# Patient Record
Sex: MALE | Race: WHITE | URBAN - METROPOLITAN AREA
[De-identification: names, ages, dates, MRNs, and addresses within clinical notes are randomized per-mention and may not be internally consistent; named-entity substitution may affect disease eponyms.]

---

## 2017-11-07 PROBLEM — M16.11 OSTEOARTHRITIS OF ONE HIP, RIGHT: Chronic | Status: ACTIVE | Noted: 2017-11-07

## 2018-06-08 ENCOUNTER — OFFICE VISIT (OUTPATIENT)
Dept: CARDIOLOGY CLINIC | Age: 83
End: 2018-06-08
Payer: MEDICARE

## 2018-06-08 VITALS
HEART RATE: 64 BPM | SYSTOLIC BLOOD PRESSURE: 114 MMHG | HEIGHT: 64 IN | DIASTOLIC BLOOD PRESSURE: 64 MMHG | BODY MASS INDEX: 26.67 KG/M2 | RESPIRATION RATE: 12 BRPM | WEIGHT: 156.2 LBS

## 2018-06-08 DIAGNOSIS — E66.3 OVER WEIGHT: ICD-10-CM

## 2018-06-08 DIAGNOSIS — I25.10 CORONARY ARTERY DISEASE INVOLVING NATIVE CORONARY ARTERY OF NATIVE HEART WITHOUT ANGINA PECTORIS: Primary | ICD-10-CM

## 2018-06-08 DIAGNOSIS — I38 VALVULAR HEART DISEASE: ICD-10-CM

## 2018-06-08 DIAGNOSIS — E78.5 DYSLIPIDEMIA: ICD-10-CM

## 2018-06-08 DIAGNOSIS — I27.20 PULMONARY HYPERTENSION (HCC): ICD-10-CM

## 2018-06-08 PROCEDURE — G8599 NO ASA/ANTIPLAT THER USE RNG: HCPCS | Performed by: INTERNAL MEDICINE

## 2018-06-08 PROCEDURE — G8427 DOCREV CUR MEDS BY ELIG CLIN: HCPCS | Performed by: INTERNAL MEDICINE

## 2018-06-08 PROCEDURE — 93000 ELECTROCARDIOGRAM COMPLETE: CPT | Performed by: INTERNAL MEDICINE

## 2018-06-08 PROCEDURE — G8419 CALC BMI OUT NRM PARAM NOF/U: HCPCS | Performed by: INTERNAL MEDICINE

## 2018-06-08 PROCEDURE — 99214 OFFICE O/P EST MOD 30 MIN: CPT | Performed by: INTERNAL MEDICINE

## 2018-06-08 PROCEDURE — 1036F TOBACCO NON-USER: CPT | Performed by: INTERNAL MEDICINE

## 2018-06-08 PROCEDURE — 1123F ACP DISCUSS/DSCN MKR DOCD: CPT | Performed by: INTERNAL MEDICINE

## 2018-06-08 PROCEDURE — 1101F PT FALLS ASSESS-DOCD LE1/YR: CPT | Performed by: INTERNAL MEDICINE

## 2018-06-08 PROCEDURE — 4040F PNEUMOC VAC/ADMIN/RCVD: CPT | Performed by: INTERNAL MEDICINE

## 2018-06-08 NOTE — PROGRESS NOTES
medications. Compliance with medications and f/u with all physicians discussed. Risk factor modification based on risk profile discussed. Call if any exertional chest pain, short of breath, dizzy or palpitations   Follow up in 12  months or earlier if needed.          [facility] Card[facility]  [address] [facility], [address] [place] [address]  [phone]

## 2019-10-02 ENCOUNTER — INITIAL CONSULT (OUTPATIENT)
Dept: CARDIOLOGY CLINIC | Age: 84
End: 2019-10-02
Payer: MEDICARE

## 2019-10-02 VITALS
WEIGHT: 145.8 LBS | HEART RATE: 67 BPM | RESPIRATION RATE: 18 BRPM | HEIGHT: 64 IN | SYSTOLIC BLOOD PRESSURE: 130 MMHG | DIASTOLIC BLOOD PRESSURE: 70 MMHG | BODY MASS INDEX: 24.89 KG/M2

## 2019-10-02 DIAGNOSIS — I38 VHD (VALVULAR HEART DISEASE): ICD-10-CM

## 2019-10-02 DIAGNOSIS — I25.119 CORONARY ARTERY DISEASE WITH ANGINA PECTORIS, UNSPECIFIED VESSEL OR LESION TYPE, UNSPECIFIED WHETHER NATIVE OR TRANSPLANTED HEART (HCC): Primary | ICD-10-CM

## 2019-10-02 PROCEDURE — G8484 FLU IMMUNIZE NO ADMIN: HCPCS | Performed by: NURSE PRACTITIONER

## 2019-10-02 PROCEDURE — 93000 ELECTROCARDIOGRAM COMPLETE: CPT | Performed by: INTERNAL MEDICINE

## 2019-10-02 PROCEDURE — 1123F ACP DISCUSS/DSCN MKR DOCD: CPT | Performed by: NURSE PRACTITIONER

## 2019-10-02 PROCEDURE — G8419 CALC BMI OUT NRM PARAM NOF/U: HCPCS | Performed by: NURSE PRACTITIONER

## 2019-10-02 PROCEDURE — G8427 DOCREV CUR MEDS BY ELIG CLIN: HCPCS | Performed by: NURSE PRACTITIONER

## 2019-10-02 PROCEDURE — G8599 NO ASA/ANTIPLAT THER USE RNG: HCPCS | Performed by: NURSE PRACTITIONER

## 2019-10-02 PROCEDURE — 99213 OFFICE O/P EST LOW 20 MIN: CPT | Performed by: NURSE PRACTITIONER

## 2019-10-02 PROCEDURE — 1036F TOBACCO NON-USER: CPT | Performed by: NURSE PRACTITIONER

## 2019-10-02 PROCEDURE — 4040F PNEUMOC VAC/ADMIN/RCVD: CPT | Performed by: NURSE PRACTITIONER

## 2021-05-28 ENCOUNTER — TELEPHONE (OUTPATIENT)
Dept: PHARMACY | Facility: CLINIC | Age: 86
End: 2021-05-28

## 2021-05-28 NOTE — TELEPHONE ENCOUNTER
[facility] HEALTH CLINICAL PHARMACY REVIEW: ADHERENCE REVIEW  Identified care gap per [place]; fills at [facility]: ACE/ARB and Statin adherence    Last Visit: n/a    ASSESSMENT  ACE/ARB ADHERENCE    Per Insurance Records through [facility]:  LOSARTAN POT TAB 25MG last filled on 5/11/21 for 60 day supply. Next refill due: 7/10/21    Per [facility]:   LOSARTAN POT TAB 25MG last picked up on 5/11/21 for 60 day supply. 1 refills remaining. Billed through [employer]     BP Readings from Last 3 Encounters:   10/02/19 130/70   06/08/18 114/64   11/09/17 106/62     CrCl cannot be calculated (Patient's most recent lab result is older than the maximum 120 days allowed. ). [facility]    Per Insurance Records through [facility]:   Fail Date: 6/14/21  ROSUVASTATIN TAB 20MG last filled on 1/3/21 for 90 day supply. Next refill due: 4/3/21      Per [facility]:   ROSUVASTATIN TAB 20MG last picked up on 1/3/21 for 90 day supply. 2 refills remaining. Billed through [email or web address]. Awaiting patient pickup. No results found for: CHOL, TRIG, HDL, LDLCHOLESTEROL, LDLCALC, LDLDIRECT  No results found for: ALT, AST  The ASCVD Risk score ([address] et al., 2013) failed to calculate for the following reasons: The 2013 ASCVD risk score is only valid for ages 36 to 78     PLAN  The following are interventions that have been identified:   - Patient overdue refilling Rosuvastatin and active on home medication list.   - Patient needs refills for Losartan    Reached patient to review. Patient stated he was out of Rosuvastatin and stated he would pick it up tomorrow at pharmacy. Patient seemed a little confused at times on what call was about. Advised that using a pill box or alarm could help him stay adherent with medication. No future appointments.     [place] Holbrook[address]   Direct: [phone]  Department, toll free: [phone], option 7

## 2021-05-28 NOTE — TELEPHONE ENCOUNTER
For Pharmacy [address] in place:  No   Recommendation Provided To: Patient/Caregiver: 1 via Telephone   Intervention Detail: Adherence Monitorin   Gap Closed?: Yes    Total # of Interventions Recommended: 1   Total # of Interventions Accepted: 1   Intervention Accepted By: Patient/Caregiver: 1   Time Spent (min): 15

## 2021-09-07 ENCOUNTER — TELEPHONE (OUTPATIENT)
Dept: PHARMACY | Facility: CLINIC | Age: 86
End: 2021-09-07

## 2021-09-07 NOTE — LETTER
[place]  [address], [address]        [address] [facility] [address] [facility] [address]           09/09/21     Dear Radha Henderson,    We tried to reach you recently regarding your blood pressure and statin medications, but were unable to reach you on the telephone. We have on file that you are currently taking losartan and rosuvastatin. If you are no longer taking or taking differently, please call us at the number below so that we can discuss this and update your medication profile. It appears that this medication has not been filled at proper times. We are worried you might be missing doses or not taking it as directed. It is important that you take your medications regularly and try not to miss a single dose. Some ways to help you remember to take and refill your medications are to:  · Use a pill box, set an alarm, and/or keep your medication near something that you do every day  · Ask your pharmacy if they participate in [facility]", a program where you can  all of your medications on the same day  · Ask your pharmacy if you can be set up with automatic refill, where they will automatically refill your prescription when it is due and let you know it's ready to     Sincerely,   Bebeto Allison, Pharm. [address] [facility], toll free: [phone], option 1

## 2021-09-07 NOTE — TELEPHONE ENCOUNTER
POPULATION HEALTH CLINICAL PHARMACY REVIEW: ADHERENCE REVIEW  Identified care gap per [place]; fills at [facility]: ACE/ARB and Statin adherence    Last Visit: unknown; [facility] PCP    ASSESSMENT  ACE/ARB ADHERENCE    Per Insurance Records through 8/23/2021 ([facility] = 77%; Potential Fail Date: 9/12/2021):   Losartan 25mg last filled on 5/11/2021 for 60 day supply. Next refill due: 7/10/2021    Per [facility]:   Losartan 25mg last picked up on 8/24/2021 for 90 day supply. 0 refills remaining. Billed through [employer]     BP Readings from Last 3 Encounters:   10/02/19 130/70   06/08/18 114/64   11/09/17 106/62     CrCl cannot be calculated (Patient's most recent lab result is older than the maximum 120 days allowed. ). STATIN ADHERENCE    Per Insurance Records through 8/23/2021 (YTLESTER Baker = 76%; Potential Fail Date: 9/12/2021):   Rosuvastatin 20mg last filled on 5/28/2021 for 90 day supply. Next refill due: 8/26/2021    Per [facility]:   Rosuvastatin 20mg last picked up on 5/28/2021 for 90 day supply. 1 refills remaining. Billed through [employer]     No results found for: CHOL, TRIG, HDL, LDLCHOLESTEROL, LDLCALC, LDLDIRECT  No results found for: ALT, AST  The ASCVD Risk score (Jaymie Later., et al., 2013) failed to calculate for the following reasons: The 2013 ASCVD risk score is only valid for ages 36 to 78     PLAN  The following are interventions that have been identified:   - Patient overdue refilling rosuvastatin and losartan and active on home medication list.      Will leave message with office of Elin Cabezas CNP who prescribes losartan for refill. Per Ana PCP is [address]. Attempting to reach patient to review.  Left message asking for return call.     [address] // Department, toll free [phone], Option 1

## 2021-09-09 NOTE — TELEPHONE ENCOUNTER
POPULATION HEALTH CLINICAL PHARMACY REVIEW: ADHERENCE REVIEW    Second attempt to reach patient. Left message asking for return call. Will send letter. Ting Ortiz Pharm. [address] [facility], toll free: [phone], option 1     ==============================================================    For Pharmacy [id number] [place] [address] Road in place:  No   Recommendation Provided To: Provider: 1 via Called provider office   Intervention Detail: Adherence Monitorin and Refill(s) Provided   Gap Closed?: No    Intervention Accepted By: Provider: 0   Time Spent (min): 45

## 2022-06-10 ENCOUNTER — HOSPITAL ENCOUNTER (EMERGENCY)
Age: 87
Discharge: HOME | End: 2022-06-10
Attending: EMERGENCY MEDICINE
Payer: MEDICARE

## 2022-06-10 ENCOUNTER — APPOINTMENT (OUTPATIENT)
Dept: GENERAL RADIOLOGY | Age: 87
End: 2022-06-10
Payer: MEDICARE

## 2022-06-10 VITALS
RESPIRATION RATE: 21 BRPM | SYSTOLIC BLOOD PRESSURE: 122 MMHG | HEART RATE: 55 BPM | DIASTOLIC BLOOD PRESSURE: 80 MMHG | TEMPERATURE: 98 F | HEIGHT: 64 IN | WEIGHT: 145 LBS | OXYGEN SATURATION: 97 % | BODY MASS INDEX: 24.75 KG/M2

## 2022-06-10 DIAGNOSIS — I49.3 ASYMPTOMATIC PVCS: Primary | ICD-10-CM

## 2022-06-10 LAB
ALBUMIN SERPL-MCNC: 3.8 G/DL (ref 3.5–5.2)
ALP BLD-CCNC: 67 U/L (ref 40–129)
ALT SERPL-CCNC: 24 U/L (ref 0–40)
ANION GAP SERPL CALCULATED.3IONS-SCNC: 10 MMOL/L (ref 7–16)
AST SERPL-CCNC: 57 U/L (ref 0–39)
BASOPHILS ABSOLUTE: 0.05 E9/L (ref 0–0.2)
BASOPHILS RELATIVE PERCENT: 0.6 % (ref 0–2)
BILIRUB SERPL-MCNC: 0.3 MG/DL (ref 0–1.2)
BUN BLDV-MCNC: 14 MG/DL (ref 6–23)
CALCIUM SERPL-MCNC: 8.4 MG/DL (ref 8.6–10.2)
CHLORIDE BLD-SCNC: 105 MMOL/L (ref 98–107)
CO2: 23 MMOL/L (ref 22–29)
CREAT SERPL-MCNC: 0.9 MG/DL (ref 0.7–1.2)
EOSINOPHILS ABSOLUTE: 0.22 E9/L (ref 0.05–0.5)
EOSINOPHILS RELATIVE PERCENT: 2.8 % (ref 0–6)
GFR AFRICAN AMERICAN: >60
GFR NON-AFRICAN AMERICAN: >60 ML/MIN/1.73
GLUCOSE BLD-MCNC: 105 MG/DL (ref 74–99)
HCT VFR BLD CALC: 42.7 % (ref 37–54)
HEMOGLOBIN: 14.1 G/DL (ref 12.5–16.5)
IMMATURE GRANULOCYTES #: 0.01 E9/L
IMMATURE GRANULOCYTES %: 0.1 % (ref 0–5)
LYMPHOCYTES ABSOLUTE: 0.97 E9/L (ref 1.5–4)
LYMPHOCYTES RELATIVE PERCENT: 12.3 % (ref 20–42)
MAGNESIUM: 2.3 MG/DL (ref 1.6–2.6)
MCH RBC QN AUTO: 31.8 PG (ref 26–35)
MCHC RBC AUTO-ENTMCNC: 33 % (ref 32–34.5)
MCV RBC AUTO: 96.2 FL (ref 80–99.9)
MONOCYTES ABSOLUTE: 0.62 E9/L (ref 0.1–0.95)
MONOCYTES RELATIVE PERCENT: 7.9 % (ref 2–12)
NEUTROPHILS ABSOLUTE: 6 E9/L (ref 1.8–7.3)
NEUTROPHILS RELATIVE PERCENT: 76.3 % (ref 43–80)
PDW BLD-RTO: 14.2 FL (ref 11.5–15)
PLATELET # BLD: 182 E9/L (ref 130–450)
PMV BLD AUTO: 9.7 FL (ref 7–12)
POTASSIUM SERPL-SCNC: 5.3 MMOL/L (ref 3.5–5)
RBC # BLD: 4.44 E12/L (ref 3.8–5.8)
SODIUM BLD-SCNC: 138 MMOL/L (ref 132–146)
TOTAL PROTEIN: 6.1 G/DL (ref 6.4–8.3)
TROPONIN, HIGH SENSITIVITY: 14 NG/L (ref 0–11)
TROPONIN, HIGH SENSITIVITY: 14 NG/L (ref 0–11)
WBC # BLD: 7.9 E9/L (ref 4.5–11.5)

## 2022-06-10 PROCEDURE — 85025 COMPLETE CBC W/AUTO DIFF WBC: CPT

## 2022-06-10 PROCEDURE — 99285 EMERGENCY DEPT VISIT HI MDM: CPT

## 2022-06-10 PROCEDURE — 83735 ASSAY OF MAGNESIUM: CPT

## 2022-06-10 PROCEDURE — 80053 COMPREHEN METABOLIC PANEL: CPT

## 2022-06-10 PROCEDURE — 71045 X-RAY EXAM CHEST 1 VIEW: CPT

## 2022-06-10 PROCEDURE — 84484 ASSAY OF TROPONIN QUANT: CPT

## 2022-06-10 PROCEDURE — 93005 ELECTROCARDIOGRAM TRACING: CPT

## 2022-06-10 ASSESSMENT — ENCOUNTER SYMPTOMS
ABDOMINAL PAIN: 0
CHEST TIGHTNESS: 0
DIARRHEA: 0
CHOKING: 0
BLOOD IN STOOL: 0
SHORTNESS OF BREATH: 0
VOMITING: 0
COLOR CHANGE: 0
SORE THROAT: 0
CONSTIPATION: 0
NAUSEA: 0
COUGH: 0

## 2022-06-10 ASSESSMENT — PAIN - FUNCTIONAL ASSESSMENT: PAIN_FUNCTIONAL_ASSESSMENT: NONE - DENIES PAIN

## 2022-06-10 NOTE — ED PROVIDER NOTES
[facility] ENCOUNTER      Pt Name: Paco Carnes  MRN: [id number]  [place] 3/20/1930  Date of evaluation: 6/10/2022      CHIEF COMPLAINT       Chief Complaint   Patient presents with    Irregular Heart Beat     sent in by Dr. Silvia Santillan for frequent PVC's which is new for patient. Patient has no complaints        HPI  Paco Carnes is a 80 y.o. male  with PMHx of HTN (metoprolol, losartan), HLD presents with weakness for the past 2 days. Patient went to urgent care, they sent patient here for frequent PVCs. Spoke to nephews (power of ) who states patient is at his baseline. Describes symptoms moderate in severity with no alleviating or exacerbating factors. Denies any fever, chills, n/v, headache, dizziness, vision changes, neck tenderness or stiffness,  cp, palpitations, leg swelling/tenderness, sob, cough, abd pain, dysuria, hematuria, diarrhea, constipation. Except as noted above the remainder of the review of systems was reviewed and negative. Review of Systems   Constitutional: Negative for appetite change, chills, fatigue and fever. HENT: Negative for congestion and sore throat. Eyes: Negative for visual disturbance. Respiratory: Negative for cough, choking, chest tightness and shortness of breath. Cardiovascular: Negative for chest pain, palpitations and leg swelling. Gastrointestinal: Negative for abdominal pain, blood in stool, constipation, diarrhea, nausea and vomiting. Endocrine: Negative for polyphagia. Genitourinary: Negative for decreased urine volume, difficulty urinating, flank pain and hematuria. Musculoskeletal: Negative for arthralgias, gait problem, joint swelling and myalgias. Skin: Negative for color change, pallor, rash and wound. Neurological: Positive for weakness. Negative for dizziness, tremors, seizures, syncope, light-headedness, numbness and headaches. Hematological: Negative for adenopathy. Does not bruise/bleed easily. Psychiatric/Behavioral: Negative for confusion and hallucinations. All other systems reviewed and are negative. Physical Exam  Vitals reviewed. Constitutional:       General: He is not in acute distress. Appearance: Normal appearance. He is normal weight. He is not ill-appearing, toxic-appearing or diaphoretic. HENT:      Head: Normocephalic and atraumatic. Right Ear: External ear normal.      Left Ear: External ear normal.      Nose: Nose normal. No congestion or rhinorrhea. Mouth/Throat:      Mouth: Mucous membranes are moist.      Pharynx: Oropharynx is clear. No oropharyngeal exudate or posterior oropharyngeal erythema. Eyes:      Extraocular Movements: Extraocular movements intact. Conjunctiva/sclera: Conjunctivae normal.      Pupils: Pupils are equal, round, and reactive to light. Cardiovascular:      Rate and Rhythm: Normal rate and regular rhythm. Pulses: Normal pulses. Pulmonary:      Effort: Pulmonary effort is normal. No respiratory distress. Breath sounds: Normal breath sounds. No wheezing or rhonchi. Chest:      Chest wall: No tenderness. Abdominal:      General: Abdomen is flat. Bowel sounds are normal. There is no distension. Palpations: Abdomen is soft. Tenderness: There is no abdominal tenderness. There is no right CVA tenderness, left CVA tenderness or guarding. Hernia: No hernia is present. Musculoskeletal:      Cervical back: Normal range of motion. Right lower leg: No edema. Left lower leg: No edema. Skin:     General: Skin is warm and dry. Capillary Refill: Capillary refill takes less than 2 seconds. Neurological:      General: No focal deficit present. Mental Status: He is alert and oriented to person, place, and time. Mental status is at baseline.    Psychiatric:         Mood and Affect: Mood normal.         Behavior: Behavior normal.         Thought Content: Thought content normal.         Judgment: Judgment normal.          Procedures     MDM        80 y.o. male  with PMHx of HTN (metoprolol, losartan), HLD presents with weakness for the past 2 days. Patient went to urgent care, they sent patient here for frequent PVCs. Spoke to nephews (power of ) who states patient is at his baseline. While in the ED patient was hemodynamically stable, afebrile, nontoxic-appearing, in no respiratory distress. Physical exam unremarkable. Labs remarkable for elevated troponin with delta troponin of 0, normal electrolytes, low H&H with normal magnesium. EKG normal sinus with occasional PVCs, rBBB with  no sign of acute ischemia. CXR negative for any acute pathologies. Patient remained asymptomatic throughout his stay. Patient feels comfortable going home and will follow up outpatient with PCP. Patient understands to return to the ED in case of worsening symptoms. ED Course as of 06/11/22 0212   Sat Jun 11, 2022   0209 EKG: This EKG is signed by emergency department physician. Rate: 61  Rhythm: Sinus, few PVCs, and with Right BBB  Interpretation: non-specific EKG  Comparison: stable as compared to patient's most recent EKG      [TC]      ED Course User Adrien Yee MD       --------------------------------------------- PAST HISTORY ---------------------------------------------  Past Medical History:  has a past medical history of Asthma, BPH (benign prostatic hypertrophy), CAD (coronary artery disease), COPD (chronic obstructive pulmonary disease) ([facility] [address] Glaucoma, Hyperlipidemia, Hypertension, Preoperative clearance, Retention of urine, and Valvular heart disease. Past Surgical History:  has a past surgical history that includes tumor excision (4 YRS AGO); Colonoscopy (11/7/2011); skin biopsy; Tonsillectomy; Diagnostic Cardiac Cath Lab Procedure (10/02/02); Coronary angioplasty (10/02/02);  Cataract removal (11/10 & 1/11); eye surgery; and Hip Arthroplasty (Right, 11/07/2017). Social History:  reports that he has quit smoking. His smoking use included cigarettes. He has a 30.00 pack-year smoking history. He has never used smokeless tobacco. He reports current alcohol use. He reports that he does not use drugs. Family History: family history includes Cancer in his brother and sister; Hypertension in his sister. The patients home medications have been reviewed. Allergies: Patient has no known allergies.     -------------------------------------------------- RESULTS -------------------------------------------------  Labs:  Results for orders placed or performed during the hospital encounter of 06/10/22   CBC with Auto Differential   Result Value Ref Range    WBC 7.9 4.5 - 11.5 E9/L    RBC 4.44 3.80 - 5.80 E12/L    Hemoglobin 14.1 12.5 - 16.5 g/dL    Hematocrit 42.7 37.0 - 54.0 %    MCV 96.2 80.0 - 99.9 fL    MCH 31.8 26.0 - 35.0 pg    MCHC 33.0 32.0 - 34.5 %    RDW 14.2 11.5 - 15.0 fL    Platelets 243 306 - 751 E9/L    MPV 9.7 7.0 - 12.0 fL    Neutrophils % 76.3 43.0 - 80.0 %    Immature Granulocytes % 0.1 0.0 - 5.0 %    Lymphocytes % 12.3 (L) 20.0 - 42.0 %    Monocytes % 7.9 2.0 - 12.0 %    Eosinophils % 2.8 0.0 - 6.0 %    Basophils % 0.6 0.0 - 2.0 %    Neutrophils Absolute 6.00 1.80 - 7.30 E9/L    Immature Granulocytes # 0.01 E9/L    Lymphocytes Absolute 0.97 (L) 1.50 - 4.00 E9/L    Monocytes Absolute 0.62 0.10 - 0.95 E9/L    Eosinophils Absolute 0.22 0.05 - 0.50 E9/L    Basophils Absolute 0.05 0.00 - 0.20 E9/L   Troponin   Result Value Ref Range    Troponin, High Sensitivity 14 (H) 0 - 11 ng/L   Comprehensive Metabolic Panel   Result Value Ref Range    Sodium 138 132 - 146 mmol/L    Chloride 105 98 - 107 mmol/L    CO2 23 22 - 29 mmol/L    Anion Gap 10 7 - 16 mmol/L    Glucose 105 (H) 74 - 99 mg/dL    BUN 14 6 - 23 mg/dL    CREATININE 0.9 0.7 - 1.2 mg/dL    GFR Non-African American >60 >=60 mL/min/1.73    GFR African American >60     Calcium 8.4 (L) 8.6 - 10.2 mg/dL    Total Protein 6.1 (L) 6.4 - 8.3 g/dL    Albumin 3.8 3.5 - 5.2 g/dL    Total Bilirubin 0.3 0.0 - 1.2 mg/dL    Alkaline Phosphatase 67 40 - 129 U/L    ALT 24 0 - 40 U/L    AST 57 (H) 0 - 39 U/L    Potassium 5.3 (H) 3.5 - 5.0 mmol/L   Magnesium   Result Value Ref Range    Magnesium 2.3 1.6 - 2.6 mg/dL   Troponin   Result Value Ref Range    Troponin, High Sensitivity 14 (H) 0 - 11 ng/L       Radiology:  XR CHEST PORTABLE   Final Result   No acute cardiopulmonary abnormality.             ------------------------- NURSING NOTES AND VITALS REVIEWED ---------------------------  Date / Time Roomed:  6/10/2022  4:32 PM  ED Bed Assignment:  ROBERT/ROBERT    The nursing notes within the ED encounter and vital signs as below have been reviewed. /80   Pulse 55   Temp 98 °F (36.7 °C)   Resp 21   Ht 5' 4\" (1.626 m)   Wt 145 lb (65.8 kg)   SpO2 97%   BMI 24.89 kg/m²   Oxygen Saturation Interpretation: Normal      ------------------------------------------ PROGRESS NOTES ------------------------------------------  2:11 AM EDT  I have spoken with the patient and discussed todays results, in addition to providing specific details for the plan of care and counseling regarding the diagnosis and prognosis. Their questions are answered at this time and they are agreeable with the plan. I discussed at length with them reasons for immediate return here for re evaluation. They will followup with their primary care physician by calling their office tomorrow. --------------------------------- ADDITIONAL PROVIDER NOTES ---------------------------------  At this time the patient is without objective evidence of an acute process requiring hospitalization or inpatient management. They have remained hemodynamically stable throughout their entire ED visit and are stable for discharge with outpatient follow-up.      The plan has been discussed in detail and they are aware of the specific conditions for emergent return, as well as the importance of follow-up. Discharge Medication List as of 6/10/2022  6:24 PM          Diagnosis:  1. Asymptomatic PVCs        Disposition:  Patient's disposition: Discharge to home  Patient's condition is stable.        Luisa Varela MD  Resident  06/11/22 0227

## 2022-06-12 LAB
EKG ATRIAL RATE: 61 BPM
EKG P AXIS: 51 DEGREES
EKG P-R INTERVAL: 176 MS
EKG Q-T INTERVAL: 472 MS
EKG QRS DURATION: 144 MS
EKG QTC CALCULATION (BAZETT): 475 MS
EKG R AXIS: 20 DEGREES
EKG T AXIS: -16 DEGREES
EKG VENTRICULAR RATE: 61 BPM

## 2022-11-29 ENCOUNTER — TELEPHONE (OUTPATIENT)
Dept: ADMINISTRATIVE | Age: 87
End: 2022-11-29

## 2022-11-29 NOTE — TELEPHONE ENCOUNTER
Celina with Dr. Evelina Weston' office calling to schedule URGENT NP - prior Dr. Doyle Fair pt - Oct 2019. Office notified. Theodore Guajardo[facility]. Office added on tomorrow with [address].

## 2022-11-30 ENCOUNTER — OFFICE VISIT (OUTPATIENT)
Dept: CARDIOLOGY CLINIC | Age: 87
End: 2022-11-30
Payer: MEDICARE

## 2022-11-30 VITALS
HEART RATE: 76 BPM | RESPIRATION RATE: 18 BRPM | HEIGHT: 64 IN | DIASTOLIC BLOOD PRESSURE: 84 MMHG | SYSTOLIC BLOOD PRESSURE: 136 MMHG | WEIGHT: 141.4 LBS | BODY MASS INDEX: 24.14 KG/M2 | OXYGEN SATURATION: 94 %

## 2022-11-30 DIAGNOSIS — I38 VALVULAR HEART DISEASE: Primary | ICD-10-CM

## 2022-11-30 DIAGNOSIS — I25.10 CORONARY ARTERY DISEASE WITHOUT ANGINA PECTORIS, UNSPECIFIED VESSEL OR LESION TYPE, UNSPECIFIED WHETHER NATIVE OR TRANSPLANTED HEART: ICD-10-CM

## 2022-11-30 PROBLEM — M16.11 OSTEOARTHRITIS OF ONE HIP, RIGHT: Chronic | Status: RESOLVED | Noted: 2017-11-07 | Resolved: 2022-11-30

## 2022-11-30 PROCEDURE — G8484 FLU IMMUNIZE NO ADMIN: HCPCS | Performed by: INTERNAL MEDICINE

## 2022-11-30 PROCEDURE — 99205 OFFICE O/P NEW HI 60 MIN: CPT | Performed by: INTERNAL MEDICINE

## 2022-11-30 PROCEDURE — G8427 DOCREV CUR MEDS BY ELIG CLIN: HCPCS | Performed by: INTERNAL MEDICINE

## 2022-11-30 PROCEDURE — 1123F ACP DISCUSS/DSCN MKR DOCD: CPT | Performed by: INTERNAL MEDICINE

## 2022-11-30 PROCEDURE — 1036F TOBACCO NON-USER: CPT | Performed by: INTERNAL MEDICINE

## 2022-11-30 PROCEDURE — G8420 CALC BMI NORM PARAMETERS: HCPCS | Performed by: INTERNAL MEDICINE

## 2022-11-30 PROCEDURE — 93000 ELECTROCARDIOGRAM COMPLETE: CPT | Performed by: INTERNAL MEDICINE

## 2022-11-30 NOTE — PROGRESS NOTES
Chief Complaint   Patient presents with    Coronary Artery Disease    Valvular Heart Disease       Patient Active Problem List    Diagnosis Date Noted    COPD (chronic obstructive pulmonary disease) ([facility] [address]     Benign prostatic hyperplasia      Overview Note:     Updating Deprecated Diagnoses      Hyperlipidemia     Hypertension     Glaucoma     Valvular heart disease     CAD (coronary artery disease) 01/09/2013     Overview Note:     A. \"s/p 2 stents\":  2.75 x12, 2.75 x 8 [facility] -  OM2 on 10/2/2002 - Dr Ruth Marquez;         Current Outpatient Medications   Medication Sig Dispense Refill    acetaminophen (TYLENOL) 325 MG tablet Take 2 tablets by mouth every 6 hours as needed for Pain 120 tablet 0    aspirin 81 MG EC tablet Take 1 tablet by mouth 2 times daily DVT prophylaxis following hip replacement x 30 days (Patient taking differently: Take 81 mg by mouth daily DVT prophylaxis following hip replacement x 30 days) 60 tablet 0    Multiple Vitamins-Minerals (OCUVITE) TABS oral tablet Take 1 tablet by mouth daily      tamsulosin (FLOMAX) 0.4 MG capsule Take 0.4 mg by mouth daily. Multiple Vitamins-Minerals (CENTRUM SILVER ADULT 50+) TABS Take 1 tablet by mouth daily. metoprolol (TOPROL-XL) 25 MG XL tablet Take 12.5 mg by mouth nightly      finasteride (PROSCAR) 5 MG tablet Take 5 mg by mouth daily.          Current Facility-Administered Medications   Medication Dose Route Frequency Provider Last Rate Last Admin    perflutren lipid microspheres (DEFINITY) injection 1.5 mL  1.5 mL IntraVENous ONCE PRN Charlie Mcdonnell MD        regadenoson [facility]) injection 0.4 mg  0.4 mg IntraVENous ONCE PRN Charlie Mcdonnell MD            No Known Allergies    Vitals:    11/30/22 1206   BP: 136/84   Site: Left Upper Arm   Position: Sitting   Cuff Size: Medium Adult   Pulse: 76   Resp: 18   SpO2: 94%   Weight: 141 lb 6.4 oz (64.1 kg)   Height: 5' 4\" (1.626 m)                 SUBJECTIVE: Gabriel Cao presents to the office today for consult - dr Jalen Rubalcava. DR Bekah Calloway PATIENT - last seen 2018 - I reviewed records. 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 Son reports elevated BNP on recent PCP labs, and a clear CXR         Physical Exam   /84 (Site: Left Upper Arm, Position: Sitting, Cuff Size: Medium Adult)   Pulse 76   Resp 18   Ht 5' 4\" (1.626 m)   Wt 141 lb 6.4 oz (64.1 kg)   SpO2 94%   BMI 24.27 kg/m²   Constitutional: Oriented to person, place, and time. Well-developed and well-nourished. No distress. Head: Normocephalic and atraumatic. Neck: No JVD present. Carotid bruit is not present. Cardiovascular: Normal rate, regular rhythm, normal heart sounds and intact distal pulses. No gallop and no friction rub. Grade II/VI mid peaking ASHOK RUSB - No AI or MR murmur heard. S2 well preserved  Pulmonary/Chest: Effort normal and breath sounds normal.   Abdominal: Soft. Bowel sounds are normal. No distension and no mass. Musculoskeletal: No edema   Neurological: Alert and oriented to person, place, and time. Skin: Skin is warm and dry. No rash noted. Psychiatric: Normal mood and affect. Behavior is normal.     EKG:  normal sinus rhythm, frequent PVC's noted, unchanged from previous tracings.     ASSESSMENT AND PLAN:  Patient Active Problem List   Diagnosis    CAD (coronary artery disease)    COPD (chronic obstructive pulmonary disease) (HCC)    Benign prostatic hyperplasia    Hyperlipidemia    Hypertension    Glaucoma    Valvular heart disease     Elderly frail gent with hx of CAD and valvular heart disease  Now with DONALD with minimal exertion  CV exam today without evidence of volume overload, and AS does not sound severe  EKG with ectopy but no new ischemic changes  Reminded pateint and son that he should take his statin - was on rosuvastatin 20 mg  Echo and Janiya Daily M.D  [facility] Cardiology

## 2022-12-05 ENCOUNTER — TELEPHONE (OUTPATIENT)
Dept: CARDIOLOGY | Age: 87
End: 2022-12-05

## 2022-12-05 NOTE — TELEPHONE ENCOUNTER
Spoke with patients nathen Freed and confirmed pharmacological  stress test appointment on 12/7/2022 at [address] [place] [address]. Instructions for test,given to nephew, and COVID-19 preprocedure information reviewed with patient, questions answered. Patient verbalized understanding.

## 2022-12-07 ENCOUNTER — HOSPITAL ENCOUNTER (OUTPATIENT)
Dept: CARDIOLOGY | Age: 87
Discharge: HOME | End: 2022-12-07
Payer: MEDICARE

## 2022-12-07 ENCOUNTER — TELEPHONE (OUTPATIENT)
Dept: CARDIOLOGY CLINIC | Age: 87
End: 2022-12-07

## 2022-12-07 VITALS
HEART RATE: 74 BPM | WEIGHT: 141 LBS | BODY MASS INDEX: 24.07 KG/M2 | SYSTOLIC BLOOD PRESSURE: 141 MMHG | HEIGHT: 64 IN | DIASTOLIC BLOOD PRESSURE: 88 MMHG | OXYGEN SATURATION: 99 %

## 2022-12-07 DIAGNOSIS — I25.10 CORONARY ARTERY DISEASE WITHOUT ANGINA PECTORIS, UNSPECIFIED VESSEL OR LESION TYPE, UNSPECIFIED WHETHER NATIVE OR TRANSPLANTED HEART: ICD-10-CM

## 2022-12-07 DIAGNOSIS — I38 VALVULAR HEART DISEASE: ICD-10-CM

## 2022-12-07 PROCEDURE — A9502 TC99M TETROFOSMIN: HCPCS | Performed by: INTERNAL MEDICINE

## 2022-12-07 PROCEDURE — 93017 CV STRESS TEST TRACING ONLY: CPT

## 2022-12-07 PROCEDURE — 3430000000 HC RX DIAGNOSTIC RADIOPHARMACEUTICAL: Performed by: INTERNAL MEDICINE

## 2022-12-07 PROCEDURE — 78452 HT MUSCLE IMAGE SPECT MULT: CPT

## 2022-12-07 PROCEDURE — 2580000003 HC RX 258: Performed by: INTERNAL MEDICINE

## 2022-12-07 PROCEDURE — 93306 TTE W/DOPPLER COMPLETE: CPT

## 2022-12-07 PROCEDURE — 6360000002 HC RX W HCPCS: Performed by: INTERNAL MEDICINE

## 2022-12-07 RX ORDER — SODIUM CHLORIDE 0.9 % (FLUSH) 0.9 %
10 SYRINGE (ML) INJECTION PRN
Status: DISCONTINUED | OUTPATIENT
Start: 2022-12-07 | End: 2022-12-08 | Stop reason: HOSPADM

## 2022-12-07 RX ADMIN — TETROFOSMIN 28.6 MILLICURIE: 0.23 INJECTION, POWDER, LYOPHILIZED, FOR SOLUTION INTRAVENOUS at 10:12

## 2022-12-07 RX ADMIN — SODIUM CHLORIDE, PRESERVATIVE FREE 10 ML: 5 INJECTION INTRAVENOUS at 09:08

## 2022-12-07 RX ADMIN — REGADENOSON 0.4 MG: 0.08 INJECTION, SOLUTION INTRAVENOUS at 10:11

## 2022-12-07 RX ADMIN — TETROFOSMIN 8.4 MILLICURIE: 0.23 INJECTION, POWDER, LYOPHILIZED, FOR SOLUTION INTRAVENOUS at 09:08

## 2022-12-07 RX ADMIN — SODIUM CHLORIDE, PRESERVATIVE FREE 10 ML: 5 INJECTION INTRAVENOUS at 10:12

## 2022-12-07 RX ADMIN — SODIUM CHLORIDE, PRESERVATIVE FREE 10 ML: 5 INJECTION INTRAVENOUS at 10:11

## 2022-12-07 NOTE — PROCEDURES
[id number] - [address]. [facility], [address] [place] [address]  [phone]               Pharmacologic Stress Nuclear Gated SPECT Study    Name: Spencer Dhillon Account Number: [de-identified]    :  3/20/1930          Sex: male         Date of Study:  2022    Height: 5' 4\" (162.6 cm)         Weight: 141 lb (64 kg)     Ordering Provider: Ruben Hanks MD          PCP: Gerardo Dallas MD      Cardiologist: Uli Hernandez MD             Interpreting Physician: Anton Lopez MD  _________________________________________________________________________________    Indication:   Evaluation of extent and severity of coronary artery disease    Clinical History:   Patient has prior history of coronary artery disease. Resting ECG:    HR 74 bpm  Sinus rhythm with a right bundle branch block    Procedure:   Pharmacologic stress testing was performed with regadenoson 0.4 mg for 15 seconds. The heart rate was 74 at baseline and christi to 77 beats during the infusion. The blood pressure at baseline was 141/88 and blood pressure at the end of infusion was 123/67. Blood pressure response was normal during the stress procedure. The patient tolerated the infusion well. Denied any chest pain     ECG during the infusion did not change. IMAGING: Myocardial perfusion imaging was performed at rest 30-35 minutes following the intravenous injection of 8.4 mCi of (Tc-tetrofosmin) followed by 10 ml of Normal Saline. As per infusion protocol, the patient was injected intravenously with 28.6 mCi of (Tc-tetrofosmin) followed by 10 ml of Normal Saline. Gated post-stress tomographic imaging was performed 45 minutes after stress. FINDINGS: The overall quality of the study was good. Left ventricular cavity size was noted to be enlarged on both rest and stress studies. Rotational analog analysis demonstrated abnormal patient motion.     The gated SPECT stress imaging in the short, vertical long, and horizontal long axis demonstrated normal homogeneous tracer distribution throughout the myocardium both on the post regadenoson and resting images. Gated SPECT left ventricular ejection fraction was calculated to be 36%, with global hypokinesis in the absence of regional wall motion abnormalities. Impression:    Electrocardiographically normal regadenoson infusion with a clinically nonischemic response. Myocardial perfusion imaging was normal.    Overall left ventricular systolic function was  mild to moderately impaired with global hypokinesis and no regional wall motion abnormalities with a dilated left ventricular chamber . 4. Intermediate risk pharmacologic stress test    Thank you for sending your patient to this [employer].      Electronically signed by Maris Reyes MD on 12/7/22 at 12:33 PM EST

## 2022-12-07 NOTE — DISCHARGE INSTRUCTIONS
[address]      Instructions to Patients    The following are the instructions for patients who have had a procedure in our office today. Patient name: Cam Paniagua    Radionuclide Activity: 40mCi of 99mTc-Tetrofosmin (Myoview)    Date Administered: 12/7/2022    Expires: 48 hours after scheduled appointment time      Patient may resume normal activity unless otherwise instructed. Patient may resume medications as normal.  If the need should arise, patient may call [phone] between the hours of 8:00am-4:30pm.  After hours there is at least one physician on-call at all times for those patients needing assistance. Patients may call [phone] and the answering service will direct the patient to one of our physicians for assistance. After the patient's test if they are going to be leaving from an airport in the near future they should take this letter with them to verify the test and radionuclide used for their test.      This letter verifies that the above named bearer received an injection of a radionuclide for medical purpose/usage only.         Electronically signed by Ivan Harris on 12/7/2022 at 10:10 AM

## 2022-12-07 NOTE — TELEPHONE ENCOUNTER
----- Message from Kate Mera MD sent at 12/7/2022 12:37 PM EST -----  Normal stress  Awaiting echo      ----- Message -----  From: Raj Isidro MD  Sent: 12/7/2022  12:33 PM EST  To: Kate Mera MD

## 2022-12-08 ENCOUNTER — TELEPHONE (OUTPATIENT)
Dept: CARDIOLOGY CLINIC | Age: 87
End: 2022-12-08

## 2022-12-08 DIAGNOSIS — I35.0 NONRHEUMATIC AORTIC VALVE STENOSIS: Primary | ICD-10-CM

## 2022-12-08 NOTE — TELEPHONE ENCOUNTER
----- Message from Christina Valencia MD sent at 12/8/2022  6:49 AM EST -----  Let him know echo does show severe narrowing of a valve which could make him sob  Refer to [facility]ve [facility]      ----- Message -----  From: Darion Randolph Incoming Cardiology Results From [facility]  Sent: 12/8/2022   6:45 AM EST  To: Christina Valencia MD

## 2022-12-15 ENCOUNTER — OFFICE VISIT (OUTPATIENT)
Dept: CARDIOTHORACIC SURGERY | Age: 87
End: 2022-12-15

## 2022-12-15 VITALS
BODY MASS INDEX: 24.75 KG/M2 | HEART RATE: 77 BPM | DIASTOLIC BLOOD PRESSURE: 54 MMHG | HEIGHT: 64 IN | SYSTOLIC BLOOD PRESSURE: 95 MMHG | RESPIRATION RATE: 24 BRPM | WEIGHT: 145 LBS

## 2022-12-15 DIAGNOSIS — I35.0 AORTIC VALVE STENOSIS, ETIOLOGY OF CARDIAC VALVE DISEASE UNSPECIFIED: Primary | ICD-10-CM

## 2022-12-15 NOTE — PROGRESS NOTES
[facility]  Visit Note      Patient name: Judit Callahan    Reason for consult: aortic stenosis     Referring Physician: Charito Boswell MD    Primary Care Physician: Wilson Mccauley MD    Date of service: 12/15/2022    Chief Complaint: aortic stenosis    HPI: Mr. Paco Diaz is a 80year old male with PMH of HTN, HLD, CAD s/p PCI OM2 (2002), COPD, glaucoma, BPH and valvular heart disease. Recent echocardiogram showed LVEF 45% with severe aortic stenosis (DAI 0.7, MG 45 mmHg, Vmax 4.3 m/s and DVI 0.19). also mild-moderate MR, mild AI and mild pHTN. He is symptomatic with DONALD but denies chest pain, palpitations or syncope. Allergies: No Known Allergies    Home medications:    Current Outpatient Medications   Medication Sig Dispense Refill    acetaminophen (TYLENOL) 325 MG tablet Take 2 tablets by mouth every 6 hours as needed for Pain 120 tablet 0    aspirin 81 MG EC tablet Take 1 tablet by mouth 2 times daily DVT prophylaxis following hip replacement x 30 days (Patient taking differently: Take 81 mg by mouth daily DVT prophylaxis following hip replacement x 30 days) 60 tablet 0    Multiple Vitamins-Minerals (OCUVITE) TABS oral tablet Take 1 tablet by mouth daily      tamsulosin (FLOMAX) 0.4 MG capsule Take 0.4 mg by mouth daily. Multiple Vitamins-Minerals (CENTRUM SILVER ADULT 50+) TABS Take 1 tablet by mouth daily. metoprolol (TOPROL-XL) 25 MG XL tablet Take 12.5 mg by mouth nightly      finasteride (PROSCAR) 5 MG tablet Take 5 mg by mouth daily.          Current Facility-Administered Medications   Medication Dose Route Frequency Provider Last Rate Last Admin    perflutren lipid microspheres (DEFINITY) injection 1.5 mL  1.5 mL IntraVENous ONCE PRN Mary Joe MD        regadenoson Marita Curlin) injection 0.4 mg  0.4 mg IntraVENous ONCE PRN Mary Joe MD           Past Medical History:  Past Medical History:   Diagnosis Date    Asthma     MILD, no issues    BPH (benign prostatic hypertrophy)     CAD (coronary artery disease)     follows with Dr Renuka Rosales. COPD (chronic obstructive pulmonary disease) ([facility] [address]     Glaucoma     patient unsure    Hyperlipidemia     Hypertension     Preoperative clearance 10/10/2017    cardiac, Dr Renuka Rosales. Retention of urine     Valvular heart disease     MV insufficiency, tricuspid regurgitation         Past Surgical History:  Past Surgical History:   Procedure Laterality Date    CATARACT REMOVAL  11/10 & 1/11    LEFT & RIGHT    COLONOSCOPY  11/7/2011    DIVERTICULOSIS    CORONARY ANGIOPLASTY  10/02/02    2 STENTS OF OM2 WITH GOOD RESULTS. DIAGNOSTIC CARDIAC CATH LAB PROCEDURE  10/02/02    DONE SECONDARY TO RECURRENT CP & MILD ISCHEMIA OF ANTEROLATERAL WALL ON NUCLEAR, SHOWD 30-40% 2ND DIAGONAL STENOSIS.  20-30% MID LAD STENSOIS. 90% MID OM2 STENOSIS. PATENT RCA. NORMAL LV FUNCTION W/EF MORE THAN 55%.     EYE SURGERY      HIP ARTHROPLASTY Right 11/07/2017    SKIN BIOPSY      TONSILLECTOMY      TUMOR EXCISION  4 YRS AGO    RIGHT EAR       Social History:  Social History     Socioeconomic History    Marital status: Single     Spouse name: Not on file    Number of children: Not on file    Years of education: Not on file    Highest education level: Not on file   Occupational History    Not on file   Tobacco Use    Smoking status: Former     Packs/day: 2.00     Years: 15.00     Pack years: 30.00     Types: Cigarettes    Smokeless tobacco: Never    Tobacco comments:     quit 45 years ago   Vaping Use    Vaping Use: Never used   Substance and Sexual Activity    Alcohol use: Yes     Comment: Socially/ coffee daily     Drug use: No    Sexual activity: Not on file   Other Topics Concern    Not on file   Social History Narrative    Not on file     Social Determinants of Health     Financial Resource Strain: Not on file   Food Insecurity: Not on file   Transportation Needs: Not on file   Physical Activity: Not on file   Stress: Not on file   Social Connections: Not on file   Intimate Partner Violence: Not on file   Housing Stability: Not on file       Family History:  Family History   Problem Relation Age of Onset    Hypertension Sister     Cancer Brother     Cancer Sister        Review of Systems:  Constitutional: Denies fevers, chills, or weight loss. HEENT: Denies visual changes or hearing loss. Heart: As per HPI. Lungs: Denies shortness of breath, cough, or wheezing. Gastrointestinal: Denies nausea, vomiting, constipation, or diarrhea. Genitourinary: Denies dysuria or hematuria. Psychiatric: Patient denies anxiety or depression. Neurologic: Patient denies weakness of the extremities, dizziness, or headaches. All other ROS checked and found to be negative. Objective:  General Appearance: Pleasant 80y.o. year old male who appears stated age. Communicates well, no acute distress. HEENT: Head is normocephalic, atraumatic. EOMs intact, PERRL. Trachea midline. Lungs: Normal respiratory rate and normal effort. He is not in respiratory distress. Breath sounds clear to auscultation. No wheezes. Heart: Normal rate. Regular rhythm. S1 normal and S2 normal. Positive for murmur. Chest: Symmetric chest wall expansion. Extremities: Normal range of motion. Neurological: Patient is alert and oriented to person, place and time. Skin: Warm and dry. Abdomen: Abdomen is soft and non-distended. Bowel sounds are normal.   Pulses: Distal pulses are intact. Skin: Warm and dry without lesions.         Assessment:   Patient Active Problem List   Diagnosis    CAD (coronary artery disease)    COPD (chronic obstructive pulmonary disease) (HCC)    Benign prostatic hyperplasia    Hyperlipidemia    Hypertension    Glaucoma    Valvular heart disease       Severe, symptomatic AS      Plan:   Given his advanced age and frailty he is best served with TAVR as apposed to SAVR  He would like to pursue this at [address] [place] [address] as we do not currently have a structural heart cardiologist

## 2023-01-14 ENCOUNTER — APPOINTMENT (OUTPATIENT)
Dept: GENERAL RADIOLOGY | Age: 88
DRG: 177 | End: 2023-01-14
Payer: MEDICARE

## 2023-01-14 ENCOUNTER — APPOINTMENT (OUTPATIENT)
Dept: CT IMAGING | Age: 88
DRG: 177 | End: 2023-01-14
Payer: MEDICARE

## 2023-01-14 ENCOUNTER — HOSPITAL ENCOUNTER (INPATIENT)
Age: 88
LOS: 9 days | Discharge: HOME | DRG: 177 | End: 2023-01-24
Attending: STUDENT IN AN ORGANIZED HEALTH CARE EDUCATION/TRAINING PROGRAM | Admitting: INTERNAL MEDICINE
Payer: MEDICARE

## 2023-01-14 DIAGNOSIS — U07.1 COVID-19: ICD-10-CM

## 2023-01-14 DIAGNOSIS — J96.01 ACUTE RESPIRATORY FAILURE WITH HYPOXIA (HCC): Primary | ICD-10-CM

## 2023-01-14 DIAGNOSIS — I50.43 ACUTE ON CHRONIC COMBINED SYSTOLIC AND DIASTOLIC CHF (CONGESTIVE HEART FAILURE) (HCC): ICD-10-CM

## 2023-01-14 LAB
ALBUMIN SERPL-MCNC: 3.9 G/DL (ref 3.5–5.2)
ALP BLD-CCNC: 82 U/L (ref 40–129)
ALT SERPL-CCNC: 18 U/L (ref 0–40)
ANION GAP SERPL CALCULATED.3IONS-SCNC: 13 MMOL/L (ref 7–16)
AST SERPL-CCNC: 28 U/L (ref 0–39)
BASOPHILS ABSOLUTE: 0.04 E9/L (ref 0–0.2)
BASOPHILS RELATIVE PERCENT: 0.5 % (ref 0–2)
BILIRUB SERPL-MCNC: 0.8 MG/DL (ref 0–1.2)
BUN BLDV-MCNC: 20 MG/DL (ref 6–23)
CALCIUM SERPL-MCNC: 8.9 MG/DL (ref 8.6–10.2)
CHLORIDE BLD-SCNC: 108 MMOL/L (ref 98–107)
CO2: 23 MMOL/L (ref 22–29)
CREAT SERPL-MCNC: 0.9 MG/DL (ref 0.7–1.2)
D DIMER: 568 NG/ML DDU
EOSINOPHILS ABSOLUTE: 0.07 E9/L (ref 0.05–0.5)
EOSINOPHILS RELATIVE PERCENT: 0.8 % (ref 0–6)
GFR SERPL CREATININE-BSD FRML MDRD: >60 ML/MIN/1.73
GLUCOSE BLD-MCNC: 79 MG/DL (ref 74–99)
HCT VFR BLD CALC: 51.4 % (ref 37–54)
HEMOGLOBIN: 16.9 G/DL (ref 12.5–16.5)
IMMATURE GRANULOCYTES #: 0.03 E9/L
IMMATURE GRANULOCYTES %: 0.4 % (ref 0–5)
INFLUENZA A BY PCR: NOT DETECTED
INFLUENZA B BY PCR: NOT DETECTED
LYMPHOCYTES ABSOLUTE: 1.42 E9/L (ref 1.5–4)
LYMPHOCYTES RELATIVE PERCENT: 16.6 % (ref 20–42)
MCH RBC QN AUTO: 31.6 PG (ref 26–35)
MCHC RBC AUTO-ENTMCNC: 32.9 % (ref 32–34.5)
MCV RBC AUTO: 96.1 FL (ref 80–99.9)
MONOCYTES ABSOLUTE: 0.81 E9/L (ref 0.1–0.95)
MONOCYTES RELATIVE PERCENT: 9.5 % (ref 2–12)
NEUTROPHILS ABSOLUTE: 6.17 E9/L (ref 1.8–7.3)
NEUTROPHILS RELATIVE PERCENT: 72.2 % (ref 43–80)
PDW BLD-RTO: 16.8 FL (ref 11.5–15)
PLATELET # BLD: 173 E9/L (ref 130–450)
PMV BLD AUTO: 10.3 FL (ref 7–12)
POTASSIUM REFLEX MAGNESIUM: 3.6 MMOL/L (ref 3.5–5)
PRO-BNP: ABNORMAL PG/ML (ref 0–450)
RBC # BLD: 5.35 E12/L (ref 3.8–5.8)
SARS-COV-2, NAAT: DETECTED
SODIUM BLD-SCNC: 144 MMOL/L (ref 132–146)
TOTAL PROTEIN: 6.9 G/DL (ref 6.4–8.3)
TROPONIN, HIGH SENSITIVITY: 44 NG/L (ref 0–11)
TROPONIN, HIGH SENSITIVITY: 48 NG/L (ref 0–11)
TROPONIN, HIGH SENSITIVITY: 50 NG/L (ref 0–11)
WBC # BLD: 8.5 E9/L (ref 4.5–11.5)

## 2023-01-14 PROCEDURE — 85378 FIBRIN DEGRADE SEMIQUANT: CPT

## 2023-01-14 PROCEDURE — 71275 CT ANGIOGRAPHY CHEST: CPT

## 2023-01-14 PROCEDURE — 36415 COLL VENOUS BLD VENIPUNCTURE: CPT

## 2023-01-14 PROCEDURE — 99285 EMERGENCY DEPT VISIT HI MDM: CPT

## 2023-01-14 PROCEDURE — 93005 ELECTROCARDIOGRAM TRACING: CPT | Performed by: STUDENT IN AN ORGANIZED HEALTH CARE EDUCATION/TRAINING PROGRAM

## 2023-01-14 PROCEDURE — 6370000000 HC RX 637 (ALT 250 FOR IP): Performed by: STUDENT IN AN ORGANIZED HEALTH CARE EDUCATION/TRAINING PROGRAM

## 2023-01-14 PROCEDURE — 80053 COMPREHEN METABOLIC PANEL: CPT

## 2023-01-14 PROCEDURE — 71045 X-RAY EXAM CHEST 1 VIEW: CPT

## 2023-01-14 PROCEDURE — 3E0333Z INTRODUCTION OF ANTI-INFLAMMATORY INTO PERIPHERAL VEIN, PERCUTANEOUS APPROACH: ICD-10-PCS | Performed by: INTERNAL MEDICINE

## 2023-01-14 PROCEDURE — 85025 COMPLETE CBC W/AUTO DIFF WBC: CPT

## 2023-01-14 PROCEDURE — 83880 ASSAY OF NATRIURETIC PEPTIDE: CPT

## 2023-01-14 PROCEDURE — 96374 THER/PROPH/DIAG INJ IV PUSH: CPT

## 2023-01-14 PROCEDURE — 96375 TX/PRO/DX INJ NEW DRUG ADDON: CPT

## 2023-01-14 PROCEDURE — 6360000004 HC RX CONTRAST MEDICATION: Performed by: RADIOLOGY

## 2023-01-14 PROCEDURE — 87635 SARS-COV-2 COVID-19 AMP PRB: CPT

## 2023-01-14 PROCEDURE — 6360000002 HC RX W HCPCS: Performed by: STUDENT IN AN ORGANIZED HEALTH CARE EDUCATION/TRAINING PROGRAM

## 2023-01-14 PROCEDURE — 84484 ASSAY OF TROPONIN QUANT: CPT

## 2023-01-14 PROCEDURE — 87502 INFLUENZA DNA AMP PROBE: CPT

## 2023-01-14 RX ORDER — FUROSEMIDE 10 MG/ML
40 INJECTION INTRAMUSCULAR; INTRAVENOUS ONCE
Status: COMPLETED | OUTPATIENT
Start: 2023-01-14 | End: 2023-01-14

## 2023-01-14 RX ORDER — DEXAMETHASONE SODIUM PHOSPHATE 4 MG/ML
10 INJECTION, SOLUTION INTRA-ARTICULAR; INTRALESIONAL; INTRAMUSCULAR; INTRAVENOUS; SOFT TISSUE ONCE
Status: COMPLETED | OUTPATIENT
Start: 2023-01-14 | End: 2023-01-14

## 2023-01-14 RX ADMIN — IOPAMIDOL 75 ML: 755 INJECTION, SOLUTION INTRAVENOUS at 22:16

## 2023-01-14 RX ADMIN — DEXAMETHASONE SODIUM PHOSPHATE 10 MG: 4 INJECTION, SOLUTION INTRAMUSCULAR; INTRAVENOUS at 23:29

## 2023-01-14 RX ADMIN — FUROSEMIDE 40 MG: 10 INJECTION, SOLUTION INTRAMUSCULAR; INTRAVENOUS at 20:05

## 2023-01-14 RX ADMIN — ASPIRIN 325 MG: 325 TABLET, COATED ORAL at 23:28

## 2023-01-14 ASSESSMENT — PAIN - FUNCTIONAL ASSESSMENT: PAIN_FUNCTIONAL_ASSESSMENT: NONE - DENIES PAIN

## 2023-01-14 NOTE — ED PROVIDER NOTES
[address] [facility]        Pt Name: Harpreet Kent  MRN: [id number]  [place] 3/20/1930  Date of evaluation: 1/14/2023  Provider: Timur Rosenthal DO  PCP: Aida Garg MD  Note Started: 6:17 PM EST 1/14/23    CHIEF COMPLAINT       Chief Complaint   Patient presents with    Shortness of Breath     Became sob at [facility], philip 40's, denies cp, denies dizziness       HISTORY OF PRESENT ILLNESS: 1 or more Elements   History From: patient and EMS    Limitations to history : None    Harpreet Kent is a 80 y.o. male who presents to the emergency department via EMS from [facility] for shortness of breath. Patient symptoms have been ongoing over the past several weeks, persistent, moderate in severity, nothing makes it better or worse. Apparently the patient became short of breath at [facility] today and per EMS his heart rate was down into the 40s. He states that he was just not feeling well. He has been experiencing a dry cough and subjective fevers and chills intermittently. Patient is being worked up for TAVR to be performed at the [facility] clinic and apparently this is supposed to be done within the next 10 days or so. She states that he does not typically follow-up with cardiology there but he has been seeing cardiology here, Dr. Remy Smart, as well in the past.  He denies any lightheadedness, dizziness, syncope, abdominal pain, nausea, vomiting, diarrhea, recent hospitalization, recent was, or other acute symptoms or concerns. He is not reliant on any oxygen at home. He did receive duo nebs from EMS and 125 of Solu-Medrol. Nursing Notes were all reviewed and agreed with or any disagreements were addressed in the HPI. REVIEW OF SYSTEMS :      Review of Systems    Positives and Pertinent negatives as per HPI.      SURGICAL HISTORY     Past Surgical History:   Procedure Laterality Date    CATARACT REMOVAL  11/10 & 1/11    LEFT & RIGHT    COLONOSCOPY  11/7/2011 DIVERTICULOSIS    CORONARY ANGIOPLASTY  10/02/02    2 STENTS OF OM2 WITH GOOD RESULTS. DIAGNOSTIC CARDIAC CATH LAB PROCEDURE  10/02/02    DONE SECONDARY TO RECURRENT CP & MILD ISCHEMIA OF ANTEROLATERAL WALL ON NUCLEAR, SHOWD 30-40% 2ND DIAGONAL STENOSIS.  20-30% MID LAD STENSOIS. 90% MID OM2 STENOSIS. PATENT RCA. NORMAL LV FUNCTION W/EF MORE THAN 55%. EYE SURGERY      HIP ARTHROPLASTY Right 11/07/2017    SKIN BIOPSY      TONSILLECTOMY      TUMOR EXCISION  4 YRS AGO    RIGHT EAR       [address]       Current Discharge Medication List        CONTINUE these medications which have NOT CHANGED    Details   Rosuvastatin Calcium 20 MG CPSP Take by mouth      acetaminophen (TYLENOL) 325 MG tablet Take 2 tablets by mouth every 6 hours as needed for Pain  Qty: 120 tablet, Refills: 0      aspirin 81 MG EC tablet Take 1 tablet by mouth 2 times daily DVT prophylaxis following hip replacement x 30 days  Qty: 60 tablet, Refills: 0      !! Multiple Vitamins-Minerals (OCUVITE) TABS oral tablet Take 1 tablet by mouth daily      tamsulosin (FLOMAX) 0.4 MG capsule Take 0.4 mg by mouth daily. !! Multiple Vitamins-Minerals (CENTRUM SILVER ADULT 50+) TABS Take 1 tablet by mouth daily. metoprolol (TOPROL-XL) 25 MG XL tablet Take 12.5 mg by mouth nightly      finasteride (PROSCAR) 5 MG tablet Take 5 mg by mouth daily. !! - Potential duplicate medications found. Please discuss with provider. ALLERGIES     Patient has no known allergies.     FAMILYHISTORY       Family History   Problem Relation Age of Onset    Hypertension Sister     Cancer Brother     Cancer Sister         SOCIAL HISTORY       Social History     Tobacco Use    Smoking status: Former     Packs/day: 2.00     Years: 15.00     Pack years: 30.00     Types: Cigarettes    Smokeless tobacco: Never    Tobacco comments:     quit 45 years ago   Vaping Use    Vaping Use: Never used   Substance Use Topics    Alcohol use: Yes     Comment: Socially/ coffee daily     Drug use: No       SCREENINGS        Alessandro Coma Scale  Eye Opening: Spontaneous  Best Verbal Response: Oriented  Best Motor Response: Obeys commands  Benton Coma Scale Score: 15                CIWA Assessment  BP: 99/70  Heart Rate: 62           PHYSICAL EXAM  1 or more Elements     ED Triage Vitals [01/14/23 1802]   BP Temp Temp src Heart Rate Resp SpO2 Height Weight   (!) 149/112 97 °F (36.1 °C) -- 89 18 -- -- 145 lb (65.8 kg)       Physical Exam    Constitutional/General: Alert and oriented x3, chronically ill-appearing but no acute distress  Head: Normocephalic and atraumatic  Eyes:  EOMI, conjunctiva normal, sclera non icteric  ENT:  Oropharynx clear, handling secretions, no trismus, no asymmetry of the posterior oropharynx or uvular edema  Neck: Supple, full ROM, no stridor, no meningeal signs  Respiratory: Slightly tachypneic. Lungs are diminished bilaterally with coarse breath sounds in the upper lobes anteriorly. No conversational dyspnea or respiratory distress at this time  Cardiovascular:  Regular rate. Regular rhythm. No murmurs, no gallops, no rubs. 2+ distal pulses. Equal extremity pulses. Chest: No chest wall tenderness  GI:  Abdomen Soft, Non tender, Non distended. +BS. No rebound, guarding, or rigidity. No pulsatile masses. Musculoskeletal: Moves all extremities x 4. Warm and well perfused, no clubbing, no cyanosis, mild edema to the bilateral lower extremities capillary refill <3 seconds  Integument: skin warm and dry. No rashes.    Neurologic: GCS 15, no focal deficits, symmetric strength 5/5 in the upper and lower extremities bilaterally  Psychiatric: Normal Affect      DIAGNOSTIC RESULTS   LABS:    Labs Reviewed   COVID-19, RAPID - Abnormal; Notable for the following components:       Result Value    SARS-CoV-2, NAAT DETECTED (*)     All other components within normal limits   CBC WITH AUTO DIFFERENTIAL - Abnormal; Notable for the following components: Hemoglobin 16.9 (*)     RDW 16.8 (*)     Lymphocytes % 16.6 (*)     Lymphocytes Absolute 1.42 (*)     All other components within normal limits   COMPREHENSIVE METABOLIC PANEL W/ REFLEX TO MG FOR LOW K - Abnormal; Notable for the following components:    Chloride 108 (*)     All other components within normal limits   TROPONIN - Abnormal; Notable for the following components:    Troponin, High Sensitivity 50 (*)     All other components within normal limits   BRAIN NATRIURETIC PEPTIDE - Abnormal; Notable for the following components:    Pro-BNP 14,442 (*)     All other components within normal limits   TROPONIN - Abnormal; Notable for the following components:    Troponin, High Sensitivity 44 (*)     All other components within normal limits   TROPONIN - Abnormal; Notable for the following components:    Troponin, High Sensitivity 48 (*)     All other components within normal limits   C-REACTIVE PROTEIN - Abnormal; Notable for the following components:    CRP 0.5 (*)     All other components within normal limits   COMPREHENSIVE METABOLIC PANEL - Abnormal; Notable for the following components:    Chloride 108 (*)     CO2 20 (*)     Glucose 122 (*)     All other components within normal limits   RAPID INFLUENZA A/B ANTIGENS   D-DIMER, QUANTITATIVE   MAGNESIUM   COMPREHENSIVE METABOLIC PANEL       As interpreted by me, the above displayed labs are abnormal. All other labs obtained during this visit were within normal range or not returned as of this dictation.         RADIOLOGY:   Non-plain film images such as CT, Ultrasound and MRI are read by the radiologist. Mirta Oglesby radiographic images are visualized and preliminarily interpreted by the ED Provider with the below findings:    Chest x-ray did not show evidence of acute infiltrate but there was some vascular congestion and very small effusions present    Interpretation per the Radiologist below, if available at the time of this note:    CTA PULMONARY W CONTRAST   Final Result   No evidence of pulmonary embolism. Mild interlobular septal thickening, which could suggest slight pulmonary   edema. Moderate bilateral pleural effusions. Clinical and laboratory   correlation recommended. XR CHEST PORTABLE   Final Result   No acute process. No results found. No results found. PROCEDURES   Unless otherwise noted below, none     Procedures    CRITICAL CARE TIME (.cct)   none    PAST MEDICAL HISTORY/Chronic Conditions Affecting Care      has a past medical history of Asthma, BPH (benign prostatic hypertrophy), CAD (coronary artery disease), COPD (chronic obstructive pulmonary disease) ([facility] [address] Glaucoma, Hyperlipidemia, Hypertension, Preoperative clearance (10/10/2017), Retention of urine, and Valvular heart disease.      EMERGENCY DEPARTMENT COURSE    Vitals:    Vitals:    01/15/23 2000 01/15/23 2228 01/16/23 0000 01/16/23 0147   BP:  118/69  99/70   Pulse: 83 (!) 102 (!) 102 62   Resp:  26  26   Temp:  97.6 °F (36.4 °C)  97.4 °F (36.3 °C)   TempSrc:  Oral  Axillary   SpO2:  (!) 88%  95%   Weight:           Patient was given the following medications:  Medications   acetaminophen (TYLENOL) tablet 650 mg (has no administration in time range)   aspirin EC tablet 81 mg (81 mg Oral Given 1/15/23 0854)   finasteride (PROSCAR) tablet 5 mg (5 mg Oral Given 1/15/23 0855)   ocuvite-lutein multivitamin 1 capsule (1 capsule Oral Given 1/15/23 0854)   enoxaparin (LOVENOX) injection 40 mg (40 mg SubCUTAneous Given 1/15/23 0855)   melatonin tablet 3 mg (has no administration in time range)   furosemide (LASIX) injection 20 mg (20 mg IntraVENous Given 1/15/23 0855)   potassium chloride (KLOR-CON M) extended release tablet 20 mEq (20 mEq Oral Given 1/15/23 0854)   atorvastatin (LIPITOR) tablet 40 mg (40 mg Oral Given 1/15/23 2101)   tamsulosin (FLOMAX) capsule 0.4 mg (has no administration in time range)   metoprolol succinate (TOPROL XL) extended release tablet 25 mg (25 mg Oral Given 1/15/23 1313)   dexamethasone (DECADRON) injection 6 mg (6 mg IntraVENous Given 1/15/23 2058)   furosemide (LASIX) injection 40 mg (40 mg IntraVENous Given 1/14/23 2005)   iopamidol (ISOVUE-370) 76 % injection 75 mL (75 mLs IntraVENous Given 1/14/23 2216)   aspirin EC tablet 325 mg (325 mg Oral Given 1/14/23 2328)   dexamethasone (DECADRON) injection 10 mg (10 mg IntraVENous Given 1/14/23 2329)   potassium chloride (KLOR-CON M) extended release tablet 40 mEq (40 mEq Oral Given 1/15/23 1314)       ED Course as of 01/16/23 0250   Sat Jan 14, 2023 2005 ECHO 11-30-22  Summary   Top normal left ventricle size. Ejection fraction is visually estimated at 45%. Mild to moderate centrally directed mitral insufficiency   Severe aortic stenosis. Mild aortic regurgitation. Normal tricuspid valve structure and function. RVSP is 45 mmHg. Pulmonary hypertension is mild . [KG]   2210 EKG: This EKG is signed and interpreted by me. Rate: 75  Rhythm: Sinus  Interpretation: Normal sinus rhythm with a left axis deviation, PVCs, right bundle branch block, nonspecific ST changes throughout, QT is prolonged, QTC is 524  Comparison: no previous EKG available    [KG]   2310 12/15 Saud Note CT Surgery   Mr. Mey Jimenes is a 80year old male with PMH of HTN, HLD, CAD s/p PCI OM2 (2002), COPD, glaucoma, BPH and valvular heart disease. Recent echocardiogram showed LVEF 45% with severe aortic stenosis (DAI 0.7, MG 45 mmHg, Vmax 4.3 m/s and DVI 0.19). also mild-moderate MR, mild AI and mild pHTN. [KG]   Sun Prince 15, 2023   0014 Consulted with Dr. Tori Toledo, cardiology at [facility], who states patient can be admitted here and does not need to be transferred to [place] at this point. [KG]   5447 Consulted  with Dr. Xavier Gordillo, hospitalist, who accepted for admission. [KG]      ED Course User Index  [KG] Loy Gibson, DO        Is this patient to be included in the SEP-1 Core Measure due to severe sepsis or septic shock?    No Exclusion criteria - the patient is NOT to be included for SEP-1 Core Measure due to: Infection is not suspected        Medical Decision Making/Differential Diagnosis:    CC/HPI Summary, Social Determinants of health, Records Reviewed, DDx, testing done/not done, ED Course, Reassessment, disposition considerations/shared decision making with patient, consults, disposition:        The patient is a 27-year-old male presents emergency department complaining of shortness of breath. He was on nonrebreather receiving a breathing treatment upon arrival.  Patient is ill-appearing but no acute distress. No known social determinants of health. Prior records were reviewed and it appears he has follow-up with cardiology, Dr. Danielle Dumont in the past.  Differential diagnosis includes but is not limited to COVID versus CHF versus pneumonia versus influenza. Patient found to have trace bilateral pleural effusions along with an elevated BNP and troponin. He is not having any chest pain. CTA did not show evidence of PE. However, he was also COVID-positive. Patient remained on nasal cannula oxygen as whenever he tried to stand up even on the nasal cannula oxygen he was dropping down into the 80s. Patient was treated with 10 mg of IV Decadron. He was given 325 of aspirin p.o. Along with 40 mEq of potassium p.o. and 40 mg of IV Lasix. Patient and family were requesting to go to [facility] clinic since that is where his current cardiologist and cardiothoracic surgeon is located, but I did discuss with him this was not related to the current visit that he is having and they would probably have to push back his surgery anyways. However, they were insistent that I call and talk to them. I did speak with cardiology at [facility] states the patient is stable to stay here at this time for treatment and that the patient can call them when he is discharged from the hospital and feeling better.   Consulted with hospitalist who accepted for admission. Patient was admitted to intermediate floor in stable condition. CONSULTS: (Who and What was discussed)  IP CONSULT TO CARDIOLOGY  IP CONSULT TO HOSPITALIST  Spoke with Dr. Puja Yuen (Medicine). Discussed case. They will admit this patient. I am the Primary Clinician of Record. FINAL IMPRESSION      1. Acute respiratory failure with hypoxia ([address]    2. COVID-19    3. Acute on chronic combined systolic and diastolic CHF (congestive heart failure) ([address]          DISPOSITION/PLAN     DISPOSITION Admitted 01/15/2023 12:33:41 AM      PATIENT REFERRED TO:  No follow-up provider specified.     DISCHARGE MEDICATIONS:  Current Discharge Medication List          DISCONTINUED MEDICATIONS:  Current Discharge Medication List                 (Please note that portions of this note were completed with a voice recognition program.  Efforts were made to edit the dictations but occasionally words are mis-transcribed.)    Maci Bell DO (electronically signed)           Maci Bell DO  01/16/23 0250

## 2023-01-14 NOTE — LETTER
[place] Department Medicaid  CERTIFICATION OF NECESSITY  FOR NON-EMERGENCY TRANSPORTATION   BY GROUND AMBULANCE      Individual Information   1. Name: Anika Brady 2. [place] Medicaid Billing Number:    3. Address:  [place] [address]      Transportation Provider Information   4. Provider Name:    5. [place] Medicaid Provider Number:  National Provider Identifier (NPI):      Certification  7. Criteria:  During transport, this individual requires:  [] Medical treatment or continuous     supervision by an EMT. [x] The administration or regulation of oxygen by another person. [] Supervised protective restraint. 8. Period Beginning Date:    5. Length   [x] Not more than 1 day(s)  [] One Year     Additional Information Relevant to Certification   10. Comments or Explanations, If Necessary or Appropriate   Acute respiratory failure with hypoxia ([address] Acute on chronic combined systolic and diastolic CHF (congestive heart failure), covid pneumonia as of 23, on 4L NC, alert and oriented x2     Certifying Practitioner Information   11. Name of Practitioner: Loy Cowart MD   12. [place] Medicaid Provider Number, If Applicable:  [address] Provider Identifier (NPI):      Signature Information   14. Date of Signature:  13. Name of Person Signin. Signature and Professional Designation:      [facility] [id number]  Rev. 2015    [address] [place] [address] Encounter Date/Time: [address] Account: [de-identified]    MRN: [id number]    Patient: Anika Brady    Contact Serial #: [id number]      ENCOUNTER          Patient Class: I Private Enc?   No Unit  [facility] 6405/6405-B   Hospital Service: MED   Encounter DX: Acute respiratory failur*   ADM Provider: Loy Cowart MD   Procedure:     ATT Provider: Loy Cowart MD   REF Provider:        Admission DX: Acute respiratory failure with hypoxia ([address] Acute on chronic combined systolic and diastolic CHF (congestive heart failure) ([address] COVID-19 and DX codes: J96.01, I50.43, U07.1      PATIENT                 Name: Batsheva Camacho : 3/20/1930 (89 yrs)   Address: [address] Sex: Male   [place] [facility] [address]         Marital Status: Single   Employer: [employer]         Lutheran: Confucianism   Primary Care Provider: Brandi Cifuentes MD         Primary Phone: [phone]   EMERGENCY CONTACT   Contact Name Legal Guardian? Relationship to Patient Home Phone Work Phone   1. Kaz Patel  2. *No Contact Specified*      Niece/Nephew                      GUARANTOR            Guarantor: Batsheva Camacho     : 3/20/1930   [address]  [facility] [address] Sex[place]   [place] [address]     Relation to Patient: Self       Home Phone: [phone]   Guarantor ID: [id number]       Work Phone:     Guarantor Employer: UNKNOWN         Status: UNKNOWN      COVERAGE  PRIMARY INSURANCE   Payor: [facility] MEDICARE Plan: [facility] OPTUM PLAN AARP [facility] *   Payor Address: ,          Group Number:   Insurance Type: INDEMNITY   Subscriber Name: [address] : 1930   Subscriber ID: [id number] Pat. Rel. to Sub: Self   SECONDARY INSURANCE   Payor:   Plan:     Payor Address:  ,           Group Number:   Insurance Type:     Subscriber Name:   Subscriber :     Subscriber ID:   Pat.  Rel. to Sub:           CSN: [id number]

## 2023-01-15 PROBLEM — J96.01 ACUTE RESPIRATORY FAILURE WITH HYPOXIA (HCC): Status: ACTIVE | Noted: 2023-01-15

## 2023-01-15 PROBLEM — I45.10 RBBB: Status: ACTIVE | Noted: 2023-01-15

## 2023-01-15 PROBLEM — I49.3 FREQUENT PVCS: Status: ACTIVE | Noted: 2023-01-15

## 2023-01-15 LAB
ALBUMIN SERPL-MCNC: 3.6 G/DL (ref 3.5–5.2)
ALP BLD-CCNC: 74 U/L (ref 40–129)
ALT SERPL-CCNC: 15 U/L (ref 0–40)
ANION GAP SERPL CALCULATED.3IONS-SCNC: 16 MMOL/L (ref 7–16)
AST SERPL-CCNC: 23 U/L (ref 0–39)
BILIRUB SERPL-MCNC: 0.9 MG/DL (ref 0–1.2)
BUN BLDV-MCNC: 20 MG/DL (ref 6–23)
C-REACTIVE PROTEIN: 0.5 MG/DL (ref 0–0.4)
CALCIUM SERPL-MCNC: 8.8 MG/DL (ref 8.6–10.2)
CHLORIDE BLD-SCNC: 108 MMOL/L (ref 98–107)
CO2: 20 MMOL/L (ref 22–29)
CREAT SERPL-MCNC: 0.9 MG/DL (ref 0.7–1.2)
GFR SERPL CREATININE-BSD FRML MDRD: >60 ML/MIN/1.73
GLUCOSE BLD-MCNC: 122 MG/DL (ref 74–99)
MAGNESIUM: 2.2 MG/DL (ref 1.6–2.6)
POTASSIUM SERPL-SCNC: 3.8 MMOL/L (ref 3.5–5)
SODIUM BLD-SCNC: 144 MMOL/L (ref 132–146)
TOTAL PROTEIN: 6.4 G/DL (ref 6.4–8.3)

## 2023-01-15 PROCEDURE — 6360000002 HC RX W HCPCS: Performed by: INTERNAL MEDICINE

## 2023-01-15 PROCEDURE — 99223 1ST HOSP IP/OBS HIGH 75: CPT | Performed by: INTERNAL MEDICINE

## 2023-01-15 PROCEDURE — 6370000000 HC RX 637 (ALT 250 FOR IP): Performed by: INTERNAL MEDICINE

## 2023-01-15 PROCEDURE — 80053 COMPREHEN METABOLIC PANEL: CPT

## 2023-01-15 PROCEDURE — 2140000000 HC CCU INTERMEDIATE R&B

## 2023-01-15 PROCEDURE — 36415 COLL VENOUS BLD VENIPUNCTURE: CPT

## 2023-01-15 PROCEDURE — 83735 ASSAY OF MAGNESIUM: CPT

## 2023-01-15 PROCEDURE — 86140 C-REACTIVE PROTEIN: CPT

## 2023-01-15 RX ORDER — LANOLIN ALCOHOL/MO/W.PET/CERES
3 CREAM (GRAM) TOPICAL NIGHTLY PRN
Status: DISCONTINUED | OUTPATIENT
Start: 2023-01-15 | End: 2023-01-24 | Stop reason: HOSPADM

## 2023-01-15 RX ORDER — POTASSIUM CHLORIDE 20 MEQ/1
40 TABLET, EXTENDED RELEASE ORAL ONCE
Status: COMPLETED | OUTPATIENT
Start: 2023-01-15 | End: 2023-01-15

## 2023-01-15 RX ORDER — DEXAMETHASONE SODIUM PHOSPHATE 10 MG/ML
6 INJECTION, SOLUTION INTRAMUSCULAR; INTRAVENOUS EVERY 24 HOURS
Status: DISCONTINUED | OUTPATIENT
Start: 2023-01-15 | End: 2023-01-15 | Stop reason: ALTCHOICE

## 2023-01-15 RX ORDER — FINASTERIDE 5 MG/1
5 TABLET, FILM COATED ORAL DAILY
Status: DISCONTINUED | OUTPATIENT
Start: 2023-01-15 | End: 2023-01-24 | Stop reason: HOSPADM

## 2023-01-15 RX ORDER — ATORVASTATIN CALCIUM 40 MG/1
40 TABLET, FILM COATED ORAL NIGHTLY
Status: DISCONTINUED | OUTPATIENT
Start: 2023-01-15 | End: 2023-01-24 | Stop reason: HOSPADM

## 2023-01-15 RX ORDER — ENOXAPARIN SODIUM 100 MG/ML
40 INJECTION SUBCUTANEOUS DAILY
Status: DISCONTINUED | OUTPATIENT
Start: 2023-01-15 | End: 2023-01-24 | Stop reason: HOSPADM

## 2023-01-15 RX ORDER — ACETAMINOPHEN 325 MG/1
650 TABLET ORAL EVERY 6 HOURS PRN
Status: DISCONTINUED | OUTPATIENT
Start: 2023-01-15 | End: 2023-01-24 | Stop reason: HOSPADM

## 2023-01-15 RX ORDER — METOPROLOL SUCCINATE 25 MG/1
12.5 TABLET, EXTENDED RELEASE ORAL NIGHTLY
Status: DISCONTINUED | OUTPATIENT
Start: 2023-01-15 | End: 2023-01-15

## 2023-01-15 RX ORDER — POTASSIUM CHLORIDE 20 MEQ/1
20 TABLET, EXTENDED RELEASE ORAL
Status: DISCONTINUED | OUTPATIENT
Start: 2023-01-15 | End: 2023-01-24 | Stop reason: HOSPADM

## 2023-01-15 RX ORDER — ASPIRIN 81 MG/1
81 TABLET ORAL DAILY
Status: DISCONTINUED | OUTPATIENT
Start: 2023-01-15 | End: 2023-01-24 | Stop reason: HOSPADM

## 2023-01-15 RX ORDER — FUROSEMIDE 10 MG/ML
20 INJECTION INTRAMUSCULAR; INTRAVENOUS DAILY
Status: DISCONTINUED | OUTPATIENT
Start: 2023-01-15 | End: 2023-01-16

## 2023-01-15 RX ORDER — TAMSULOSIN HYDROCHLORIDE 0.4 MG/1
0.4 CAPSULE ORAL
Status: DISCONTINUED | OUTPATIENT
Start: 2023-01-16 | End: 2023-01-24 | Stop reason: HOSPADM

## 2023-01-15 RX ORDER — VIT C/E/ZN/COPPR/LUTEIN/ZEAXAN 60 MG-6 MG
1 CAPSULE ORAL DAILY
Status: DISCONTINUED | OUTPATIENT
Start: 2023-01-15 | End: 2023-01-24 | Stop reason: HOSPADM

## 2023-01-15 RX ORDER — TAMSULOSIN HYDROCHLORIDE 0.4 MG/1
0.4 CAPSULE ORAL DAILY
Status: DISCONTINUED | OUTPATIENT
Start: 2023-01-15 | End: 2023-01-15

## 2023-01-15 RX ORDER — DEXAMETHASONE SODIUM PHOSPHATE 4 MG/ML
6 INJECTION, SOLUTION INTRA-ARTICULAR; INTRALESIONAL; INTRAMUSCULAR; INTRAVENOUS; SOFT TISSUE EVERY 24 HOURS
Status: DISCONTINUED | OUTPATIENT
Start: 2023-01-15 | End: 2023-01-24 | Stop reason: HOSPADM

## 2023-01-15 RX ORDER — METOPROLOL SUCCINATE 25 MG/1
25 TABLET, EXTENDED RELEASE ORAL DAILY
Status: DISCONTINUED | OUTPATIENT
Start: 2023-01-15 | End: 2023-01-24 | Stop reason: HOSPADM

## 2023-01-15 RX ADMIN — TAMSULOSIN HYDROCHLORIDE 0.4 MG: 0.4 CAPSULE ORAL at 08:55

## 2023-01-15 RX ADMIN — FUROSEMIDE 20 MG: 10 INJECTION, SOLUTION INTRAMUSCULAR; INTRAVENOUS at 08:55

## 2023-01-15 RX ADMIN — METOPROLOL SUCCINATE 25 MG: 25 TABLET, EXTENDED RELEASE ORAL at 13:13

## 2023-01-15 RX ADMIN — ASPIRIN 81 MG: 81 TABLET, COATED ORAL at 08:54

## 2023-01-15 RX ADMIN — ENOXAPARIN SODIUM 40 MG: 100 INJECTION SUBCUTANEOUS at 08:55

## 2023-01-15 RX ADMIN — POTASSIUM CHLORIDE 20 MEQ: 20 TABLET, EXTENDED RELEASE ORAL at 08:54

## 2023-01-15 RX ADMIN — POTASSIUM CHLORIDE 40 MEQ: 1500 TABLET, EXTENDED RELEASE ORAL at 13:14

## 2023-01-15 RX ADMIN — Medication 1 CAPSULE: at 08:54

## 2023-01-15 RX ADMIN — ATORVASTATIN CALCIUM 40 MG: 40 TABLET, FILM COATED ORAL at 21:01

## 2023-01-15 RX ADMIN — DEXAMETHASONE SODIUM PHOSPHATE 6 MG: 4 INJECTION, SOLUTION INTRAMUSCULAR; INTRAVENOUS at 20:58

## 2023-01-15 RX ADMIN — FINASTERIDE 5 MG: 5 TABLET, FILM COATED ORAL at 08:55

## 2023-01-15 NOTE — CONSULTS
INPATIENT CARDIOLOGY CONSULT    Name: Batsheva Camacho    Age: 80 y.o. Date of Admission: 1/14/2023  5:57 PM    Date of Service: 1/15/2023    Reason for Consultation: CHF, AS    Referring Physician: Angely Shrestha MD    Primary Cardiologist: Tricia May    History of Present Illness:   Batsheva Camacho is a 80 y.o. male who presented on 1/14/2023 for further evaluation of severe weakness and shortness of breath. States has been going on for months. He lives by himself and his nephew brought him to the ER for evaluation. Denies syncope or falls. Has chest pain but is unable to really elaborate denies chest pain or shortness of breath at rest at this time. Unclear what his ER presentation or course was, there is no ER documentation at this point. Looks like he was given some IV furosemide. He is net negative over 2 L. He is putting out clear urine. Apparently has been intermittently somewhat argumentative with staff and impulsive getting out of bed to urinate and not wanting to use the external catheter. Known history of CAD with remote PCI. Recently saw Dr. Tricia May in the office, echo showed severe aortic stenosis and mild LV dysfunction, he is referred to [facility] clinic for TAVR evaluation and scheduled for heart catheterization later this month. Review of Systems:   Complete review of systems negative except as described above. Past Medical History:  Past Medical History:   Diagnosis Date    Asthma     MILD, no issues    BPH (benign prostatic hypertrophy)     CAD (coronary artery disease)     follows with Dr Henry Eaton. COPD (chronic obstructive pulmonary disease) ([facility]     Glaucoma     patient unsure    Hyperlipidemia     Hypertension     Preoperative clearance 10/10/2017    cardiac, Dr Henry Eaton.       Retention of urine     Valvular heart disease     MV insufficiency, tricuspid regurgitation         Past Surgical History:  Past Surgical History:   Procedure Laterality Date    CATARACT REMOVAL  11/10 & 1/11    LEFT & RIGHT    COLONOSCOPY  11/7/2011    DIVERTICULOSIS    CORONARY ANGIOPLASTY  10/02/02    2 STENTS OF OM2 WITH GOOD RESULTS.    DIAGNOSTIC CARDIAC CATH LAB PROCEDURE  10/02/02    DONE SECONDARY TO RECURRENT CP & MILD ISCHEMIA OF ANTEROLATERAL WALL ON NUCLEAR, SHOWD 30-40% 2ND DIAGONAL STENOSIS.  20-30% MID LAD STENSOIS.  90% MID OM2 STENOSIS.  PATENT RCA.  NORMAL LV FUNCTION W/EF MORE THAN 55%.    EYE SURGERY      HIP ARTHROPLASTY Right 11/07/2017    SKIN BIOPSY      TONSILLECTOMY      TUMOR EXCISION  4 YRS AGO    RIGHT EAR       Family History:  Family History   Problem Relation Age of Onset    Hypertension Sister     Cancer Brother     Cancer Sister        Social History:  Social History     Tobacco Use    Smoking status: Former     Packs/day: 2.00     Years: 15.00     Pack years: 30.00     Types: Cigarettes    Smokeless tobacco: Never    Tobacco comments:     quit 45 years ago   Vaping Use    Vaping Use: Never used   Substance Use Topics    Alcohol use: Yes     Comment: Socially/ coffee daily     Drug use: No       Allergies:  No Known Allergies    Home Medications:  Prior to Admission medications    Medication Sig Start Date End Date Taking? Authorizing Provider   acetaminophen (TYLENOL) 325 MG tablet Take 2 tablets by mouth every 6 hours as needed for Pain 11/9/17 11/30/22  Kaz Sullivan MD   aspirin 81 MG EC tablet Take 1 tablet by mouth 2 times daily DVT prophylaxis following hip replacement x 30 days  Patient taking differently: Take 81 mg by mouth daily DVT prophylaxis following hip replacement x 30 days 11/9/17   Kaz Sullivan MD   Multiple Vitamins-Minerals (OCUVITE) TABS oral tablet Take 1 tablet by mouth daily    Historical Provider, MD   tamsulosin (FLOMAX) 0.4 MG capsule Take 0.4 mg by mouth daily. 12/15/14   Historical Provider, MD   Multiple Vitamins-Minerals (CENTRUM SILVER ADULT 50+) TABS Take 1 tablet by mouth daily.    Historical Provider, MD   metoprolol  (TOPROL-XL) 25 MG XL tablet Take 12.5 mg by mouth nightly    Historical Provider, MD   finasteride (PROSCAR) 5 MG tablet Take 5 mg by mouth daily.      Historical Provider, MD       Current Medications:    Current Facility-Administered Medications:     acetaminophen (TYLENOL) tablet 650 mg, 650 mg, Oral, Q6H PRN, Dee Dee Cassidy MD    aspirin EC tablet 81 mg, 81 mg, Oral, Daily, Dee Dee Cassidy MD    finasteride (PROSCAR) tablet 5 mg, 5 mg, Oral, Daily, Dee Dee Cassidy MD    metoprolol succinate (TOPROL XL) extended release tablet 12.5 mg, 12.5 mg, Oral, Nightly, Dee Dee Cassidy MD    ocuvite-lutein multivitamin 1 capsule, 1 capsule, Oral, Daily, Dee Dee Cassidy MD    tamsulosin (FLOMAX) capsule 0.4 mg, 0.4 mg, Oral, Daily, Dee Dee Cassidy MD    enoxaparin (LOVENOX) injection 40 mg, 40 mg, SubCUTAneous, Daily, Dee Dee Cassidy MD    dexamethasone (PF) (DECADRON) injection 6 mg, 6 mg, IntraVENous, Q24H, Dee Dee Cassidy MD    melatonin tablet 3 mg, 3 mg, Oral, Nightly PRN, Dee Dee Cassidy MD    furosemide (LASIX) injection 20 mg, 20 mg, IntraVENous, Daily, Dee Dee Cassidy MD    potassium chloride (KLOR-CON M) extended release tablet 20 mEq, 20 mEq, Oral, Daily with breakfast, Dee Dee Cassidy MD    Physical Exam:  BP 97/72   Pulse 83   Temp 97.6 °F (36.4 °C) (Oral)   Resp 22   Wt 145 lb (65.8 kg)   SpO2 96%   BMI 24.89 kg/m²   Wt Readings from Last 3 Encounters:   01/14/23 145 lb (65.8 kg)   12/15/22 145 lb (65.8 kg)   12/07/22 141 lb (64 kg)     Appearance: Elderly male, awake, alert, appears dyspneic, on nasal cannula  Skin: Intact, no rash  Head: Normocephalic, atraumatic  Eyes: EOMI, no conjunctival erythema  ENMT: No pharyngeal erythema, MMM, no rhinorrhea  Neck: Supple, mildly elevated JVP, no carotid bruits  Lungs: Diminished, few basilar rales  Cardiac: PMI nondisplaced, regular rhythm with a normal rate, frequent ectopy, normal S1 & S2, systolic murmur difficult to characterize given heavy breathing and inability to  hold his breath for more than a second  Abdomen: Soft, nontender, +bowel sounds  Extremities: Moves all extremities x 4, 1+ dependent lower extremity edema  Neurologic: No focal motor deficits apparent, normal mood and affect  Peripheral Pulses: Intact posterior tibial pulses bilaterally    Intake/Output:    Intake/Output Summary (Last 24 hours) at 1/15/2023 0851  Last data filed at 1/15/2023 0145  Gross per 24 hour   Intake --   Output 1550 ml   Net -1550 ml     No intake/output data recorded. Laboratory Tests:  Recent Labs     23  1830 01/15/23  0546    144   K 3.6 3.8   * 108*   CO2 23 20*   BUN 20 20   CREATININE 0.9 0.9   GLUCOSE 79 122*   CALCIUM 8.9 8.8     Lab Results   Component Value Date/Time    MG 2.3 06/10/2022 05:03 PM     Recent Labs     23  1830 01/15/23  0546   ALKPHOS 82 74   ALT 18 15   AST 28 23   PROT 6.9 6.4   BILITOT 0.8 0.9   LABALBU 3.9 3.6     Recent Labs     23  183   WBC 8.5   RBC 5.35   HGB 16.9*   HCT 51.4   MCV 96.1   MCH 31.6   MCHC 32.9   RDW 16.8*      MPV 10.3     Lab Results   Component Value Date    TROPONINI <0.01 2016     No results found for: INR, PROTIME  Lab Results   Component Value Date    TSH 0.939 2016     No results found for: LABA1C  No results found for: EAG  No results found for: CHOL  No results found for: TRIG  No results found for: HDL  No results found for: LDLCALC, LDLCHOLESTEROL  No results found for: LABVLDL, VLDL  No results found for: [facility]  Recent Labs     23  1830   PROBNP 14,442*       Cardiac Tests:  EK2023: Sinus rhythm with PACs and PVCs. RBBB QRS duration 130 ms    Telemetry: Sinus rhythm 70s with PACs and frequent PVCs and couplets    Chest X-ray:   23       Impression   No acute process. CTA chest  Personally reviewed. Moderate seems like an overcall on these effusions    Impression   No evidence of pulmonary embolism.        Mild interlobular septal thickening, which could suggest slight pulmonary   edema. Moderate bilateral pleural effusions. Clinical and laboratory   correlation recommended. Echocardiogram:   TTE 12/2022 [facility]   Summary   Top normal left ventricle size. Ejection fraction is visually estimated at 45%. Mild to moderate centrally directed mitral insufficiency   Severe aortic stenosis. Mild aortic regurgitation. Normal tricuspid valve structure and function. RVSP is 45 mmHg. Pulmonary hypertension is mild . Stress test:    Pharm stress 12/7/22  Gated SPECT left ventricular ejection fraction was calculated to be 36%, with global hypokinesis in the absence of regional wall motion abnormalities. Impression:    Electrocardiographically normal regadenoson infusion with a clinically nonischemic response. Myocardial perfusion imaging was normal.    Overall left ventricular systolic function was  mild to moderately impaired with global hypokinesis and no regional wall motion abnormalities with a dilated left ventricular chamber . 4. Intermediate risk pharmacologic stress test    Cardiac catheterization:   10/2002 2.75 x12, 2.75 x 8 [facility] -  OM2 on 10/2/2002 - Dr Mayra Ramirez    -------------------------------------------------------------------------------------------------------------------------------------------------------------  IMPRESSION:  Acute on chronic heart failure with reduced ejection fraction. proBNP 14,000  Minimally elevated hs-cTnT: 50-44-48 ng/L flat pattern likely chronic myocardial injury. No evidence of ACS  Frequent PVCs  Cardiomyopathy EF 40-45% by echo, 36% by SPECT.   Mixed ischemic, valvular  Severe aortic stenosis with mild AR, mild to moderate MR, mild TR  Coronary artery disease PCI to OM 2002  RBBB  COVID-19 pneumonia  Acute hypoxic respiratory failure  Bilateral pleural effusions  Hypertension  COPD  Hyperlipidemia    RECOMMENDATIONS:    Continue gentle IV diuresis furosemide 20 mg daily; avoid overly aggressive diuresis given severe AS  Remains at risk of significant decompensation given severe aortic stenosis  Strict I's and O's  Change diet to cardiac and low-sodium  GDMT: Increase metoprolol succinate to 25 mg daily especially with frequent PVCs  Afterload reduction when blood pressure tolerates, a bit hypotensive today  Add on magnesium, K> 4.0 and mag> 2.0 will give additional potassium chloride  Continue aspirin, add statin for CAD  Scheduled for heart catheterization up in [place] for pre-TAVR planning later this month  Further care per primary service    Thank you for allowing me to participate in your patient's care. Please feel free to contact me if you have any questions or concerns. Cristin Duarte MD, [facility]    NOTE: This report was transcribed using voice recognition software. Every effort was made to ensure accuracy; however, inadvertent computerized transcription errors may be present.

## 2023-01-15 NOTE — ED NOTES
Pt complaining about the external cath states,\"It's not enjoyable for me its works more for the hospital.\" 300cc clear yellow urine noted in canister. Pt states,\"I want it off. And I will stand to pee. \" Attempted to educate regarding O2 sats decrease with activity. Pt continues to talk over nurse. External cath removed. Linens changed per patient request. Urinal provided and bed alarm applied. Family at bedside-family voiced understanding of above.       Naveed Gay RN  01/14/23 0535

## 2023-01-15 NOTE — H&P
Tamra Miles MD FACP  Internal medicine   History and Physical      CHIEF COMPLAINT: Shortness of breath      HISTORY OF PRESENT ILLNESS:    Patient was seen on the floor earlier this morning at the main campus of [facility]  He was in isolation room due to [place] test being positive  He was a poor historian  Registered nurse at bedside  70-year-old  man being evaluated for TAVR at [facility] clinic for severe aortic stenosis  He claims that for the last 1 month he has not felt well with worsening shortness of breath  He does not know if he had fever or chills  Tested positive for COVID  Was in CHF  Responded very well to 1 dose of IV Lasix  Admitted for further management    Past Medical History:    Past Medical History:   Diagnosis Date    Asthma     MILD, no issues    BPH (benign prostatic hypertrophy)     CAD (coronary artery disease)     follows with Dr Alfreda Pablo. COPD (chronic obstructive pulmonary disease) ([facility] [address]     Glaucoma     patient unsure    Hyperlipidemia     Hypertension     Preoperative clearance 10/10/2017    cardiac, Dr Alfreda Pablo. Retention of urine     Valvular heart disease     MV insufficiency, tricuspid regurgitation         Past Surgical History:    Past Surgical History:   Procedure Laterality Date    CATARACT REMOVAL  11/10 & 1/11    LEFT & RIGHT    COLONOSCOPY  11/7/2011    DIVERTICULOSIS    CORONARY ANGIOPLASTY  10/02/02    2 STENTS OF OM2 WITH GOOD RESULTS. DIAGNOSTIC CARDIAC CATH LAB PROCEDURE  10/02/02    DONE SECONDARY TO RECURRENT CP & MILD ISCHEMIA OF ANTEROLATERAL WALL ON NUCLEAR, SHOWD 30-40% 2ND DIAGONAL STENOSIS.  20-30% MID LAD STENSOIS. 90% MID OM2 STENOSIS. PATENT RCA. NORMAL LV FUNCTION W/EF MORE THAN 55%.     EYE SURGERY      HIP ARTHROPLASTY Right 11/07/2017    SKIN BIOPSY      TONSILLECTOMY      TUMOR EXCISION  4 YRS AGO    RIGHT EAR       Medications Prior to Admission:    Medications Prior to Admission: Rosuvastatin Calcium 20 MG CPSP, Take by mouth  acetaminophen (TYLENOL) 325 MG tablet, Take 2 tablets by mouth every 6 hours as needed for Pain  aspirin 81 MG EC tablet, Take 1 tablet by mouth 2 times daily DVT prophylaxis following hip replacement x 30 days (Patient taking differently: Take 81 mg by mouth daily DVT prophylaxis following hip replacement x 30 days)  Multiple Vitamins-Minerals (OCUVITE) TABS oral tablet, Take 1 tablet by mouth daily  tamsulosin (FLOMAX) 0.4 MG capsule, Take 0.4 mg by mouth daily. Multiple Vitamins-Minerals (CENTRUM SILVER ADULT 50+) TABS, Take 1 tablet by mouth daily. metoprolol (TOPROL-XL) 25 MG XL tablet, Take 12.5 mg by mouth nightly  finasteride (PROSCAR) 5 MG tablet, Take 5 mg by mouth daily. Allergies:    Patient has no known allergies. Social History:    reports that he has quit smoking. His smoking use included cigarettes. He has a 30.00 pack-year smoking history. He has never used smokeless tobacco. He reports current alcohol use. He reports that he does not use drugs. Family History:   family history includes Cancer in his brother and sister; Hypertension in his sister. REVIEW OF SYSTEMS:  As above in the HPI, otherwise negative    PHYSICAL EXAM:    Vitals:  /70   Pulse 73   Temp 97.4 °F (36.3 °C) (Temporal)   Resp (!) 31   Wt 145 lb (65.8 kg)   SpO2 99%   BMI 24.89 kg/m²     General:  Awake, alert, oriented X 3. Somewhat confused  And combative  HEENT:  Normocephalic, atraumatic. Pupils equal, round, reactive to light. No scleral icterus. No conjunctival injection. No nasal discharge. Neck:  Supple  Heart:  RRR, no murmurs, gallops, rubs  Lungs:  CTA bilaterally, bilat symmetrical expansion, no wheeze, rales, or rhonchi  Abdomen:   Bowel sounds present, soft, nontender, no masses, no organomegaly, no peritoneal signs  Extremities:  No clubbing, cyanosis, or edema  Skin:  Warm and dry, no open lesions or rash  Neuro:  Cranial nerves 2-12 intact, no focal deficits  Generalized muscle atrophy noted  Breast: deferred  Rectal: deferred  Genitalia:  deferred    LABS:  Data reviewed      ASSESSMENT:      Principal Problem:    Acute respiratory failure with hypoxia (HCC)  COVID-positive test  Acute CHF/well compensated now  Severe aortic stenosis  Remote coronary artery disease      PLAN:  Gentle diuretic therapy  Oxygen supplements/wean as tolerated  Decadron IV  Resume other home medications    Ceci Varela MD  3:31 PM  1/15/2023

## 2023-01-15 NOTE — ED NOTES
Pt complaining of not being able to stand to urinate. Found standing bedside with family- patient O2 sat dropped to mid 80's on 4 LNC. Redirected back into bed. Patient stated \"You people are torturing me. I may as well leave I want to go home if you people won't let me pee the way I want to\". This RN attempted to educate patient and family at bedside of safety risks and need for remaining in bed, and why external catheter was attempted. Patient refused options presented by provider at this time as well. Increased agitation with multiple attempts to redirect and educate. Transport to take patient to CT. Patient found screaming and arguing with transporter and family member. RN attempted to redirect. Patient taken to CT scan at this time. Will continue to monitor.       Wero Clark RN  01/14/23 [address], RN  01/14/23 0331

## 2023-01-15 NOTE — PROGRESS NOTES
This nurse notified MD of tele showing SR with PAC/PVC and informed the MD that the med req was complete. Awaiting call back. Monitoring. Call back within reach.

## 2023-01-15 NOTE — PROGRESS NOTES
Message sent to Dr Nia Durant via perfect serve regarding parameters for Toprol XL as patient's BP was 97/72 this am.

## 2023-01-15 NOTE — ED NOTES
Pt returned from CT. Placed on monitor. Pt assisted to the side of the bed by family-family educated on getting patient up to side of bed and resp status.  Nephew states,\"if something happens it will be my f      Rickie Sandifer, RN  01/14/23 0614

## 2023-01-16 LAB
EKG ATRIAL RATE: 75 BPM
EKG P AXIS: 64 DEGREES
EKG P-R INTERVAL: 156 MS
EKG Q-T INTERVAL: 470 MS
EKG QRS DURATION: 130 MS
EKG QTC CALCULATION (BAZETT): 524 MS
EKG R AXIS: 28 DEGREES
EKG T AXIS: -5 DEGREES
EKG VENTRICULAR RATE: 75 BPM

## 2023-01-16 PROCEDURE — 2140000000 HC CCU INTERMEDIATE R&B

## 2023-01-16 PROCEDURE — 99233 SBSQ HOSP IP/OBS HIGH 50: CPT | Performed by: INTERNAL MEDICINE

## 2023-01-16 PROCEDURE — 97165 OT EVAL LOW COMPLEX 30 MIN: CPT

## 2023-01-16 PROCEDURE — 97530 THERAPEUTIC ACTIVITIES: CPT

## 2023-01-16 PROCEDURE — 93010 ELECTROCARDIOGRAM REPORT: CPT | Performed by: INTERNAL MEDICINE

## 2023-01-16 PROCEDURE — 6360000002 HC RX W HCPCS: Performed by: INTERNAL MEDICINE

## 2023-01-16 PROCEDURE — 97161 PT EVAL LOW COMPLEX 20 MIN: CPT

## 2023-01-16 PROCEDURE — 6370000000 HC RX 637 (ALT 250 FOR IP): Performed by: INTERNAL MEDICINE

## 2023-01-16 RX ORDER — METOPROLOL SUCCINATE 25 MG/1
25 TABLET, EXTENDED RELEASE ORAL DAILY
Qty: 30 TABLET | Refills: 3 | Status: SHIPPED | OUTPATIENT
Start: 2023-01-17

## 2023-01-16 RX ORDER — ASPIRIN 81 MG/1
81 TABLET ORAL DAILY
COMMUNITY
Start: 2023-01-16

## 2023-01-16 RX ORDER — HALOPERIDOL 5 MG/ML
2 INJECTION INTRAMUSCULAR ONCE
OUTPATIENT
Start: 2023-01-16

## 2023-01-16 RX ORDER — HALOPERIDOL 5 MG/ML
2 INJECTION INTRAMUSCULAR EVERY 6 HOURS PRN
Status: DISCONTINUED | OUTPATIENT
Start: 2023-01-16 | End: 2023-01-24 | Stop reason: HOSPADM

## 2023-01-16 RX ORDER — FUROSEMIDE 20 MG/1
20 TABLET ORAL
Qty: 30 TABLET | Refills: 5 | Status: SHIPPED | OUTPATIENT
Start: 2023-01-16

## 2023-01-16 RX ADMIN — ASPIRIN 81 MG: 81 TABLET, COATED ORAL at 08:28

## 2023-01-16 RX ADMIN — ENOXAPARIN SODIUM 40 MG: 100 INJECTION SUBCUTANEOUS at 08:29

## 2023-01-16 RX ADMIN — HALOPERIDOL LACTATE 2 MG: 5 INJECTION, SOLUTION INTRAMUSCULAR at 20:04

## 2023-01-16 RX ADMIN — POTASSIUM CHLORIDE 20 MEQ: 20 TABLET, EXTENDED RELEASE ORAL at 08:28

## 2023-01-16 RX ADMIN — ATORVASTATIN CALCIUM 40 MG: 40 TABLET, FILM COATED ORAL at 20:03

## 2023-01-16 RX ADMIN — TAMSULOSIN HYDROCHLORIDE 0.4 MG: 0.4 CAPSULE ORAL at 20:40

## 2023-01-16 RX ADMIN — FINASTERIDE 5 MG: 5 TABLET, FILM COATED ORAL at 08:29

## 2023-01-16 RX ADMIN — HALOPERIDOL LACTATE 2 MG: 5 INJECTION, SOLUTION INTRAMUSCULAR at 03:54

## 2023-01-16 RX ADMIN — METOPROLOL SUCCINATE 25 MG: 25 TABLET, EXTENDED RELEASE ORAL at 08:29

## 2023-01-16 ASSESSMENT — PAIN SCALES - GENERAL
PAINLEVEL_OUTOF10: 8
PAINLEVEL_OUTOF10: 8
PAINLEVEL_OUTOF10: 9

## 2023-01-16 NOTE — PROGRESS NOTES
Pt pulled out IV access for the 3rd time this shift. Very agitated. Won't put on gown or hospital socks. Stated he is a prisoner and wants to leave. And continues to refuse tele. MD aware. NNO. Monitoring. Call light within reach.

## 2023-01-16 NOTE — PROGRESS NOTES
Physical Therapy    Initial Assessment     Name: Ángel Latif  : 3/20/1930  MRN: [id number]      Date of Service: 2023    Evaluating PT: Dorie Leos, PT, DPT [id number]      Room #:  1163/4734-J  Diagnosis:  Acute respiratory failure with hypoxia ([facility] [J96.01]  Acute on chronic combined systolic and diastolic CHF (congestive heart failure) ([facility] [I50.43]  COVID-19 [U07.1]  PMHx/PSHx:   has a past medical history of Asthma, BPH (benign prostatic hypertrophy), CAD (coronary artery disease), COPD (chronic obstructive pulmonary disease) ([facility] Glaucoma, Hyperlipidemia, Hypertension, Preoperative clearance, Retention of urine, and Valvular heart disease. Precautions:  Fall Risk, Droplet Plus Isolation (COVID-19), O2, , Alarm    SUBJECTIVE:    Pt lives alone in a 2 story house with 2 stair(s) and 1 rail(s) to enter. Full flight of stairs and 1 rail to second floor. Pt ambulated without AD prior to admission. OBJECTIVE:   Initial Evaluation  Date: 23 Treatment Date: Short Term/ Long Term   Goals   AM-PAC 6 Clicks      Was pt agreeable to Eval/treatment? Yes     Does pt have pain? No current complaints of pain     Bed Mobility  Rolling: NT  Supine to sit: NT  Sit to supine: Min A  Scooting: SBA to HOB  Rolling: Independent   Supine to sit:  Independent   Sit to supine: Independent   Scooting: Independent    Transfers Sit to stand: Min A  Stand to sit: Min A  Stand pivot: Min A without AD  Sit to stand: Supervision  Stand to sit: Supervision  Stand pivot: Supervision without AD   Ambulation   40 feet without AD with Min A  >200 feet without AD with Supervision   Stair negotiation: ascended and descended NT  >4 step(s) with 1 rail(s) with SBA   ROM BUE: Refer to OT note  BLE: WFL     Strength BUE: Refer to OT note  BLE: WFL     Balance Sitting EOB: Supervision  Dynamic Standing: SBA without AD  Sitting EOB: Independent   Dynamic Standing: Independent      Pt is A & O x: 2-3 to person, place, and month. Sensation: Pt denies numbness and tingling of extremities. Edema: Unremarkable. Patient education  Pt educated on PT role in acute care setting. Patient response to education:   Pt verbalized understanding Pt demonstrated skill Pt requires further education in this area   Yes NA No      ASSESSMENT:    Conditions Requiring Skilled Therapeutic Intervention:    [x]Decreased strength     []Decreased ROM  [x]Decreased functional mobility  [x]Decreased balance   [x]Decreased endurance   []Decreased posture  []Decreased sensation  []Decreased coordination   []Decreased vision  [x]Decreased safety awareness   []Increased pain       Comments:    Pt was in chair upon room entry; agreeable to PT evaluation. Pt recently returned from ambulating to bathroom with . Pt was SOB. O2 sat was 96% on 4 L O2/min. Symptoms improved with rest. Pt has restless legs syndrome and had difficulty sitting still in chair. Pt ambulated around room without AD. Gait was slow and mildly unsteady. O2 sat was 99% after activity. Pt was positioned comfortably in bed. Pt was left in bed with all needs met at conclusion of session.  present    Treatment:  Patient practiced and was instructed in the following treatment:    Therapeutic activities:  Bed mobility: Pt was cued for technique during sit to supine. Transfers: Pt was cued for hand placement during sit <> stand transfers. Pt completed multiple transfers from various surfaces (chair x1, EOB x1, commode x1). Ambulation: Pt ambulated around room without AD. Pt was cued for safety. Vitals and symptoms were closely monitored throughout session. Pt's/family goals:  1. To return home. Prognosis is Fair for reaching above PT goals. Patient and or family understand(s) diagnosis, prognosis, and plan of care. Yes.     PHYSICAL THERAPY PLAN OF CARE:    PT POC is established based on physician order and patient diagnosis Referring provider/PT Order:    Start   Ordering Provider    01/16/23 1200  PT eval and treat  Start:  01/16/23 1200,   End:  01/16/23 1200,   ONE TIME,   Standing Count:  1 Occurrences,   Kip Lindquist MD      Diagnosis:  Acute respiratory failure with hypoxia ([facility] [address] [J96.01]  Acute on chronic combined systolic and diastolic CHF (congestive heart failure) ([facility] [address] [I50.43]  COVID-19 [U07.1]  Specific instructions for next treatment:  Progress activity. Current Treatment Recommendations:     [x] Strengthening to improve independence with functional mobility   [] ROM to improve independence with functional mobility   [x] Balance Training to improve static/dynamic balance and to reduce fall risk  [x] Endurance Training to improve activity tolerance during functional mobility   [x] Transfer Training to improve safety and independence with all functional transfers   [x] Gait Training to improve gait mechanics, endurance and assess need for appropriate assistive device  [] Stair Training in preparation for safe discharge home and/or into the community   [x] Positioning to prevent skin breakdown and contractures  [x] Safety and Education Training   [] Patient/Caregiver Education   [] HEP  [] Other     PT long term treatment goals are located in above grid    Frequency of treatments: 2-5x/week x 1-2 weeks. Time in  1315  Time out  1340    Total Treatment Time  10 minutes     Evaluation Time includes thorough review of current medical information, gathering information on past medical history/social history and prior level of function, completion of standardized testing/informal observation of tasks, assessment of data and education on plan of care and goals.     CPT codes:  [x] Low Complexity PT evaluation 08906  [] Moderate Complexity PT evaluation 08181  [] High Complexity PT evaluation 75334  [] PT Re-evaluation 70959  [] Gait training 70448 0 minutes  [] Manual therapy 86373 0 minutes  [x] Therapeutic activities 18081 10 minutes  [] Therapeutic exercises 51343 0 minutes  [] Neuromuscular reeducation 88882 0 minutes     Sierra Harris, PT, DPT  [id number]

## 2023-01-16 NOTE — CARE COORDINATION
Discharge order noted. Per nursing rounds, patient confused. Call made to patient's nephew, Aby Portillo for transition of care planning. He states the patient lives alone in a home, is independent, ambulates without a device and has two people who assist with errands only (Mon-Sun); patient does not have 24/7 assist. He reports the patient is alert and oriented at baseline, he spoke with his this morning and the patient is pretty confused; unable to return home. He mentioned the patient also has restless leg syndrome and takes ropinirole when needed, uses [facility] pharmacy ([place] and PCP is Esther Sampson. Marion Chandra states the patient is scheduled for a TAVR at [facility] on 1/25. Discussed discharge options, VLAD list emailed to Marion Corazon at [email or web address]; he will get with family for VLAD choices and follow-up with SW. Patient pending PT/OT evals; call made to therapy department for patient to be seen stat (home vs VLAD). The Plan for Transition of Care is related to the following treatment goals: discharge planning    The Patient and/or patient representative King Darion was provided with a choice of provider and agrees with the discharge plan. [x] Yes [] No    Freedom of choice list was provided with basic dialogue that supports the patient's individualized plan of care/goals, treatment preferences and shares the quality data associated with the providers.  [x] Yes [] No    Negin Arroyo, MSW, LSW [phone]

## 2023-01-16 NOTE — PROGRESS NOTES
INPATIENT CARDIOLOGY CONSULT    Name: Jalyn Dunne    Age: 80 y.o. Date of Admission: 1/14/2023  5:57 PM    Date of Service: 1/16/2023    Reason for Consultation: CHF, AS    Referring Physician: Jose Osorio MD    Primary Cardiologist: Maci Tuttle    Interim:  Visit done remotely due to ongoing treatment for COVID-19. Apparently patient has close follow-up with [facility] and being considered for TAVR at the [facility]. Chart record shows patient will be discharged and once discharged and follow-up with the [facility] cardiac center for a plan for TAVR. Past Medical History:  Past Medical History:   Diagnosis Date    Asthma     MILD, no issues    BPH (benign prostatic hypertrophy)     CAD (coronary artery disease)     follows with Dr Shari Velez. COPD (chronic obstructive pulmonary disease) ([facility] [address]     Glaucoma     patient unsure    Hyperlipidemia     Hypertension     Preoperative clearance 10/10/2017    cardiac, Dr Shari Velez. Retention of urine     Valvular heart disease     MV insufficiency, tricuspid regurgitation         Past Surgical History:  Past Surgical History:   Procedure Laterality Date    CATARACT REMOVAL  11/10 & 1/11    LEFT & RIGHT    COLONOSCOPY  11/7/2011    DIVERTICULOSIS    CORONARY ANGIOPLASTY  10/02/02    2 STENTS OF OM2 WITH GOOD RESULTS. DIAGNOSTIC CARDIAC CATH LAB PROCEDURE  10/02/02    DONE SECONDARY TO RECURRENT CP & MILD ISCHEMIA OF ANTEROLATERAL WALL ON NUCLEAR, SHOWD 30-40% 2ND DIAGONAL STENOSIS.  20-30% MID LAD STENSOIS. 90% MID OM2 STENOSIS. PATENT RCA. NORMAL LV FUNCTION W/EF MORE THAN 55%.     EYE SURGERY      HIP ARTHROPLASTY Right 11/07/2017    SKIN BIOPSY      TONSILLECTOMY      TUMOR EXCISION  4 YRS AGO    RIGHT EAR       Family History:  Family History   Problem Relation Age of Onset    Hypertension Sister     Cancer Brother     Cancer Sister        Social History:  Social History     Tobacco Use    Smoking status: Former     Packs/day: 2.00     Years: 15.00     Pack years: 30.00     Types: Cigarettes    Smokeless tobacco: Never    Tobacco comments:     quit 45 years ago   Vaping Use    Vaping Use: Never used   Substance Use Topics    Alcohol use: Yes     Comment: Socially/ coffee daily     Drug use: No       Allergies:  No Known Allergies    Home Medications:  Prior to Admission medications    Medication Sig Start Date End Date Taking? Authorizing Provider   aspirin 81 MG EC tablet Take 1 tablet by mouth daily DVT prophylaxis following hip replacement x 30 days 1/16/23  Yes Conchita Blackmon MD   metoprolol succinate (TOPROL XL) 25 MG extended release tablet Take 1 tablet by mouth daily 1/17/23  Yes Conchita Blackmon MD   furosemide (LASIX) 20 MG tablet Take 1 tablet by mouth three times a week 1/16/23  Yes Conchita Blackmon MD   Rosuvastatin Calcium 20 MG CPSP Take by mouth   Yes Historical Provider, MD   acetaminophen (TYLENOL) 325 MG tablet Take 2 tablets by mouth every 6 hours as needed for Pain 11/9/17 11/30/22  Simone Paz MD   Multiple Vitamins-Minerals (OCUVITE) TABS oral tablet Take 1 tablet by mouth daily    Historical Provider, MD   tamsulosin (FLOMAX) 0.4 MG capsule Take 0.4 mg by mouth daily. 12/15/14   Historical Provider, MD   Multiple Vitamins-Minerals (CENTRUM SILVER ADULT 50+) TABS Take 1 tablet by mouth daily. Historical Provider, MD   finasteride (PROSCAR) 5 MG tablet Take 5 mg by mouth daily.       Historical Provider, MD       Current Medications:    Current Facility-Administered Medications:     haloperidol lactate (HALDOL) injection 2 mg, 2 mg, IntraMUSCular, Q6H PRN, Conchita Blackmon MD, 2 mg at 01/16/23 0354    acetaminophen (TYLENOL) tablet 650 mg, 650 mg, Oral, Q6H PRN, Conchita Blackmon MD    aspirin EC tablet 81 mg, 81 mg, Oral, Daily, Conchita Blackmon MD, 81 mg at 01/16/23 0828    finasteride (PROSCAR) tablet 5 mg, 5 mg, Oral, Daily, Conchita Blackmon MD, 5 mg at 01/16/23 3199    ocuvite-lutein multivitamin 1 capsule, 1 capsule, Oral, Daily, Genaro Hernández MD, 1 capsule at 01/15/23 0854    enoxaparin (LOVENOX) injection 40 mg, 40 mg, SubCUTAneous, Daily, Genaro Hernández MD, 40 mg at 01/16/23 0829    melatonin tablet 3 mg, 3 mg, Oral, Nightly PRN, Genaro Hernández MD    potassium chloride (KLOR-CON M) extended release tablet 20 mEq, 20 mEq, Oral, Daily with breakfast, Genaro Hernández MD, 20 mEq at 01/16/23 7941    atorvastatin (LIPITOR) tablet 40 mg, 40 mg, Oral, Nightly, Helen Stephenson MD, 40 mg at 01/15/23 2101    tamsulosin (FLOMAX) capsule 0.4 mg, 0.4 mg, Oral, QHS, Helen Stephenson MD    metoprolol succinate (TOPROL XL) extended release tablet 25 mg, 25 mg, Oral, Daily, Helen Stephenson MD, 25 mg at 01/16/23 7752    dexamethasone (DECADRON) injection 6 mg, 6 mg, IntraVENous, Q24H, Genaro Hernández MD, 6 mg at 01/15/23 2058    Physical Exam:  BP (!) 106/55   Pulse 56   Temp 98.4 °F (36.9 °C)   Resp 14   Wt 145 lb (65.8 kg)   SpO2 98%   BMI 24.89 kg/m²   Wt Readings from Last 3 Encounters:   01/14/23 145 lb (65.8 kg)   12/15/22 145 lb (65.8 kg)   12/07/22 141 lb (64 kg)     Appearance: Elderly male, awake, alert, appears dyspneic, on nasal cannula  Skin: Intact, no rash  Head: Normocephalic, atraumatic  Eyes: EOMI, no conjunctival erythema  ENMT: No pharyngeal erythema, MMM, no rhinorrhea  Neck: Supple, mildly elevated JVP, no carotid bruits  Lungs: Diminished, few basilar rales  Cardiac: PMI nondisplaced, regular rhythm with a normal rate, frequent ectopy, normal S1 & S2, systolic murmur difficult to characterize given heavy breathing and inability to hold his breath for more than a second  Abdomen: Soft, nontender, +bowel sounds  Extremities: Moves all extremities x 4, 1+ dependent lower extremity edema  Neurologic: No focal motor deficits apparent, normal mood and affect  Peripheral Pulses: Intact posterior tibial pulses bilaterally    Intake/Output:    Intake/Output Summary (Last 24 hours) at 1/16/2023 2780  Last data filed at 2023 0957  Gross per 24 hour   Intake 0 ml   Output 125 ml   Net -125 ml     No intake/output data recorded. Laboratory Tests:  Recent Labs     23  1830 01/15/23  0546    144   K 3.6 3.8   * 108*   CO2 23 20*   BUN 20 20   CREATININE 0.9 0.9   GLUCOSE 79 122*   CALCIUM 8.9 8.8     Lab Results   Component Value Date/Time    MG 2.2 01/15/2023 05:46 AM     Recent Labs     23  1830 01/15/23  0546   ALKPHOS 82 74   ALT 18 15   AST 28 23   PROT 6.9 6.4   BILITOT 0.8 0.9   LABALBU 3.9 3.6     Recent Labs     23  183   WBC 8.5   RBC 5.35   HGB 16.9*   HCT 51.4   MCV 96.1   MCH 31.6   MCHC 32.9   RDW 16.8*      MPV 10.3     Lab Results   Component Value Date    TROPONINI <0.01 2016     No results found for: INR, PROTIME  Lab Results   Component Value Date    TSH 0.939 2016     No results found for: LABA1C  No results found for: EAG  No results found for: CHOL  No results found for: TRIG  No results found for: HDL  No results found for: LDLCALC, LDLCHOLESTEROL  No results found for: LABVLDL, VLDL  No results found for: [facility]  Recent Labs     23  1830   PROBNP 14,442*       Cardiac Tests:  EK2023: Sinus rhythm with PACs and PVCs. RBBB QRS duration 130 ms    Telemetry: Sinus rhythm 70s with PACs and frequent PVCs and couplets    Chest X-ray:   23       Impression   No acute process. CTA chest  Personally reviewed. Moderate seems like an overcall on these effusions    Impression   No evidence of pulmonary embolism. Mild interlobular septal thickening, which could suggest slight pulmonary   edema. Moderate bilateral pleural effusions. Clinical and laboratory   correlation recommended. Echocardiogram:   TTE 2022 [facility]   Summary   Top normal left ventricle size. Ejection fraction is visually estimated at 45%. Mild to moderate centrally directed mitral insufficiency   Severe aortic stenosis. Mild aortic regurgitation. Normal tricuspid valve structure and function. RVSP is 45 mmHg. Pulmonary hypertension is mild . Stress test:    Pharm stress 12/7/22  Gated SPECT left ventricular ejection fraction was calculated to be 36%, with global hypokinesis in the absence of regional wall motion abnormalities. Impression:    Electrocardiographically normal regadenoson infusion with a clinically nonischemic response. Myocardial perfusion imaging was normal.    Overall left ventricular systolic function was  mild to moderately impaired with global hypokinesis and no regional wall motion abnormalities with a dilated left ventricular chamber . 4. Intermediate risk pharmacologic stress test    Cardiac catheterization:   10/2002 2.75 x12, 2.75 x 8 [facility] -  OM2 on 10/2/2002 - Dr Dhiraj Caicedo    -------------------------------------------------------------------------------------------------------------------------------------------------------------  IMPRESSION:  Acute on chronic heart failure with reduced ejection fraction. proBNP 14,000  Minimally elevated hs-cTnT: 50-44-48 ng/L flat pattern likely chronic myocardial injury. No evidence of ACS  Frequent PVCs  Cardiomyopathy EF 40-45% by echo, 36% by SPECT. Mixed ischemic, valvular  Severe aortic stenosis with mild AR, mild to moderate MR, mild TR  Coronary artery disease PCI to OM 2002  RBBB  COVID-19 pneumonia  Acute hypoxic respiratory failure  Bilateral pleural effusions  Hypertension  COPD  Hyperlipidemia    RECOMMENDATIONS:  Apparently patient has close follow-up with [facility] and being considered for TAVR at the [facility]. Chart record shows patient will be discharged and once discharged and follow-up with the [facility] cardiac center for a plan for TAVR. Follow-up with your cardiologist in the outpatient with [facility] for an ischemic and TAVR evaluation.     Continue on cardiac medications as blood pressure allows   Avoid hypotension given severe aortic stenosis and discussed Entresto and guideline directed medical therapy with [facility], [facility] clinic especially after TAVR for optimal guideline directed medical therapy. Thank you for allowing me to participate in your patient's care. Please feel free to contact me if you have any questions or concerns.     Jack Gtz MD  [facility]) Cardiology

## 2023-01-16 NOTE — CARE COORDINATION
Patient evaluated by PT/OT; [facility] score 16/24, ambulated 40ft no AD. Call made to patient's nephew, Amee Martinez to provide update regarding PT/OT deepthi. Informed him patient is now on 4L NC. He states patient is not on oxygen at baseline and has questions for the doctor. He expressed frustration with not knowing what is going on with the patient, admits this is all new and would like a call from the doctor. If patient requires oxygen, he has no preference on DME company. He states he has reviewed the VLAD list and is looking at getting the patient into the [facility] for a respite stay and has a call out to someone there. Ambulating pulse-ox testing will need completed. Tentative referral for home oxygen made to Royal Garcia at [facility]. The Plan for Transition of Care is related to the following treatment goals: discharge planning    The Patient and/or patient representative Linda Lovell was provided with a choice of provider and agrees with the discharge plan. [x] Yes [] No    Freedom of choice list was provided with basic dialogue that supports the patient's individualized plan of care/goals, treatment preferences and shares the quality data associated with the providers.  [x] Yes [] No     Vamshi Hirsch, MSW, LSW [phone]

## 2023-01-16 NOTE — PROGRESS NOTES
Alf Easley MD FACP  Internal medicine  Progress Notes      CHIEF COMPLAINT: Shortness of breath      HISTORY OF PRESENT ILLNESS:    1/15/2023  Patient was seen on the floor earlier this morning at the main campus of [facility]  He was in isolation room due to Ardyrubi Messier test being positive  He was a poor historian  Registered nurse at bedside  71-year-old  man being [facility] severe [facility] stenosis  He claims that for the last 1 month he has not felt well with worsening shortness of breath  He does not know if he had fever or chills  Tested positive for COVID  Was in CHF  Responded very well to 1 dose of IV Lasix  Admitted for further management  1/16/2023  Patient was seen on the floor earlier this morning  Overnight combativeness reported  He was pulling IVs out and wanted to go home  This morning he is fully oriented and wants to go home  Not requiring any supplemental oxygen  No dyspnea or acute distress  Past Medical History:    Past Medical History:   Diagnosis Date    Asthma     MILD, no issues    BPH (benign prostatic hypertrophy)     CAD (coronary artery disease)     follows with Dr June Quan. COPD (chronic obstructive pulmonary disease) ([facility] [address]     Glaucoma     patient unsure    Hyperlipidemia     Hypertension     Preoperative clearance 10/10/2017    cardiac, Dr June Quan. Retention of urine     Valvular heart disease     MV insufficiency, tricuspid regurgitation         Past Surgical History:    Past Surgical History:   Procedure Laterality Date    CATARACT REMOVAL  11/10 & 1/11    LEFT & RIGHT    COLONOSCOPY  11/7/2011    DIVERTICULOSIS    CORONARY ANGIOPLASTY  10/02/02    2 STENTS OF OM2 WITH GOOD RESULTS. DIAGNOSTIC CARDIAC CATH LAB PROCEDURE  10/02/02    DONE SECONDARY TO RECURRENT CP & MILD ISCHEMIA OF ANTEROLATERAL WALL ON NUCLEAR, SHOWD 30-40% 2ND DIAGONAL STENOSIS.  20-30% MID LAD STENSOIS. 90% MID OM2 STENOSIS. PATENT RCA.   NORMAL LV FUNCTION W/EF MORE THAN 55%. EYE SURGERY      HIP ARTHROPLASTY Right 11/07/2017    SKIN BIOPSY      TONSILLECTOMY      TUMOR EXCISION  4 YRS AGO    RIGHT EAR       Medications Prior to Admission:    Medications Prior to Admission: Rosuvastatin Calcium 20 MG CPSP, Take by mouth  acetaminophen (TYLENOL) 325 MG tablet, Take 2 tablets by mouth every 6 hours as needed for Pain  Multiple Vitamins-Minerals (OCUVITE) TABS oral tablet, Take 1 tablet by mouth daily  tamsulosin (FLOMAX) 0.4 MG capsule, Take 0.4 mg by mouth daily. Multiple Vitamins-Minerals (CENTRUM SILVER ADULT 50+) TABS, Take 1 tablet by mouth daily. finasteride (PROSCAR) 5 MG tablet, Take 5 mg by mouth daily. [DISCONTINUED] metoprolol (TOPROL-XL) 25 MG XL tablet, Take 12.5 mg by mouth nightly    Allergies:    Patient has no known allergies. Social History:    reports that he has quit smoking. His smoking use included cigarettes. He has a 30.00 pack-year smoking history. He has never used smokeless tobacco. He reports current alcohol use. He reports that he does not use drugs. Family History:   family history includes Cancer in his brother and sister; Hypertension in his sister. REVIEW OF SYSTEMS:  As above in the HPI, otherwise negative    PHYSICAL EXAM:    Vitals:  BP (!) 135/95   Pulse 65   Temp 98 °F (36.7 °C) (Oral)   Resp 22   Wt 145 lb (65.8 kg)   SpO2 95%   BMI 24.89 kg/m²     General:  Awake, alert, oriented X 3. Somewhat confused  And combative  HEENT:  Normocephalic, atraumatic. Pupils equal, round, reactive to light. No scleral icterus. No conjunctival injection. No nasal discharge. Neck:  Supple  Heart:  RRR, no murmurs, gallops, rubs  Lungs:  CTA bilaterally, bilat symmetrical expansion, no wheeze, rales, or rhonchi  Abdomen:   Bowel sounds present, soft, nontender, no masses, no organomegaly, no peritoneal signs  Extremities:  No clubbing, cyanosis, or edema  Skin:  Warm and dry, no open lesions or rash  Neuro: Cranial nerves 2-12 intact, no focal deficits  Generalized muscle atrophy noted  Breast: deferred  Rectal: deferred  Genitalia:  deferred    LABS:  Data reviewed      ASSESSMENT:      Principal Problem:    Acute respiratory failure with hypoxia (HCC)  COVID-positive test  Acute CHF/well compensated now  Severe aortic stenosis  Remote coronary artery disease      PLAN:  No need for steroids  Discharged home on Lasix 20 mg 3 times a week  Follow-up with [facility] clinic  Resume other home medications    Lin Murray MD  10:10 AM  1/16/2023

## 2023-01-16 NOTE — PROGRESS NOTES
Occupational Therapy  OCCUPATIONAL THERAPY INITIAL EVALUATION     [address] [address] [address]      TPMI:[id number]                                                Patient Name: Nicole Galvan  MRN: [id number]  : 3/20/1930  Room: [address] [place] [address]    Evaluating OT: Larissa Jaramillo OTR/L #[id number]     Referring Provider: Michelle Gonsalves MD  Specific Provider Orders/Date: OT eval and treat 23    Diagnosis: Acute respiratory failure with hypoxia ([facility] [J96.01]  Acute on chronic combined systolic and diastolic CHF (congestive heart failure) ([facility] [I50.43]  COVID-19 [U07.1]   Pt admitted to hospital with SOB     Pertinent Medical History:  has a past medical history of Asthma, BPH (benign prostatic hypertrophy), CAD (coronary artery disease), COPD (chronic obstructive pulmonary disease) ([facility] Glaucoma, Hyperlipidemia, Hypertension, Preoperative clearance, Retention of urine, and Valvular heart disease.        Precautions:  Fall Risk,  Droplet plus (COVID-19), , bed alarm, O2    Assessment of current deficits    [x] Functional mobility  [x]ADLs  [x] Strength               []Cognition    [x] Functional transfers   [x] IADLs         [x] Safety Awareness   [x]Endurance    [] Fine Coordination              [x] Balance      [] Vision/perception   []Sensation     []Gross Motor Coordination  [] ROM  [] Delirium                   [x] Motor Control     OT PLAN OF CARE   OT POC based on physician orders, patient diagnosis and results of clinical assessment    Frequency/Duration 1-3 days/wk for 2 weeks PRN   Specific OT Treatment Interventions to include:   * Instruction/training on adapted ADL techniques and AE recommendations to increase functional independence within precautions       * Training on energy conservation strategies, correct breathing pattern and techniques to improve independence/tolerance for self-care routine  * Functional transfer/mobility training/DME recommendations for increased independence, safety, and fall prevention  * Patient/Family education to increase follow through with safety techniques and functional independence  * Recommendation of environmental modifications for increased safety with functional transfers/mobility and ADLs  * Cognitive retraining/development of therapeutic activities to improve problem solving, judgement, memory, and attention for increased safety/participation in ADL/IADL tasks  * Therapeutic exercise to improve motor endurance, ROM, and functional strength for ADLs/functional transfers  * Therapeutic activities to facilitate/challenge dynamic balance, stand tolerance for increased safety and independence with ADLs  * Therapeutic activities to facilitate gross/fine motor skills for increased independence with ADLs  * Neuro-muscular re-education: facilitation of righting/equilibrium reactions, midline orientation, scapular stability/mobility, normalization of muscle tone, and facilitation of volitional active controled movement      Recommended Adaptive Equipment: TBD     Home Living: Pt lives alone in a 2 story home with with 2 AWA and 1 hand rail. Bed/ bath on 2nd floor   Bathroom setup: tub/shower combo    Equipment owned: none    Prior Level of Function: Independent with ADLs , Independent  with IADLs; ambulated without AD   Driving: no   Occupation: na    Pain Level: Pt denies pain this session    Cognition: A&O: x2 (self and place);  Follows 1 step directions   Memory:  F-   Sequencing:  F-   Problem solving:  P+   Judgement/safety:  P+     Functional Assessment:  AM-PAC Daily Activity Raw Score: 16/24   Initial Eval Status  Date: 1/16/23 Treatment Status  Date: STGs = LTGs  Time frame: 10-14 days   Feeding Independent      Grooming Minimal Assist     Standing   Modified [place]    UB Dressing Stand by Assist   Modified [place]    LB Dressing Moderate Assist   Modified [place] Bathing Moderate Assist  Modified [place]    Toileting Moderate Assist   Modified [place]    Bed Mobility  Supine to sit: NT  Sit to supine: Minimal Assist   Supine to sit: Modified [place]   Sit to supine: Modified [place]    Functional Transfers Minimal Assist   Modified [place]    Functional Mobility Minimal Assist      Ambulated in room without AD; mild unsteadiness  Modified [place]    Balance Sitting:     Static:  SBA    Dynamic: SBA  Standing: Mod A     Activity Tolerance P+  F+   Visual/  Perceptual Glasses: yes  wfl                  Hand Dominance right   Strength ROM Additional Info:    RUE   4-/5 wfl good  and wfl FMC/dexterity noted during ADL tasks     LUE 4-/5 wfl good  and wfl FMC/dexterity noted during ADL tasks     Hearing: wfl  Sensation:wfl  Tone: wfl  Edema:none noted     Comments: Upon arrival patient in bed and agreeable to OT Session. Therapist educated pt on role of OT. At end of session, patient semi-supine in bed with call light and phone within reach, all lines and tubes intact. Overall patient demonstrated decreased independence and safety during completion of ADL/functional transfer/mobility tasks. Pt would benefit from continued skilled OT to increase safety and independence with completion of ADL/IADL tasks for functional independence and quality of life.     Treatment: OT treatment provided this date includes: Facilitation of bed mobility, unsupported sitting balance (impacting ADLs; addressing posture, weight shifting, dynamic reaching), functional transfers (various surfaces), standing tolerance tasks (addressing posture, balance and activity tolerance while incorporating light functional reaching; impacting ADLs and functional activity) and functional ambulation tasks without AD (including to/ from bathroom and in preparation for item retrieval tasks; cuing on posture and safety) - skilled cuing on sequencing, hand placement, posture, body mechanics, energy conservation techniques and safety. Therapist facilitated self-care retraining: UB/LB self-care tasks (gown, socks) and grooming tasks while cuing on sequencing, modified techniques, posture, safety and energy conservation techniques. Skilled monitoring of HR, O2 sats and pts response to treatment (+ SOB with activity however O2 sat 96-99% throughout session). Rehab Potential: Good  for established goals     Patient / Family Goal: return home      Patient and/or family were instructed on functional diagnosis, prognosis/goals and OT plan of care. Demonstrated fair understanding. Eval Complexity: Low    Time In: 1315  Time Out: 1340  Total Treatment Time: 10 minutes    Min Units   OT Eval Low 97165  x  1   OT Eval Medium 49618      OT Eval High 74931      OT Re-Eval [id number]       Therapeutic Ex 72340       Therapeutic Activities 44750 [id number]  1   ADL/Self Care 92288  2     Orthotic Management 62268       Manual 57586     Neuro Re-Ed 02247       Non-Billable Time          Evaluation Time additionally includes thorough review of current medical information, gathering information on past medical history/social history and prior level of function, interpretation of standardized testing/informal observation of tasks, assessment of data and development of plan of care and goals.           Vernard Gaucher OTR/L #[id number]

## 2023-01-16 NOTE — PROGRESS NOTES
Pt is agitated and getting out of bed, pulling out IV's, taking off tele, and screaming at staff that he is a prisoner. Pt has had agitation all shift and stated he can't sleep in the hospital. Pt got up took all his clothes off went to the closet and removed his over-night bag and stated he was leaving. Pt continues to state he is in penitentiary. MD made aware. Given telephone order for 2 mg Haldol. Monitoring. Call light within reach.

## 2023-01-16 NOTE — ACP (ADVANCE CARE PLANNING)
Advance Care Planning   Healthcare Decision Maker:    Primary Decision Maker: Dori Hawthorne - [phone]    Today we documented Decision Maker(s). The patient will provide ACP documents. Spoke with patient's nephew, Walt Easley. He reports he is the patient's POA and will bring in documents.     Barbara Dempsey, MSW, LSW [phone]

## 2023-01-17 ENCOUNTER — APPOINTMENT (OUTPATIENT)
Dept: GENERAL RADIOLOGY | Age: 88
DRG: 177 | End: 2023-01-17
Payer: MEDICARE

## 2023-01-17 LAB
ALBUMIN SERPL-MCNC: 3.4 G/DL (ref 3.5–5.2)
ALP BLD-CCNC: 67 U/L (ref 40–129)
ALT SERPL-CCNC: 22 U/L (ref 0–40)
ANION GAP SERPL CALCULATED.3IONS-SCNC: 16 MMOL/L (ref 7–16)
AST SERPL-CCNC: 51 U/L (ref 0–39)
BILIRUB SERPL-MCNC: 0.9 MG/DL (ref 0–1.2)
BUN BLDV-MCNC: 37 MG/DL (ref 6–23)
CALCIUM SERPL-MCNC: 9.1 MG/DL (ref 8.6–10.2)
CHLORIDE BLD-SCNC: 101 MMOL/L (ref 98–107)
CO2: 18 MMOL/L (ref 22–29)
CREAT SERPL-MCNC: 0.9 MG/DL (ref 0.7–1.2)
GFR SERPL CREATININE-BSD FRML MDRD: >60 ML/MIN/1.73
GLUCOSE BLD-MCNC: 75 MG/DL (ref 74–99)
POTASSIUM SERPL-SCNC: 5 MMOL/L (ref 3.5–5)
SODIUM BLD-SCNC: 135 MMOL/L (ref 132–146)
TOTAL PROTEIN: 5.9 G/DL (ref 6.4–8.3)

## 2023-01-17 PROCEDURE — 2580000003 HC RX 258

## 2023-01-17 PROCEDURE — 2700000000 HC OXYGEN THERAPY PER DAY

## 2023-01-17 PROCEDURE — 36415 COLL VENOUS BLD VENIPUNCTURE: CPT

## 2023-01-17 PROCEDURE — 2140000000 HC CCU INTERMEDIATE R&B

## 2023-01-17 PROCEDURE — 6360000002 HC RX W HCPCS: Performed by: INTERNAL MEDICINE

## 2023-01-17 PROCEDURE — 80053 COMPREHEN METABOLIC PANEL: CPT

## 2023-01-17 PROCEDURE — 71045 X-RAY EXAM CHEST 1 VIEW: CPT

## 2023-01-17 PROCEDURE — 2500000003 HC RX 250 WO HCPCS: Performed by: INTERNAL MEDICINE

## 2023-01-17 PROCEDURE — 6370000000 HC RX 637 (ALT 250 FOR IP): Performed by: INTERNAL MEDICINE

## 2023-01-17 PROCEDURE — 99233 SBSQ HOSP IP/OBS HIGH 50: CPT | Performed by: INTERNAL MEDICINE

## 2023-01-17 RX ORDER — WATER 1000 ML/1000ML
INJECTION, SOLUTION INTRAVENOUS
Status: COMPLETED
Start: 2023-01-17 | End: 2023-01-17

## 2023-01-17 RX ORDER — OLANZAPINE 10 MG/1
5 INJECTION, POWDER, LYOPHILIZED, FOR SOLUTION INTRAMUSCULAR ONCE
Status: COMPLETED | OUTPATIENT
Start: 2023-01-17 | End: 2023-01-17

## 2023-01-17 RX ORDER — IPRATROPIUM BROMIDE AND ALBUTEROL SULFATE 2.5; .5 MG/3ML; MG/3ML
1 SOLUTION RESPIRATORY (INHALATION) 4 TIMES DAILY
Status: DISCONTINUED | OUTPATIENT
Start: 2023-01-17 | End: 2023-01-23

## 2023-01-17 RX ADMIN — Medication 1 CAPSULE: at 10:51

## 2023-01-17 RX ADMIN — OLANZAPINE 5 MG: 10 INJECTION, POWDER, LYOPHILIZED, FOR SOLUTION INTRAMUSCULAR at 18:15

## 2023-01-17 RX ADMIN — FINASTERIDE 5 MG: 5 TABLET, FILM COATED ORAL at 10:51

## 2023-01-17 RX ADMIN — WATER 10 ML: 1 INJECTION INTRAMUSCULAR; INTRAVENOUS; SUBCUTANEOUS at 18:15

## 2023-01-17 RX ADMIN — HALOPERIDOL LACTATE 2 MG: 5 INJECTION, SOLUTION INTRAMUSCULAR at 03:26

## 2023-01-17 RX ADMIN — HALOPERIDOL LACTATE 2 MG: 5 INJECTION, SOLUTION INTRAMUSCULAR at 15:10

## 2023-01-17 RX ADMIN — HALOPERIDOL LACTATE 2 MG: 5 INJECTION, SOLUTION INTRAMUSCULAR at 21:20

## 2023-01-17 RX ADMIN — ENOXAPARIN SODIUM 40 MG: 100 INJECTION SUBCUTANEOUS at 10:51

## 2023-01-17 RX ADMIN — POTASSIUM CHLORIDE 20 MEQ: 20 TABLET, EXTENDED RELEASE ORAL at 10:52

## 2023-01-17 RX ADMIN — METOPROLOL SUCCINATE 25 MG: 25 TABLET, EXTENDED RELEASE ORAL at 10:54

## 2023-01-17 RX ADMIN — ASPIRIN 81 MG: 81 TABLET, COATED ORAL at 10:52

## 2023-01-17 RX ADMIN — HALOPERIDOL LACTATE 2 MG: 5 INJECTION, SOLUTION INTRAMUSCULAR at 09:31

## 2023-01-17 ASSESSMENT — PAIN SCALES - GENERAL: PAINLEVEL_OUTOF10: 0

## 2023-01-17 NOTE — PROGRESS NOTES
Pt pulling off his clothes, continues to pull off his oxygen, pulling off his tele monitor and continues to climb out of bed, pt given haldol with no improvement, shyanne soft wrist restraints applied, Dr. Janelle Barry called and left message about restraints, Pt radha Alva also made aware that restraints were applied, Tristin Trejo brought up that pt may have sundowners, pt also making comments about how he wants to die and doesn't want to live anymore. Tristin Trejo also made aware of these comments and he thinks it is due to \"pt being very Anabaptism and wants to meet his maker and that this isnt what he really means\" will pass on to next RN of these comments.

## 2023-01-17 NOTE — CARE COORDINATION
Received a message from [facility] and she states they are unable to accept the patient. Call placed to Сергей Burton and provided update regarding denial. Informed him of additional VLAD choices being needed.;alternate VLAD choices are [address] and [address] [facility]  Call made to [facility], she is unable to accept due to covid dx; patient's nephew reported patient tested positive on home test a month ago. [facility] to [facility] [place] AT [place] at [address] [place] [address], no answer; left message to return the call to provide referral.    3:55p  Received a return call from [facility]. AT [place] at [address] [place] [address], referral provided; she will review and follow-up tomorrow morning regarding acceptance.     Sara Gatica, MSW, LSW [phone]

## 2023-01-17 NOTE — PLAN OF CARE
Problem: Safety - Medical Restraint  Goal: Remains free of injury from restraints (Restraint for Interference with Medical Device)  Description: INTERVENTIONS:  1. Determine that other, less restrictive measures have been tried or would not be effective before applying the restraint  2. Evaluate the patient's condition at the time of restraint application  3. Inform patient/family regarding the reason for restraint  4. Q2H: Monitor safety, psychosocial status, comfort, nutrition and hydration  Outcome: Not Progressing     Problem: Safety - Medical Restraint  Goal: Remains free of injury from restraints (Restraint for Interference with Medical Device)  Description: INTERVENTIONS:  1. Determine that other, less restrictive measures have been tried or would not be effective before applying the restraint  2. Evaluate the patient's condition at the time of restraint application  3. Inform patient/family regarding the reason for restraint  4.  Q2H: Monitor safety, psychosocial status, comfort, nutrition and hydration  Outcome: Not Progressing

## 2023-01-17 NOTE — PROGRESS NOTES
Haily Spears MD FACP  Internal medicine  Progress Notes      CHIEF COMPLAINT: Shortness of breath      HISTORY OF PRESENT ILLNESS:    1/15/2023  Patient was seen on the floor earlier this morning at the main campus of [facility]  He was in isolation room due to [place] test being positive  He was a poor historian  Registered nurse at bedside  59-year-old  man being evaluated for TAVR at [facility] for severe aortic stenosis  He claims that for the last 1 month he has not felt well with worsening shortness of breath  He does not know if he had fever or chills  Tested positive for COVID  Was in CHF  Responded very well to 1 dose of IV Lasix  Admitted for further management  1/16/2023  Patient was seen on the floor earlier this morning  Overnight combativeness reported  He was pulling IVs out and wanted to go home  This morning he is fully oriented and wants to go home  Not requiring any supplemental oxygen  No dyspnea or acute distress  1/17/2023  Patient was seen on the floor earlier this morning  Not doing well  Discharge was canceled yesterday  Currently in restraints due to combativeness and pulling on lines  On supplemental oxygen  Past Medical History:    Past Medical History:   Diagnosis Date    Asthma     MILD, no issues    BPH (benign prostatic hypertrophy)     CAD (coronary artery disease)     follows with Dr Ej Clifford. COPD (chronic obstructive pulmonary disease) ([facility]     Glaucoma     patient unsure    Hyperlipidemia     Hypertension     Preoperative clearance 10/10/2017    cardiac, Dr Ej Clifford. Retention of urine     Valvular heart disease     MV insufficiency, tricuspid regurgitation         Past Surgical History:    Past Surgical History:   Procedure Laterality Date    CATARACT REMOVAL  11/10 & 1/11    LEFT & RIGHT    COLONOSCOPY  11/7/2011    DIVERTICULOSIS    CORONARY ANGIOPLASTY  10/02/02    2 STENTS OF OM2 WITH GOOD RESULTS.     DIAGNOSTIC CARDIAC CATH LAB PROCEDURE 10/02/02    DONE SECONDARY TO RECURRENT CP & MILD ISCHEMIA OF ANTEROLATERAL WALL ON NUCLEAR, SHOWD 30-40% 2ND DIAGONAL STENOSIS.  20-30% MID LAD STENSOIS. 90% MID OM2 STENOSIS. PATENT RCA. NORMAL LV FUNCTION W/EF MORE THAN 55%. EYE SURGERY      HIP ARTHROPLASTY Right 11/07/2017    SKIN BIOPSY      TONSILLECTOMY      TUMOR EXCISION  4 YRS AGO    RIGHT EAR       Medications Prior to Admission:    Medications Prior to Admission: Rosuvastatin Calcium 20 MG CPSP, Take by mouth  acetaminophen (TYLENOL) 325 MG tablet, Take 2 tablets by mouth every 6 hours as needed for Pain  Multiple Vitamins-Minerals (OCUVITE) TABS oral tablet, Take 1 tablet by mouth daily  tamsulosin (FLOMAX) 0.4 MG capsule, Take 0.4 mg by mouth daily. Multiple Vitamins-Minerals (CENTRUM SILVER ADULT 50+) TABS, Take 1 tablet by mouth daily. finasteride (PROSCAR) 5 MG tablet, Take 5 mg by mouth daily. [DISCONTINUED] metoprolol (TOPROL-XL) 25 MG XL tablet, Take 12.5 mg by mouth nightly    Allergies:    Patient has no known allergies. Social History:    reports that he has quit smoking. His smoking use included cigarettes. He has a 30.00 pack-year smoking history. He has never used smokeless tobacco. He reports current alcohol use. He reports that he does not use drugs. Family History:   family history includes Cancer in his brother and sister; Hypertension in his sister. REVIEW OF SYSTEMS:  As above in the HPI, otherwise negative    PHYSICAL EXAM:    Vitals:  /84   Pulse 60   Temp 97.3 °F (36.3 °C) (Axillary)   Resp 18   Wt 145 lb (65.8 kg)   SpO2 94%   BMI 24.89 kg/m²     General:  Awake, alert, oriented X 3. Somewhat confused  And combative  HEENT:  Normocephalic, atraumatic. Pupils equal, round, reactive to light. No scleral icterus. No conjunctival injection. No nasal discharge. Neck:  Supple  Heart:  RRR, no murmurs, gallops, rubs  Lungs: Diffuse wheezes noted  Abdomen:   Bowel sounds present, soft, nontender, no masses, no organomegaly, no peritoneal signs  Extremities:  No clubbing, cyanosis, or edema  Skin:  Warm and dry, no open lesions or rash  Neuro:  Cranial nerves 2-12 intact, no focal deficits  Generalized muscle atrophy noted  Breast: deferred  Rectal: deferred  Genitalia:  deferred    LABS:  Data reviewed      ASSESSMENT:    Bronchospastic this morning  Principal Problem:    Acute respiratory failure with hypoxia (HCC)  COVID-positive test  Acute CHF/well compensated now  Severe aortic stenosis  Remote coronary artery disease      PLAN:  Check chest x-ray  Add DuoNeb  Continue steroids  Resume other home medications    Alf Easley MD  11:00 AM  1/17/2023

## 2023-01-17 NOTE — CARE COORDINATION
Received a call from patient's nephew/POA, Mariela Jung yesterday evening. Plan was for the patient to d/c to the [facility] today. Patient placed in bilateral wrist restraints overnight to maintain his safety. Call made to [place] [facility] and she reports they are unable to provide one on one care, has no beds on their memory care units at this time and the patient will need to be able to function independently. She requested SW update her, regarding the patient.     Andria Alfredo, MSW, LSW [phone]

## 2023-01-17 NOTE — CARE COORDINATION
Received a call from [address]brittney [address] at the [facility]. She states she spoke to patient's nephew, Bubba Ferreira and informed him patient is not appropriate for their facility at this time. She states he would like a referral made to [facility]. Call made to [facility], discussed patient being placed in restraints overnight; he confirmed he would like a referral made to [facility]. Call made to [facility], provided referral; she will review and follow up regarding acceptance. Ambulance form, 7000 and envelope will need completed.     Dipika Conrad, MSW, LSW [phone]

## 2023-01-17 NOTE — PROGRESS NOTES
INPATIENT CARDIOLOGY CONSULT    Name: Ricky Patel    Age: 89 y.o.    Date of Admission: 1/14/2023  5:57 PM    Date of Service: 1/17/2023    Reason for Consultation: CHF, AS    Referring Physician: Dee Dee Cassidy MD    Primary Cardiologist: aCs    Interim:  Visit done remotely due to ongoing treatment for COVID-19.    Apparently patient was combative and subsequently was placed on restraint.    It is reported  patient has close follow-up with [facility] and being considered for TAVR at the [facility].  Chart record shows patient will be discharged and once discharged and follow-up with the [facility] cardiac center for a plan for TAVR.    Past Medical History:  Past Medical History:   Diagnosis Date    Asthma     MILD, no issues    BPH (benign prostatic hypertrophy)     CAD (coronary artery disease)     follows with Dr Vega.    COPD (chronic obstructive pulmonary disease) (HCC)     Glaucoma     patient unsure    Hyperlipidemia     Hypertension     Preoperative clearance 10/10/2017    cardiac, Dr Vega.      Retention of urine     Valvular heart disease     MV insufficiency, tricuspid regurgitation         Past Surgical History:  Past Surgical History:   Procedure Laterality Date    CATARACT REMOVAL  11/10 & 1/11    LEFT & RIGHT    COLONOSCOPY  11/7/2011    DIVERTICULOSIS    CORONARY ANGIOPLASTY  10/02/02    2 STENTS OF OM2 WITH GOOD RESULTS.    DIAGNOSTIC CARDIAC CATH LAB PROCEDURE  10/02/02    DONE SECONDARY TO RECURRENT CP & MILD ISCHEMIA OF ANTEROLATERAL WALL ON NUCLEAR, SHOWD 30-40% 2ND DIAGONAL STENOSIS.  20-30% MID LAD STENSOIS.  90% MID OM2 STENOSIS.  PATENT RCA.  NORMAL LV FUNCTION W/EF MORE THAN 55%.    EYE SURGERY      HIP ARTHROPLASTY Right 11/07/2017    SKIN BIOPSY      TONSILLECTOMY      TUMOR EXCISION  4 YRS AGO    RIGHT EAR       Family History:  Family History   Problem Relation Age of Onset    Hypertension Sister     Cancer Brother     Cancer Sister        Social  History:  Social History     Tobacco Use    Smoking status: Former     Packs/day: 2.00     Years: 15.00     Pack years: 30.00     Types: Cigarettes    Smokeless tobacco: Never    Tobacco comments:     quit 45 years ago   Vaping Use    Vaping Use: Never used   Substance Use Topics    Alcohol use: Yes     Comment: Socially/ coffee daily     Drug use: No       Allergies:  No Known Allergies    Home Medications:  Prior to Admission medications    Medication Sig Start Date End Date Taking? Authorizing Provider   aspirin 81 MG EC tablet Take 1 tablet by mouth daily DVT prophylaxis following hip replacement x 30 days 1/16/23  Yes Alfreda Javed MD   metoprolol succinate (TOPROL XL) 25 MG extended release tablet Take 1 tablet by mouth daily 1/17/23  Yes Alfreda Javed MD   furosemide (LASIX) 20 MG tablet Take 1 tablet by mouth three times a week 1/16/23  Yes Alfreda Javed MD   Rosuvastatin Calcium 20 MG CPSP Take by mouth   Yes Historical Provider, MD   acetaminophen (TYLENOL) 325 MG tablet Take 2 tablets by mouth every 6 hours as needed for Pain 11/9/17 11/30/22  Africa Fernández MD   Multiple Vitamins-Minerals (OCUVITE) TABS oral tablet Take 1 tablet by mouth daily    Historical Provider, MD   tamsulosin (FLOMAX) 0.4 MG capsule Take 0.4 mg by mouth daily. 12/15/14   Historical Provider, MD   Multiple Vitamins-Minerals (CENTRUM SILVER ADULT 50+) TABS Take 1 tablet by mouth daily. Historical Provider, MD   finasteride (PROSCAR) 5 MG tablet Take 5 mg by mouth daily.       Historical Provider, MD       Current Medications:    Current Facility-Administered Medications:     ipratropium-albuterol (DUONEB) nebulizer solution 1 ampule, 1 ampule, Inhalation, 4x daily, Alfreda Javed MD    OLANZapine THE PAVILIION) injection 5 mg, 5 mg, IntraMUSCular, Once, Alfreda Javed MD    haloperidol lactate (HALDOL) injection 2 mg, 2 mg, IntraMUSCular, Q6H PRN, Alfreda Javed MD, 2 mg at 01/17/23 1510    acetaminophen (TYLENOL) tablet 650 mg, 650 mg, Oral, Q6H PRN, Prerna Gonzalez MD    aspirin EC tablet 81 mg, 81 mg, Oral, Daily, Prerna Gonzalez MD, 81 mg at 01/17/23 1052    finasteride (PROSCAR) tablet 5 mg, 5 mg, Oral, Daily, Prerna Gonzalez MD, 5 mg at 01/17/23 1051    ocuvite-lutein multivitamin 1 capsule, 1 capsule, Oral, Daily, Prerna Gonzalez MD, 1 capsule at 01/17/23 1051    enoxaparin (LOVENOX) injection 40 mg, 40 mg, SubCUTAneous, Daily, Prerna Gonzalez MD, 40 mg at 01/17/23 1051    melatonin tablet 3 mg, 3 mg, Oral, Nightly PRN, Prenra Gonzalez MD    potassium chloride (KLOR-CON M) extended release tablet 20 mEq, 20 mEq, Oral, Daily with breakfast, Prerna Gonzalez MD, 20 mEq at 01/17/23 1052    atorvastatin (LIPITOR) tablet 40 mg, 40 mg, Oral, Nightly, Jose Thacker MD, 40 mg at 01/16/23 2003    tamsulosin (FLOMAX) capsule 0.4 mg, 0.4 mg, Oral, QHS, Jose Thacker MD, 0.4 mg at 01/16/23 2040    metoprolol succinate (TOPROL XL) extended release tablet 25 mg, 25 mg, Oral, Daily, Jose Thacker MD, 25 mg at 01/17/23 1054    dexamethasone (DECADRON) injection 6 mg, 6 mg, IntraVENous, Q24H, Prerna Gonzalez MD, 6 mg at 01/15/23 2058    Physical Exam:  /84   Pulse 60   Temp 97.3 °F (36.3 °C) (Axillary)   Resp 18   Ht 5' 4\" (1.626 m)   Wt 145 lb (65.8 kg)   SpO2 94%   BMI 24.89 kg/m²   Wt Readings from Last 3 Encounters:   01/14/23 145 lb (65.8 kg)   12/15/22 145 lb (65.8 kg)   12/07/22 141 lb (64 kg)     Appearance: Elderly male, awake, alert, appears dyspneic, on nasal cannula  Skin: Intact, no rash  Head: Normocephalic, atraumatic  Eyes: EOMI, no conjunctival erythema  ENMT: No pharyngeal erythema, MMM, no rhinorrhea  Neck: Supple, mildly elevated JVP, no carotid bruits  Lungs: Diminished, few basilar rales  Cardiac: PMI nondisplaced, regular rhythm with a normal rate, frequent ectopy, normal S1 & S2, systolic murmur difficult to characterize given heavy breathing and inability to hold his breath for more than a second  Abdomen: Soft, nontender, +bowel sounds  Extremities: Moves all extremities x 4, 1+ dependent lower extremity edema  Neurologic: No focal motor deficits apparent, normal mood and affect  Peripheral Pulses: Intact posterior tibial pulses bilaterally    Intake/Output:    Intake/Output Summary (Last 24 hours) at 2023 1644  Last data filed at 2023 1453  Gross per 24 hour   Intake 370 ml   Output --   Net 370 ml     I/O this shift:  In: 120 [P.O.:120]  Out: -     Laboratory Tests:  Recent Labs     01/14/23  1830 01/15/23  0546 232    144 135   K 3.6 3.8 5.0   * 108* 101   CO2 23 20* 18*   BUN 20 20 37*   CREATININE 0.9 0.9 0.9   GLUCOSE 79 122* 75   CALCIUM 8.9 8.8 9.1     Lab Results   Component Value Date/Time    MG 2.2 01/15/2023 05:46 AM     Recent Labs     01/14/23  1830 01/15/23  0546 23   ALKPHOS 82 74 67   ALT 18 15 22   AST 28 23 51*   PROT 6.9 6.4 5.9*   BILITOT 0.8 0.9 0.9   LABALBU 3.9 3.6 3.4*     Recent Labs     23   WBC 8.5   RBC 5.35   HGB 16.9*   HCT 51.4   MCV 96.1   MCH 31.6   MCHC 32.9   RDW 16.8*      MPV 10.3     Lab Results   Component Value Date    TROPONINI <0.01 2016     No results found for: INR, PROTIME  Lab Results   Component Value Date    TSH 0.939 2016     No results found for: LABA1C  No results found for: EAG  No results found for: CHOL  No results found for: TRIG  No results found for: HDL  No results found for: LDLCALC, LDLCHOLESTEROL  No results found for: LABVLDL, VLDL  No results found for: [facility]  Recent Labs     23  183   PROBNP 14,442*       Cardiac Tests:  EK2023: Sinus rhythm with PACs and PVCs. RBBB QRS duration 130 ms    Telemetry: Sinus rhythm 70s with PACs and frequent PVCs and couplets    Chest X-ray:   23       Impression   No acute process. CTA chest  Personally reviewed. Moderate seems like an overcall on these effusions    Impression   No evidence of pulmonary embolism. Mild interlobular septal thickening, which could suggest slight pulmonary   edema. Moderate bilateral pleural effusions. Clinical and laboratory   correlation recommended. Echocardiogram:   TTE 12/2022 [facility]   Summary   Top normal left ventricle size. Ejection fraction is visually estimated at 45%. Mild to moderate centrally directed mitral insufficiency   Severe aortic stenosis. Mild aortic regurgitation. Normal tricuspid valve structure and function. RVSP is 45 mmHg. Pulmonary hypertension is mild . Stress test:    Pharm stress 12/7/22  Gated SPECT left ventricular ejection fraction was calculated to be 36%, with global hypokinesis in the absence of regional wall motion abnormalities. Impression:    Electrocardiographically normal regadenoson infusion with a clinically nonischemic response. Myocardial perfusion imaging was normal.    Overall left ventricular systolic function was  mild to moderately impaired with global hypokinesis and no regional wall motion abnormalities with a dilated left ventricular chamber . 4. Intermediate risk pharmacologic stress test    Cardiac catheterization:   10/2002 2.75 x12, 2.75 x 8 [facility] -  OM2 on 10/2/2002 - Dr Nadira Childress    -------------------------------------------------------------------------------------------------------------------------------------------------------------  IMPRESSION:  Acute on chronic heart failure with reduced ejection fraction. proBNP 14,000  Minimally elevated hs-cTnT: 50-44-48 ng/L flat pattern likely chronic myocardial injury. No evidence of ACS  Frequent PVCs  Cardiomyopathy EF 40-45% by echo, 36% by SPECT.   Mixed ischemic, valvular  Severe aortic stenosis with mild AR, mild to moderate MR, mild TR  Coronary artery disease PCI to OM 2002  RBBB  COVID-19 pneumonia  Acute hypoxic respiratory failure  Bilateral pleural effusions  Hypertension  COPD  Hyperlipidemia    RECOMMENDATIONS:  Apparently patient has close follow-up with [facility] and being considered for TAVR at the [facility]. Chart record shows patient will be discharged and once discharged and follow-up with the [facility] cardiac center for a plan for TAVR. Follow-up with your cardiologist in the outpatient with [facility] for an ischemic and TAVR evaluation. Continue on cardiac medications as blood pressure allows   Avoid hypotension given severe aortic stenosis and discussed Entresto and guideline directed medical therapy with [facility] especially after TAVR for optimal guideline directed medical therapy. Thank you for allowing me to participate in your patient's care. Please feel free to contact me if you have any questions or concerns.     Bernice Pierre MD  [facility]) Cardiology

## 2023-01-17 NOTE — PROGRESS NOTES
Comprehensive Nutrition Assessment    Type and Reason for Visit:  Initial, Positive Nutrition Screen    Nutrition Recommendations/Plan:   Recommend and start Ensure Plus supplement daily and Magic Cup supplement daily to help meet nutritional needs. Malnutrition Assessment:  Malnutrition Status: At risk for malnutrition (Comment) (01/17/23 1131)    Context:  Acute Illness     Findings of the 6 clinical characteristics of malnutrition:  Energy Intake:  Mild decrease in energy intake (Comment) (since admission)  Weight Loss:  Unable to assess (d/t no actual weights available since admission)     Body Fat Loss:  Unable to assess (d/t COVID isolation)     Muscle Mass Loss:  Unable to assess (d/t COVID isolation)    Fluid Accumulation:  No significant fluid accumulation     Strength:  Not Performed    Nutrition Assessment:    Patient adm w/ SOB and acute respiratory failure with hypoxia ; pt also COVID-19 positive ; noted pleural effusion ; hx of COPD/CAD/CHF/cardiomyopathy ; wounds noted ; possible TAVR at [facility] clinic for severe aortic stenosis ; will start ONS    Nutrition Related Findings:    -I&Os (-2.4 L), no edema, A&O x 4, active BS, rounded abd ; Wound Type: Multiple, Open Wounds, Wound Consult Pending (wounds x 3)       Current Nutrition Intake & Therapies:    Average Meal Intake: 26-50%     ADULT DIET; Regular; Low Fat/Low Chol/High Fiber/2 gm Na    Anthropometric Measures:  Height: 5' 4\" (162.6 cm)  Ideal Body Weight (IBW): 130 lbs (59 kg)       Current Body Weight: 145 lb (65.8 kg) (1/14, stated), 111.5 % IBW.     Current BMI (kg/m2): 24.9  Usual Body Weight:  (EMR shows past weights of 141# actual on 11/30/22 and 145# actual on 10/2/19)                       BMI Categories: Normal Weight (BMI 22.0 to 24.9) age over 72    Estimated Daily Nutrient Needs:  Energy Requirements Based On: Formula  Weight Used for Energy Requirements: Current  Energy (kcal/day): 8904-1674 (REE 1220 x 1.2 SF)  Weight Used for Protein Requirements: Current  Protein (g/day): 80-90 (1.2-1.4g/kg CBW)  Method Used for Fluid Requirements: 1 ml/kcal  Fluid (ml/day): 6541-6766    Nutrition Diagnosis:   Inadequate oral intake related to inadequate protein-energy intake (2/2 COVID-19) as evidenced by poor intake prior to admission, intake 26-50%    Nutrition Interventions:   Food and/or Nutrient Delivery: Continue Current Diet, Start Oral Nutrition Supplement  Nutrition Education/Counseling: Education not indicated  Coordination of Nutrition Care: Continue to monitor while inpatient       Goals:  Previous Goal Met: Progressing toward Goal(s)  Goals: PO intake 50% or greater, by next RD assessment       Nutrition Monitoring and Evaluation:   Behavioral-Environmental Outcomes: None Identified  Food/Nutrient Intake Outcomes: Food and Nutrient Intake, Supplement Intake  Physical Signs/Symptoms Outcomes: Biochemical Data, Chewing or Swallowing, GI Status, Fluid Status or Edema, Hemodynamic Status, Meal Time Behavior, Nutrition Focused Physical Findings, Skin, Weight    Discharge Planning:     Too soon to determine     Sridevi Marie RD, LD  Contact: [phone]

## 2023-01-18 LAB
C-REACTIVE PROTEIN: 4.9 MG/DL (ref 0–0.4)
REASON FOR REJECTION: NORMAL
REJECTED TEST: NORMAL

## 2023-01-18 PROCEDURE — 2500000003 HC RX 250 WO HCPCS: Performed by: INTERNAL MEDICINE

## 2023-01-18 PROCEDURE — 2580000003 HC RX 258: Performed by: INTERNAL MEDICINE

## 2023-01-18 PROCEDURE — 2700000000 HC OXYGEN THERAPY PER DAY

## 2023-01-18 PROCEDURE — 6370000000 HC RX 637 (ALT 250 FOR IP): Performed by: INTERNAL MEDICINE

## 2023-01-18 PROCEDURE — 6360000002 HC RX W HCPCS: Performed by: INTERNAL MEDICINE

## 2023-01-18 PROCEDURE — 99222 1ST HOSP IP/OBS MODERATE 55: CPT | Performed by: INTERNAL MEDICINE

## 2023-01-18 PROCEDURE — 86140 C-REACTIVE PROTEIN: CPT

## 2023-01-18 PROCEDURE — 94640 AIRWAY INHALATION TREATMENT: CPT

## 2023-01-18 PROCEDURE — 99233 SBSQ HOSP IP/OBS HIGH 50: CPT | Performed by: INTERNAL MEDICINE

## 2023-01-18 PROCEDURE — 36415 COLL VENOUS BLD VENIPUNCTURE: CPT

## 2023-01-18 PROCEDURE — 2140000000 HC CCU INTERMEDIATE R&B

## 2023-01-18 RX ORDER — OLANZAPINE 10 MG/1
5 INJECTION, POWDER, LYOPHILIZED, FOR SOLUTION INTRAMUSCULAR ONCE
Status: COMPLETED | OUTPATIENT
Start: 2023-01-18 | End: 2023-01-18

## 2023-01-18 RX ADMIN — HALOPERIDOL LACTATE 2 MG: 5 INJECTION, SOLUTION INTRAMUSCULAR at 11:38

## 2023-01-18 RX ADMIN — HALOPERIDOL LACTATE 2 MG: 5 INJECTION, SOLUTION INTRAMUSCULAR at 18:41

## 2023-01-18 RX ADMIN — OLANZAPINE 5 MG: 10 INJECTION, POWDER, LYOPHILIZED, FOR SOLUTION INTRAMUSCULAR at 21:33

## 2023-01-18 RX ADMIN — HALOPERIDOL LACTATE 2 MG: 5 INJECTION, SOLUTION INTRAMUSCULAR at 04:05

## 2023-01-18 RX ADMIN — PIPERACILLIN AND TAZOBACTAM 4500 MG: 4; .5 INJECTION, POWDER, LYOPHILIZED, FOR SOLUTION INTRAVENOUS at 16:06

## 2023-01-18 RX ADMIN — IPRATROPIUM BROMIDE AND ALBUTEROL SULFATE 1 AMPULE: 2.5; .5 SOLUTION RESPIRATORY (INHALATION) at 09:20

## 2023-01-18 ASSESSMENT — PAIN SCALES - GENERAL: PAINLEVEL_OUTOF10: 0

## 2023-01-18 NOTE — PROGRESS NOTES
Miky Maciel MD FACP  Internal medicine  Progress Notes      CHIEF COMPLAINT: Shortness of breath      HISTORY OF PRESENT ILLNESS:    1/15/2023  Patient was seen on the floor earlier this morning at the main campus of [facility]  He was in isolation room due to [place] test being positive  He was a poor historian  Registered nurse at bedside  14-year-old  man being evaluated for TAVR at [facility] for severe aortic stenosis  He claims that for the last 1 month he has not felt well with worsening shortness of breath  He does not know if he had fever or chills  Tested positive for COVID  Was in CHF  Responded very well to 1 dose of IV Lasix  Admitted for further management  1/16/2023  Patient was seen on the floor earlier this morning  Overnight combativeness reported  He was pulling IVs out and wanted to go home  This morning he is fully oriented and wants to go home  Not requiring any supplemental oxygen  No dyspnea or acute distress  1/17/2023  Patient was seen on the floor earlier this morning  Not doing well  Discharge was canceled yesterday  Currently in restraints due to combativeness and pulling on lines  On supplemental oxygen  1/18/2023  Patient was seen on the floor earlier this morning  He was in isolation room due to COVID-positive test  Requiring restraints due to agitation  On supplemental oxygen  Chest x-ray showing infiltrates  Past Medical History:    Past Medical History:   Diagnosis Date    Asthma     MILD, no issues    BPH (benign prostatic hypertrophy)     CAD (coronary artery disease)     follows with Dr Edward Francisco. COPD (chronic obstructive pulmonary disease) ([facility] [address]     Glaucoma     patient unsure    Hyperlipidemia     Hypertension     Preoperative clearance 10/10/2017    cardiac, Dr Edward Francisco.       Retention of urine     Valvular heart disease     MV insufficiency, tricuspid regurgitation         Past Surgical History:    Past Surgical History:   Procedure Laterality Date CATARACT REMOVAL  11/10 & 1/11    LEFT & RIGHT    COLONOSCOPY  11/7/2011    DIVERTICULOSIS    CORONARY ANGIOPLASTY  10/02/02    2 STENTS OF OM2 WITH GOOD RESULTS. DIAGNOSTIC CARDIAC CATH LAB PROCEDURE  10/02/02    DONE SECONDARY TO RECURRENT CP & MILD ISCHEMIA OF ANTEROLATERAL WALL ON NUCLEAR, SHOWD 30-40% 2ND DIAGONAL STENOSIS.  20-30% MID LAD STENSOIS. 90% MID OM2 STENOSIS. PATENT RCA. NORMAL LV FUNCTION W/EF MORE THAN 55%. EYE SURGERY      HIP ARTHROPLASTY Right 11/07/2017    SKIN BIOPSY      TONSILLECTOMY      TUMOR EXCISION  4 YRS AGO    RIGHT EAR       Medications Prior to Admission:    Medications Prior to Admission: Rosuvastatin Calcium 20 MG CPSP, Take by mouth  acetaminophen (TYLENOL) 325 MG tablet, Take 2 tablets by mouth every 6 hours as needed for Pain  Multiple Vitamins-Minerals (OCUVITE) TABS oral tablet, Take 1 tablet by mouth daily  tamsulosin (FLOMAX) 0.4 MG capsule, Take 0.4 mg by mouth daily. Multiple Vitamins-Minerals (CENTRUM SILVER ADULT 50+) TABS, Take 1 tablet by mouth daily. finasteride (PROSCAR) 5 MG tablet, Take 5 mg by mouth daily. [DISCONTINUED] metoprolol (TOPROL-XL) 25 MG XL tablet, Take 12.5 mg by mouth nightly    Allergies:    Patient has no known allergies. Social History:    reports that he has quit smoking. His smoking use included cigarettes. He has a 30.00 pack-year smoking history. He has never used smokeless tobacco. He reports current alcohol use. He reports that he does not use drugs. Family History:   family history includes Cancer in his brother and sister; Hypertension in his sister. REVIEW OF SYSTEMS:  As above in the HPI, otherwise negative    PHYSICAL EXAM:    Vitals:  BP (!) 148/77   Pulse 76   Temp 97.9 °F (36.6 °C) (Temporal)   Resp 22   Ht 5' 4\" (1.626 m)   Wt 145 lb (65.8 kg)   SpO2 97%   BMI 24.89 kg/m²     General:  Awake, alert, oriented X 3. Somewhat confused  And combative  HEENT:  Normocephalic, atraumatic.   Pupils equal, round, reactive to light.  No scleral icterus.  No conjunctival injection.  No nasal discharge.  Neck:  Supple  Heart:  RRR, no murmurs, gallops, rubs  Lungs: Diffuse wheezes noted  Abdomen:  Bowel sounds present, soft, nontender, no masses, no organomegaly, no peritoneal signs  Extremities:  No clubbing, cyanosis, or edema  Skin:  Warm and dry, no open lesions or rash  Neuro:  Cranial nerves 2-12 intact, no focal deficits  Generalized muscle atrophy noted  Breast: deferred  Rectal: deferred  Genitalia:  deferred    LABS:  Data reviewed      ASSESSMENT:    Bronchospastic this morning  Suspected aspiration  Principal Problem:    Acute respiratory failure with hypoxia (HCC)  COVID-positive test  Acute CHF/well compensated now  Severe aortic stenosis  Remote coronary artery disease      PLAN:  Add Zosyn   add DuoNeb  Continue steroids  Consult pulmonary  Resume other home medications    Dee Dee Cassidy MD  2:38 PM  1/18/2023

## 2023-01-18 NOTE — CONSULTS
[facility]  Department of Internal Medicine  Division of Pulmonary, Critical Care and Sleep Medicine  Consult Note    Greta Anne DO, MPH, Tisha Villa, [address] [facility] Ellen TALBERT MD      Patient: Batsheva Camacho  MRN: [id number]  : 3/20/1930    Encounter Time: 8:12 PM     Date of Admission: 2023  5:57 PM    Primary Care Physician: Brandi Ngo MD    Reason for Consultation: Pneumonia     HISTORY OF PRESENT ILLNESS : Batsheva Camacho 80 y.o. male was seen in consultation regarding the above chief compliant. Patient presented to the emergency room regarding shortness of breath. He did have a positive COVID test.  Unfortunately he is a poor historian. At time of my examination he is quite drowsy and is not oriented. He has apparently been quite agitated intermittently. Unfortunately I am unsure of his baseline mentation. PAST MEDICAL HISTORY:  has a past medical history of Asthma, BPH (benign prostatic hypertrophy), CAD (coronary artery disease), COPD (chronic obstructive pulmonary disease) ([address] Glaucoma, Hyperlipidemia, Hypertension, Preoperative clearance, Retention of urine, and Valvular heart disease. SURGICAL HISTORY:  has a past surgical history that includes tumor excision (4 YRS AGO); Colonoscopy (2011); skin biopsy; Tonsillectomy; Diagnostic Cardiac Cath Lab Procedure (10/02/02); Coronary angioplasty (10/02/02); Cataract removal (11/10 & ); eye surgery; and Hip Arthroplasty (Right, 2017). SOCIAL HISTORY:  reports that he has quit smoking. His smoking use included cigarettes. He has a 30.00 pack-year smoking history. He has never used smokeless tobacco. He reports current alcohol use. He reports that he does not use drugs. FAMILY  HISTORY: family history includes Cancer in his brother and sister; Hypertension in his sister.      MEDICATIONS:    Prior to Admission medications    Medication Sig Start Date End Date Taking? Authorizing Provider   aspirin 81 MG EC tablet Take 1 tablet by mouth daily DVT prophylaxis following hip replacement x 30 days 1/16/23  Yes James Lew MD   metoprolol succinate (TOPROL XL) 25 MG extended release tablet Take 1 tablet by mouth daily 1/17/23  Yes James Lew MD   furosemide (LASIX) 20 MG tablet Take 1 tablet by mouth three times a week 1/16/23  Yes James Lew MD   Rosuvastatin Calcium 20 MG CPSP Take by mouth   Yes Historical Provider, MD   acetaminophen (TYLENOL) 325 MG tablet Take 2 tablets by mouth every 6 hours as needed for Pain 11/9/17 11/30/22  Shaina Garza MD   Multiple Vitamins-Minerals (OCUVITE) TABS oral tablet Take 1 tablet by mouth daily    Historical Provider, MD   tamsulosin (FLOMAX) 0.4 MG capsule Take 0.4 mg by mouth daily. 12/15/14   Historical Provider, MD   Multiple Vitamins-Minerals (CENTRUM SILVER ADULT 50+) TABS Take 1 tablet by mouth daily. Historical Provider, MD   finasteride (PROSCAR) 5 MG tablet Take 5 mg by mouth daily. Historical Provider, MD       ALLERGIES: Patient has no known allergies.        REVIEW OF SYSTEMS:  Otherwise negative if not reported or listed below  Unable to obtain due to mentation    OBJECTIVE:     PHYSICAL EXAM:   VITALS:   Vitals:    01/18/23 0921 01/18/23 1057 01/18/23 1602 01/18/23 1945   BP:  (!) 148/77 (!) 126/96 (!) 126/94   Pulse:  76 90 87   Resp:  22 24 20   Temp:  97.9 °F (36.6 °C) 97.9 °F (36.6 °C) 97.5 °F (36.4 °C)   TempSrc:  Temporal Axillary Axillary   SpO2: 97% 97% 95% 95%   Weight:       Height:            Intake/Output Summary (Last 24 hours) at 1/18/2023 2012  Last data filed at 1/18/2023 8192  Gross per 24 hour   Intake 0 ml   Output --   Net 0 ml        CONSTITUTIONAL:   Ill-appearing, oriented to person only  SKIN:     No rash, No suspicious lesions, No skin discoloration  HEENT:     EOMI, MMM, No thrush  NECK:    No bruits, No JVP appreciated  CV:      2 out of 6 systolic murmur, regular. PULMONARY:   Few crackles in bases. Otherwise clear  ABDOMEN:     Soft, non-tender. BS normal. No R/R/G  EXT:    No deformities . No clubbing. No lower extremity edema, No venous stasis  PULSE:   Appears equal and palpable.   PSYCHIATRIC:  Seems appropriate, No acute psychosis  MS:    No fractures, No gross weakness  NEUROLOGIC:   The clinical assessment is non-focal     DATA: IMAGING & TESTING:     LABORATORY TESTS:    CBC with Differential:    Lab Results   Component Value Date/Time    WBC 8.5 01/14/2023 06:30 PM    RBC 5.35 01/14/2023 06:30 PM    HGB 16.9 01/14/2023 06:30 PM    HCT 51.4 01/14/2023 06:30 PM     01/14/2023 06:30 PM    MCV 96.1 01/14/2023 06:30 PM    MCH 31.6 01/14/2023 06:30 PM    MCHC 32.9 01/14/2023 06:30 PM    RDW 16.8 01/14/2023 06:30 PM    BANDSPCT 3 08/29/2016 07:14 PM    METASPCT 1 08/29/2016 07:14 PM    LYMPHOPCT 16.6 01/14/2023 06:30 PM    MONOPCT 9.5 01/14/2023 06:30 PM    MYELOPCT 2 08/29/2016 07:14 PM    BASOPCT 0.5 01/14/2023 06:30 PM    MONOSABS 0.81 01/14/2023 06:30 PM    LYMPHSABS 1.42 01/14/2023 06:30 PM    EOSABS 0.07 01/14/2023 06:30 PM    BASOSABS 0.04 01/14/2023 06:30 PM     CMP:    Lab Results   Component Value Date/Time     01/17/2023 04:32 AM    K 5.0 01/17/2023 04:32 AM    K 3.6 01/14/2023 06:30 PM     01/17/2023 04:32 AM    CO2 18 01/17/2023 04:32 AM    BUN 37 01/17/2023 04:32 AM    CREATININE 0.9 01/17/2023 04:32 AM    GFRAA >60 06/10/2022 05:03 PM    LABGLOM >60 01/17/2023 04:32 AM    GLUCOSE 75 01/17/2023 04:32 AM    PROT 5.9 01/17/2023 04:32 AM    LABALBU 3.4 01/17/2023 04:32 AM    CALCIUM 9.1 01/17/2023 04:32 AM    BILITOT 0.9 01/17/2023 04:32 AM    ALKPHOS 67 01/17/2023 04:32 AM    AST 51 01/17/2023 04:32 AM    ALT 22 01/17/2023 04:32 AM        PRO-BNP:   Lab Results   Component Value Date    PROBNP 14,442 (H) 01/14/2023    PROBNP 192 08/29/2016      ABGs: No results found for: PH, PO2, PCO2  Hemoglobin A1C: No components found for: HGBA1C    IMAGING:  Imaging tests were completed and reviewed and discussed radiology and care team involved and reveals   XR CHEST PORTABLE    Result Date: 1/14/2023  EXAMINATION: ONE XRAY VIEW OF THE CHEST 1/14/2023 7:35 pm COMPARISON: 06/10/2022 HISTORY: ORDERING SYSTEM PROVIDED HISTORY: short of breath TECHNOLOGIST PROVIDED HISTORY: Reason for exam:->short of breath What reading provider will be dictating this exam?->CRC FINDINGS: The lungs are without acute focal process. There is no effusion or pneumothorax. Cardiomegaly. The osseous structures are without acute process. No acute process. XR CHEST 1 VIEW    Result Date: 1/17/2023  EXAMINATION: ONE XRAY VIEW OF THE CHEST 1/17/2023 11:52 am COMPARISON: 01/14/2023 chest radiograph and CTA chest. HISTORY: ORDERING SYSTEM PROVIDED HISTORY: hypoxia TECHNOLOGIST PROVIDED HISTORY: Reason for exam:->hypoxia What reading provider will be dictating this exam?->CRC FINDINGS: There are interstitial and patchy airspace densities throughout the lungs. There is probable blunting of right costophrenic sulcus. Cardiac silhouette is enlarged. Prominent right paratracheal soft tissues is probably due to rotation of the current study. There is atherosclerotic calcification of the thoracic aorta. There is moderate thoracic spondylosis. There are multiple remote left lateral rib fractures. No pneumothorax. Bilateral lung infiltrates consistent with pulmonary edema and or pneumonia. Probable small right pleural effusion. Stable enlargement of the cardiac silhouette. CTA PULMONARY W CONTRAST    Result Date: 1/14/2023  EXAMINATION: CTA OF THE CHEST 1/14/2023 10:17 pm TECHNIQUE: CTA of the chest was performed after the administration of intravenous contrast.  Multiplanar reformatted images are provided for review. MIP images are provided for review.  Automated exposure control, iterative reconstruction, and/or weight based adjustment of the mA/kV was utilized to reduce the radiation dose to as low as reasonably achievable. COMPARISON: None. HISTORY: ORDERING SYSTEM PROVIDED HISTORY: r/o pe TECHNOLOGIST PROVIDED HISTORY: Reason for exam:->r/o pe Decision Support Exception - unselect if not a suspected or confirmed emergency medical condition->Emergency Medical Condition (MA) What reading provider will be dictating this exam?->CRC FINDINGS: Pulmonary Arteries: Pulmonary arteries are adequately opacified for evaluation. No evidence of intraluminal filling defect to suggest pulmonary embolism. Main pulmonary artery is normal in caliber. Mediastinum: No evidence of mediastinal lymphadenopathy. The heart and pericardium demonstrate no acute abnormality. There is no acute abnormality of the thoracic aorta. Lungs/pleura: The lungs are without acute process. There is mild bilateral peribronchial thickening. No focal consolidation. There is mild interlobular septal thickening. No evidence of pneumothorax. Moderate bilateral pleural effusions. Upper Abdomen: Limited images of the upper abdomen are unremarkable. Soft Tissues/Bones: No acute bone or soft tissue abnormality. No evidence of pulmonary embolism. Mild interlobular septal thickening, which could suggest slight pulmonary edema. Moderate bilateral pleural effusions. Clinical and laboratory correlation recommended. Assessment:   Acute hypoxemic respiratory failure  COVID-19 positive  Delirium  Elevated proBNP        Plan:   Wean FiO2 as tolerated  Continue Decadron  CRP and LDH pending. Con consider possible tocilizumab  4.   Breathing treatments ordered however patient is not currently able to cooperate with these  Delirium precautions  Recommend avoiding all sedating medications including pain medication and Haldol  Appreciate cardiology recommendations        Jase Hadley, DO   Pulmonary, Critical Care and Sleep Medicine

## 2023-01-18 NOTE — PROGRESS NOTES
Subjective     Patient ID: Marisabel is a 26 month old female.    Referring Provider: Fahad Trent MD  PCP: Fahad Trent MD    Marisabel is accompanied by Mother     If person accompanying child is not their legal guardian, then is their name listed on Authorization for Treatment of Minor by Delegated Persons? NA    Chief Complaint   Patient presents with   • Left Arm - Follow-up         Visit Type: FRACTURE CARE    Chief Complaint: Right wrist fracture.    Date of Injury: November 2, 2019.    History: Patient is here today for follow-up.  She has been using a removable splint since last visit.  No complaints.    ROS: pt notes no new musculoskeletal, neurologic or vascular conditions    Exam:  pt is well developed and has normal affect    RIGHT WRIST: Normal appearance. No tenderness. Full AROM. Full power in all movements. No effusion.No laxity. No crepitation. Skin,sensation,circulation normal. No pain w/ resisted wrist movements.                                                         Impression/ diagnosis: Making good progress after splint treatment of right distal radius metaphyseal torus fracture.    Discussion / plan: Okay to discontinue splint.  Return to activities as tolerated.  Follow-up as needed.         When obtained report from previous nurse, no reported issues. Patient remained restraint free all night.

## 2023-01-18 NOTE — CARE COORDINATION
Received a message from Dorian Simon at [facility] and they are unable to accept patient due to behaviors and no dementia bed available. Spoke with Mendoza Mcclure and he is requesting a referral be made to [facility] in the [place] area. Spoke with Mikel Paul in admissions, she reports they have beds available and are able to accept covid patients; referral faxed. 1:47p  Received a return call from Mikel Paul at [facility]. They are able to accept the patient and will initiate pre-cert today; bed will not be available until tomorrow evening. Ambulance transport set up for 5:30p tomorrow, 1/19/23 via [facility]; pending Vazquez Abhinav is obtained. Ambulance form completed, 7000 started and envelope placed on the soft chart. The Plan for Transition of Care is related to the following treatment goals: discharge planning    The Patient and/or patient representative Pebbles Hartman nephew/POA was provided with a choice of provider and agrees   with the discharge plan. [x] Yes [] No    Freedom of choice list was provided with basic dialogue that supports the patient's individualized plan of care/goals, treatment preferences and shares the quality data associated with the providers.  [x] Yes [] No     Troy Brooks, JOHN, LSW [phone]

## 2023-01-18 NOTE — PROGRESS NOTES
Attempted to do breathing treatment with patient. The patient is unable to properly use the resirgard nebulizer. This nebulizer must be used due to the patient being Covid+. I held the treatment for the patient and he would not place the mouthpiece in his mouth/ make a seal. The patient shook his head to avoid the nebulizer. Patient unable to do breathing treatments this route at this time. Will reassess later.

## 2023-01-18 NOTE — PROGRESS NOTES
Extended Infusion Policy     This patient is on medication that requires renal, weight, and/or indication dose adjustment. Date Body Weight IBW  Adjusted BW SCr  CrCl Dialysis status BMI   1/18/2023 145 lb (65.8 kg) Ideal body weight: 59.2 kg (130 lb 8.2 oz)  Adjusted ideal body weight: 61.8 kg (136 lb 4.9 oz) Serum creatinine: 0.9 mg/dL 01/17/23 0432  Estimated creatinine clearance: 44 mL/min N/a Body mass index is 24.89 kg/m². Pharmacy has dose-adjusted the following medication(s):    Ordered Medication: Zosyn 2250mg q8h      Order Changed/converted to: Zosyn 4500mg q8h    These changes were made per protocol according to the [facility]   [employer] Extended Infusion Dose Adjustment Policy. *Please note this dose may need readjusted if patient's condition changes. Please contact pharmacy with any questions regarding these changes.     ZAKIYA Hampton [facility]  1/18/2023  2:39 PM

## 2023-01-19 ENCOUNTER — APPOINTMENT (OUTPATIENT)
Dept: CT IMAGING | Age: 88
DRG: 177 | End: 2023-01-19
Payer: MEDICARE

## 2023-01-19 LAB
ALBUMIN SERPL-MCNC: 3.4 G/DL (ref 3.5–5.2)
ALBUMIN SERPL-MCNC: 3.4 G/DL (ref 3.5–5.2)
ALP BLD-CCNC: 75 U/L (ref 40–129)
ALP BLD-CCNC: 78 U/L (ref 40–129)
ALT SERPL-CCNC: 33 U/L (ref 0–40)
ALT SERPL-CCNC: 34 U/L (ref 0–40)
ANION GAP SERPL CALCULATED.3IONS-SCNC: 13 MMOL/L (ref 7–16)
ANION GAP SERPL CALCULATED.3IONS-SCNC: 14 MMOL/L (ref 7–16)
AST SERPL-CCNC: 51 U/L (ref 0–39)
AST SERPL-CCNC: 54 U/L (ref 0–39)
B.E.: -0.6 MMOL/L (ref -3–3)
BASOPHILS ABSOLUTE: 0.01 E9/L (ref 0–0.2)
BASOPHILS ABSOLUTE: 0.01 E9/L (ref 0–0.2)
BASOPHILS RELATIVE PERCENT: 0.1 % (ref 0–2)
BASOPHILS RELATIVE PERCENT: 0.1 % (ref 0–2)
BILIRUB SERPL-MCNC: 1.2 MG/DL (ref 0–1.2)
BILIRUB SERPL-MCNC: 1.2 MG/DL (ref 0–1.2)
BUN BLDV-MCNC: 29 MG/DL (ref 6–23)
BUN BLDV-MCNC: 29 MG/DL (ref 6–23)
CALCIUM SERPL-MCNC: 9 MG/DL (ref 8.6–10.2)
CALCIUM SERPL-MCNC: 9.1 MG/DL (ref 8.6–10.2)
CHLORIDE BLD-SCNC: 105 MMOL/L (ref 98–107)
CHLORIDE BLD-SCNC: 106 MMOL/L (ref 98–107)
CO2: 26 MMOL/L (ref 22–29)
CO2: 26 MMOL/L (ref 22–29)
COHB: 0.4 % (ref 0–1.5)
CREAT SERPL-MCNC: 0.7 MG/DL (ref 0.7–1.2)
CREAT SERPL-MCNC: 0.7 MG/DL (ref 0.7–1.2)
CRITICAL: ABNORMAL
DATE ANALYZED: ABNORMAL
DATE OF COLLECTION: ABNORMAL
EOSINOPHILS ABSOLUTE: 0.01 E9/L (ref 0.05–0.5)
EOSINOPHILS ABSOLUTE: 0.02 E9/L (ref 0.05–0.5)
EOSINOPHILS RELATIVE PERCENT: 0.1 % (ref 0–6)
EOSINOPHILS RELATIVE PERCENT: 0.2 % (ref 0–6)
GFR SERPL CREATININE-BSD FRML MDRD: >60 ML/MIN/1.73
GFR SERPL CREATININE-BSD FRML MDRD: >60 ML/MIN/1.73
GLUCOSE BLD-MCNC: 66 MG/DL (ref 74–99)
GLUCOSE BLD-MCNC: 67 MG/DL (ref 74–99)
HCO3: 22.6 MMOL/L (ref 22–26)
HCT VFR BLD CALC: 51.4 % (ref 37–54)
HCT VFR BLD CALC: 51.9 % (ref 37–54)
HEMOGLOBIN: 17.1 G/DL (ref 12.5–16.5)
HEMOGLOBIN: 17.2 G/DL (ref 12.5–16.5)
HHB: 3.9 % (ref 0–5)
IMMATURE GRANULOCYTES #: 0.06 E9/L
IMMATURE GRANULOCYTES #: 0.06 E9/L
IMMATURE GRANULOCYTES %: 0.5 % (ref 0–5)
IMMATURE GRANULOCYTES %: 0.5 % (ref 0–5)
LAB: ABNORMAL
LACTATE DEHYDROGENASE: 342 U/L (ref 135–225)
LYMPHOCYTES ABSOLUTE: 0.77 E9/L (ref 1.5–4)
LYMPHOCYTES ABSOLUTE: 0.84 E9/L (ref 1.5–4)
LYMPHOCYTES RELATIVE PERCENT: 6.9 % (ref 20–42)
LYMPHOCYTES RELATIVE PERCENT: 7.1 % (ref 20–42)
Lab: ABNORMAL
MCH RBC QN AUTO: 31 PG (ref 26–35)
MCH RBC QN AUTO: 31.2 PG (ref 26–35)
MCHC RBC AUTO-ENTMCNC: 33.1 % (ref 32–34.5)
MCHC RBC AUTO-ENTMCNC: 33.3 % (ref 32–34.5)
MCV RBC AUTO: 93.7 FL (ref 80–99.9)
MCV RBC AUTO: 93.8 FL (ref 80–99.9)
METHB: 0.5 % (ref 0–1.5)
MODE: ABNORMAL
MONOCYTES ABSOLUTE: 1.14 E9/L (ref 0.1–0.95)
MONOCYTES ABSOLUTE: 1.15 E9/L (ref 0.1–0.95)
MONOCYTES RELATIVE PERCENT: 10.2 % (ref 2–12)
MONOCYTES RELATIVE PERCENT: 9.7 % (ref 2–12)
NEUTROPHILS ABSOLUTE: 9.22 E9/L (ref 1.8–7.3)
NEUTROPHILS ABSOLUTE: 9.74 E9/L (ref 1.8–7.3)
NEUTROPHILS RELATIVE PERCENT: 82.2 % (ref 43–80)
NEUTROPHILS RELATIVE PERCENT: 82.4 % (ref 43–80)
O2 SATURATION: 96.1 % (ref 92–98.5)
O2HB: 95.2 % (ref 94–97)
OPERATOR ID: 1222
PATIENT TEMP: 37 C
PCO2: 34 MMHG (ref 35–45)
PDW BLD-RTO: 17.3 FL (ref 11.5–15)
PDW BLD-RTO: 18.2 FL (ref 11.5–15)
PH BLOOD GAS: 7.44 (ref 7.35–7.45)
PLATELET # BLD: 151 E9/L (ref 130–450)
PLATELET # BLD: 154 E9/L (ref 130–450)
PMV BLD AUTO: 10.6 FL (ref 7–12)
PMV BLD AUTO: 10.6 FL (ref 7–12)
PO2: 79.9 MMHG (ref 75–100)
POTASSIUM SERPL-SCNC: 3.77 MMOL/L (ref 3.5–5)
POTASSIUM SERPL-SCNC: 4.1 MMOL/L (ref 3.5–5)
POTASSIUM SERPL-SCNC: 4.1 MMOL/L (ref 3.5–5)
PRO-BNP: ABNORMAL PG/ML (ref 0–450)
RBC # BLD: 5.48 E12/L (ref 3.8–5.8)
RBC # BLD: 5.54 E12/L (ref 3.8–5.8)
SODIUM BLD-SCNC: 144 MMOL/L (ref 132–146)
SODIUM BLD-SCNC: 146 MMOL/L (ref 132–146)
SOURCE, BLOOD GAS: ABNORMAL
THB: 18.4 G/DL (ref 11.5–16.5)
TIME ANALYZED: 1404
TOTAL PROTEIN: 5.9 G/DL (ref 6.4–8.3)
TOTAL PROTEIN: 5.9 G/DL (ref 6.4–8.3)
WBC # BLD: 11.2 E9/L (ref 4.5–11.5)
WBC # BLD: 11.8 E9/L (ref 4.5–11.5)

## 2023-01-19 PROCEDURE — 2500000003 HC RX 250 WO HCPCS: Performed by: INTERNAL MEDICINE

## 2023-01-19 PROCEDURE — 2140000000 HC CCU INTERMEDIATE R&B

## 2023-01-19 PROCEDURE — 99232 SBSQ HOSP IP/OBS MODERATE 35: CPT | Performed by: NURSE PRACTITIONER

## 2023-01-19 PROCEDURE — 70450 CT HEAD/BRAIN W/O DYE: CPT

## 2023-01-19 PROCEDURE — 36415 COLL VENOUS BLD VENIPUNCTURE: CPT

## 2023-01-19 PROCEDURE — 6370000000 HC RX 637 (ALT 250 FOR IP): Performed by: INTERNAL MEDICINE

## 2023-01-19 PROCEDURE — 2580000003 HC RX 258: Performed by: INTERNAL MEDICINE

## 2023-01-19 PROCEDURE — 83615 LACTATE (LD) (LDH) ENZYME: CPT

## 2023-01-19 PROCEDURE — 99233 SBSQ HOSP IP/OBS HIGH 50: CPT | Performed by: INTERNAL MEDICINE

## 2023-01-19 PROCEDURE — 85025 COMPLETE CBC W/AUTO DIFF WBC: CPT

## 2023-01-19 PROCEDURE — 2700000000 HC OXYGEN THERAPY PER DAY

## 2023-01-19 PROCEDURE — 84132 ASSAY OF SERUM POTASSIUM: CPT

## 2023-01-19 PROCEDURE — 82805 BLOOD GASES W/O2 SATURATION: CPT

## 2023-01-19 PROCEDURE — 71250 CT THORAX DX C-: CPT

## 2023-01-19 PROCEDURE — 83880 ASSAY OF NATRIURETIC PEPTIDE: CPT

## 2023-01-19 PROCEDURE — 6360000002 HC RX W HCPCS: Performed by: INTERNAL MEDICINE

## 2023-01-19 PROCEDURE — 80053 COMPREHEN METABOLIC PANEL: CPT

## 2023-01-19 RX ORDER — DEXTROSE, SODIUM CHLORIDE, AND POTASSIUM CHLORIDE 5; .45; .15 G/100ML; G/100ML; G/100ML
INJECTION INTRAVENOUS CONTINUOUS
Status: DISCONTINUED | OUTPATIENT
Start: 2023-01-19 | End: 2023-01-20

## 2023-01-19 RX ADMIN — POTASSIUM CHLORIDE, DEXTROSE MONOHYDRATE AND SODIUM CHLORIDE: 150; 5; 450 INJECTION, SOLUTION INTRAVENOUS at 21:18

## 2023-01-19 RX ADMIN — DEXAMETHASONE SODIUM PHOSPHATE 6 MG: 4 INJECTION, SOLUTION INTRAMUSCULAR; INTRAVENOUS at 19:40

## 2023-01-19 RX ADMIN — ATORVASTATIN CALCIUM 40 MG: 40 TABLET, FILM COATED ORAL at 19:40

## 2023-01-19 RX ADMIN — PIPERACILLIN AND TAZOBACTAM 4500 MG: 4; .5 INJECTION, POWDER, LYOPHILIZED, FOR SOLUTION INTRAVENOUS at 15:17

## 2023-01-19 RX ADMIN — MELATONIN 3 MG ORAL TABLET 3 MG: 3 TABLET ORAL at 19:39

## 2023-01-19 RX ADMIN — TAMSULOSIN HYDROCHLORIDE 0.4 MG: 0.4 CAPSULE ORAL at 19:39

## 2023-01-19 NOTE — PROGRESS NOTES
[facility]  Department of Internal Medicine  Division of Pulmonary, Critical Care and Sleep Medicine  Consult Note    Elio Vila DO, MPH, FCCP, FACOI, FACP  Pilar Rosario DO, FCCP  MD Stacey Wahl APRN-CNP       SUBJECTIVE:    Mr. Yolette Duncan was seen and examined in his bed. At the time of my examination he is minimally responsive to painful stimuli, has not received any sedating medications since last evening that I can see. Oxygen saturation 100% on 8L, decreased to 6L NC. Discussed with bedside RN who  states patient has been combative and agitated. Spoke with Case management and social work who state the plan is for patient to be discharged to [facility] in [place] possibly today. Due to current condition I believe it is prudent to investigate his altered mental status before discharging. OBJECTIVE:    CONSTITUTIONAL:   Ill-appearing, difficult to arouse  SKIN:     No rash, No suspicious lesions, No skin discoloration  HEENT:     EOMI, MMM, No thrush  NECK:    No bruits, No JVP appreciated  CV:      2 out of 6 systolic murmur, regular. PULMONARY:   Few crackles in bases. Otherwise clear  ABDOMEN:     Soft, non-tender. BS normal. No R/R/G  EXT:    No deformities . No clubbing. No lower extremity edema, No venous stasis  PULSE:   Appears equal and palpable.   PSYCHIATRIC:  Unable to assess due to altered mental status  MS:    No fractures, No gross weakness  NEUROLOGIC:   Withdraws from tactile stimuli     DATA: IMAGING & TESTING:     LABORATORY TESTS:    CBC with Differential:    Lab Results   Component Value Date/Time    WBC 8.5 01/14/2023 06:30 PM    RBC 5.35 01/14/2023 06:30 PM    HGB 16.9 01/14/2023 06:30 PM    HCT 51.4 01/14/2023 06:30 PM     01/14/2023 06:30 PM    MCV 96.1 01/14/2023 06:30 PM    MCH 31.6 01/14/2023 06:30 PM    MCHC 32.9 01/14/2023 06:30 PM    RDW 16.8 01/14/2023 06:30 PM    BANDSPCT 3 08/29/2016 07:14 PM    METASPCT 1 08/29/2016 07:14 PM    LYMPHOPCT 16.6 01/14/2023 06:30 PM    MONOPCT 9.5 01/14/2023 06:30 PM    MYELOPCT 2 08/29/2016 07:14 PM    BASOPCT 0.5 01/14/2023 06:30 PM    MONOSABS 0.81 01/14/2023 06:30 PM    LYMPHSABS 1.42 01/14/2023 06:30 PM    EOSABS 0.07 01/14/2023 06:30 PM    BASOSABS 0.04 01/14/2023 06:30 PM     CMP:    Lab Results   Component Value Date/Time     01/17/2023 04:32 AM    K 5.0 01/17/2023 04:32 AM    K 3.6 01/14/2023 06:30 PM     01/17/2023 04:32 AM    CO2 18 01/17/2023 04:32 AM    BUN 37 01/17/2023 04:32 AM    CREATININE 0.9 01/17/2023 04:32 AM    GFRAA >60 06/10/2022 05:03 PM    LABGLOM >60 01/17/2023 04:32 AM    GLUCOSE 75 01/17/2023 04:32 AM    PROT 5.9 01/17/2023 04:32 AM    LABALBU 3.4 01/17/2023 04:32 AM    CALCIUM 9.1 01/17/2023 04:32 AM    BILITOT 0.9 01/17/2023 04:32 AM    ALKPHOS 67 01/17/2023 04:32 AM    AST 51 01/17/2023 04:32 AM    ALT 22 01/17/2023 04:32 AM        PRO-BNP:   Lab Results   Component Value Date    PROBNP 14,442 (H) 01/14/2023    PROBNP 192 08/29/2016      ABGs: No results found for: PH, PO2, PCO2  Hemoglobin A1C: No components found for: HGBA1C    IMAGING:  Imaging tests were completed and reviewed and discussed radiology and care team involved and reveals   XR CHEST PORTABLE    Result Date: 1/14/2023  EXAMINATION: ONE XRAY VIEW OF THE CHEST 1/14/2023 7:35 pm COMPARISON: 06/10/2022 HISTORY: ORDERING SYSTEM PROVIDED HISTORY: short of breath TECHNOLOGIST PROVIDED HISTORY: Reason for exam:->short of breath What reading provider will be dictating this exam?->CRC FINDINGS: The lungs are without acute focal process. There is no effusion or pneumothorax. Cardiomegaly. The osseous structures are without acute process. No acute process.      XR CHEST 1 VIEW    Result Date: 1/17/2023  EXAMINATION: ONE XRAY VIEW OF THE CHEST 1/17/2023 11:52 am COMPARISON: 01/14/2023 chest radiograph and CTA chest. HISTORY: ORDERING SYSTEM PROVIDED HISTORY: hypoxia TECHNOLOGIST PROVIDED HISTORY: Reason for exam:->hypoxia What reading provider will be dictating this exam?->CRC FINDINGS: There are interstitial and patchy airspace densities throughout the lungs. There is probable blunting of right costophrenic sulcus. Cardiac silhouette is enlarged. Prominent right paratracheal soft tissues is probably due to rotation of the current study. There is atherosclerotic calcification of the thoracic aorta. There is moderate thoracic spondylosis. There are multiple remote left lateral rib fractures. No pneumothorax. Bilateral lung infiltrates consistent with pulmonary edema and or pneumonia. Probable small right pleural effusion. Stable enlargement of the cardiac silhouette. CTA PULMONARY W CONTRAST    Result Date: 1/14/2023  EXAMINATION: CTA OF THE CHEST 1/14/2023 10:17 pm TECHNIQUE: CTA of the chest was performed after the administration of intravenous contrast.  Multiplanar reformatted images are provided for review. MIP images are provided for review. Automated exposure control, iterative reconstruction, and/or weight based adjustment of the mA/kV was utilized to reduce the radiation dose to as low as reasonably achievable. COMPARISON: None. HISTORY: ORDERING SYSTEM PROVIDED HISTORY: r/o pe TECHNOLOGIST PROVIDED HISTORY: Reason for exam:->r/o pe Decision Support Exception - unselect if not a suspected or confirmed emergency medical condition->Emergency Medical Condition (MA) What reading provider will be dictating this exam?->CRC FINDINGS: Pulmonary Arteries: Pulmonary arteries are adequately opacified for evaluation. No evidence of intraluminal filling defect to suggest pulmonary embolism. Main pulmonary artery is normal in caliber. Mediastinum: No evidence of mediastinal lymphadenopathy. The heart and pericardium demonstrate no acute abnormality. There is no acute abnormality of the thoracic aorta. Lungs/pleura:  The lungs are without acute process. There is mild bilateral peribronchial thickening. No focal consolidation. There is mild interlobular septal thickening. No evidence of pneumothorax. Moderate bilateral pleural effusions. Upper Abdomen: Limited images of the upper abdomen are unremarkable. Soft Tissues/Bones: No acute bone or soft tissue abnormality. No evidence of pulmonary embolism. Mild interlobular septal thickening, which could suggest slight pulmonary edema. Moderate bilateral pleural effusions. Clinical and laboratory correlation recommended. Chest  X-ray January 17, 2023   Bilateral lung infiltrates consistent with pulmonary edema and or pneumonia. Probable small right pleural effusion. Stable enlargement of the cardiac silhouette. HISTORY OF PRESENT ILLNESS : Severiano Chain 80 y.o. male was seen in consultation regarding the above chief compliant. Patient presented to the emergency room regarding shortness of breath. He did have a positive COVID test.  Unfortunately he is a poor historian. At time of my examination he is quite drowsy and is not oriented. He has apparently been quite agitated intermittently. Unfortunately I am unsure of his baseline mentation. Assessment:     Acute hypoxemic respiratory failure, currently on 6L  COVID-19 positive  Delirium with altered mental status, only responsive to painful stimuli on exam. 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   Continue Decadron, once IV access is re-established  CRP 4.9 and LDH rejected   Continue breathing treatments ordered however patient is not currently able to cooperate with these  Delirium precautions  Recommend avoiding all sedating medications including pain medication and Haldol  Appreciate cardiology recommendations  Aspiration precautions, HOB elevated at least 30 degrees  Strict I&Os    Case and plan discussed with Dr. Jolanta Rosario, bedside RN and Case management   Stacey Jimenez, MARGOT - CNP

## 2023-01-19 NOTE — PROGRESS NOTES
INPATIENT CARDIOLOGY CONSULT    Name: Lonnell Babinski    Age: 80 y.o. Date of Admission: 1/14/2023  5:57 PM    Date of Service: 1/18/2023    Reason for Consultation: CHF, AS    Referring Physician: Alisson Goodman MD    Primary Cardiologist: Christopher Salazar    Interim:  Visit done remotely due to ongoing treatment for COVID-19. Still awaiting transfer to [facility] per nursing staff    It is reported  patient has close follow-up with [facility] and being considered for TAVR at the [facility]. Chart record shows patient will be discharged and once discharged and follow-up with the [facility] cardiac center for a plan for TAVR. Past Medical History:  Past Medical History:   Diagnosis Date    Asthma     MILD, no issues    BPH (benign prostatic hypertrophy)     CAD (coronary artery disease)     follows with Dr Raysa Jenkins. COPD (chronic obstructive pulmonary disease) ([address]     Glaucoma     patient unsure    Hyperlipidemia     Hypertension     Preoperative clearance 10/10/2017    cardiac, Dr Raysa Jenknis. Retention of urine     Valvular heart disease     MV insufficiency, tricuspid regurgitation         Past Surgical History:  Past Surgical History:   Procedure Laterality Date    CATARACT REMOVAL  11/10 & 1/11    LEFT & RIGHT    COLONOSCOPY  11/7/2011    DIVERTICULOSIS    CORONARY ANGIOPLASTY  10/02/02    2 STENTS OF OM2 WITH GOOD RESULTS. DIAGNOSTIC CARDIAC CATH LAB PROCEDURE  10/02/02    DONE SECONDARY TO RECURRENT CP & MILD ISCHEMIA OF ANTEROLATERAL WALL ON NUCLEAR, SHOWD 30-40% 2ND DIAGONAL STENOSIS.  20-30% MID LAD STENSOIS. 90% MID OM2 STENOSIS. PATENT RCA. NORMAL LV FUNCTION W/EF MORE THAN 55%.     EYE SURGERY      HIP ARTHROPLASTY Right 11/07/2017    SKIN BIOPSY      TONSILLECTOMY      TUMOR EXCISION  4 YRS AGO    RIGHT EAR       Family History:  Family History   Problem Relation Age of Onset    Hypertension Sister     Cancer Brother     Cancer Sister        Social History:  Social History     Tobacco Use    Smoking status: Former     Packs/day: 2.00     Years: 15.00     Pack years: 30.00     Types: Cigarettes    Smokeless tobacco: Never    Tobacco comments:     quit 45 years ago   Vaping Use    Vaping Use: Never used   Substance Use Topics    Alcohol use: Yes     Comment: Socially/ coffee daily     Drug use: No       Allergies:  No Known Allergies    Home Medications:  Prior to Admission medications    Medication Sig Start Date End Date Taking? Authorizing Provider   aspirin 81 MG EC tablet Take 1 tablet by mouth daily DVT prophylaxis following hip replacement x 30 days 1/16/23  Yes Tamra Miles MD   metoprolol succinate (TOPROL XL) 25 MG extended release tablet Take 1 tablet by mouth daily 1/17/23  Yes Tamra Miles MD   furosemide (LASIX) 20 MG tablet Take 1 tablet by mouth three times a week 1/16/23  Yes Tamra Miles MD   Rosuvastatin Calcium 20 MG CPSP Take by mouth   Yes Historical Provider, MD   acetaminophen (TYLENOL) 325 MG tablet Take 2 tablets by mouth every 6 hours as needed for Pain 11/9/17 11/30/22  Bianka Leiva MD   Multiple Vitamins-Minerals (OCUVITE) TABS oral tablet Take 1 tablet by mouth daily    Historical Provider, MD   tamsulosin (FLOMAX) 0.4 MG capsule Take 0.4 mg by mouth daily. 12/15/14   Historical Provider, MD   Multiple Vitamins-Minerals (CENTRUM SILVER ADULT 50+) TABS Take 1 tablet by mouth daily. Historical Provider, MD   finasteride (PROSCAR) 5 MG tablet Take 5 mg by mouth daily.       Historical Provider, MD       Current Medications:    Current Facility-Administered Medications:     piperacillin-tazobactam (ZOSYN) 4,500 mg in dextrose 5 % 100 mL IVPB ([id number]), 4,500 mg, IntraVENous, Q8H, Tamra Miles MD, Last Rate: 25 mL/hr at 01/18/23 1606, 4,500 mg at 01/18/23 1606    ipratropium-albuterol (DUONEB) nebulizer solution 1 ampule, 1 ampule, Inhalation, 4x daily, Tamra Miles MD, 1 ampule at 01/18/23 0920    haloperidol lactate (HALDOL) injection 2 mg, 2 mg, IntraMUSCular, Q6H PRN, Rubin Wharton MD, 2 mg at 01/18/23 1841    acetaminophen (TYLENOL) tablet 650 mg, 650 mg, Oral, Q6H PRN, Rubin Wharton MD    aspirin EC tablet 81 mg, 81 mg, Oral, Daily, Rubin Wharton MD, 81 mg at 01/17/23 1052    finasteride (PROSCAR) tablet 5 mg, 5 mg, Oral, Daily, Rubin Wharton MD, 5 mg at 01/17/23 1051    ocuvite-lutein multivitamin 1 capsule, 1 capsule, Oral, Daily, Rubin Wharton MD, 1 capsule at 01/17/23 1051    enoxaparin (LOVENOX) injection 40 mg, 40 mg, SubCUTAneous, Daily, Rubin Wharton MD, 40 mg at 01/17/23 1051    melatonin tablet 3 mg, 3 mg, Oral, Nightly PRN, Rubin Wharton MD    potassium chloride (KLOR-CON M) extended release tablet 20 mEq, 20 mEq, Oral, Daily with breakfast, Rubin Wharton MD, 20 mEq at 01/17/23 1052    atorvastatin (LIPITOR) tablet 40 mg, 40 mg, Oral, Nightly, Leonie Murillo MD, 40 mg at 01/16/23 2003    tamsulosin (FLOMAX) capsule 0.4 mg, 0.4 mg, Oral, QHS, Leonie Murillo MD, 0.4 mg at 01/16/23 2040    metoprolol succinate (TOPROL XL) extended release tablet 25 mg, 25 mg, Oral, Daily, Leonie Murillo MD, 25 mg at 01/17/23 1054    dexamethasone (DECADRON) injection 6 mg, 6 mg, IntraVENous, Q24H, Rubin Wharton MD, 6 mg at 01/15/23 2058    Physical Exam:  BP (!) 126/96   Pulse 90   Temp 97.9 °F (36.6 °C) (Axillary)   Resp 24   Ht 5' 4\" (1.626 m)   Wt 145 lb (65.8 kg)   SpO2 95%   BMI 24.89 kg/m²   Wt Readings from Last 3 Encounters:   01/14/23 145 lb (65.8 kg)   12/15/22 145 lb (65.8 kg)   12/07/22 141 lb (64 kg)     Appearance: Elderly male, awake, alert, appears dyspneic, on nasal cannula  Skin: Intact, no rash  Head: Normocephalic, atraumatic  Eyes: EOMI, no conjunctival erythema  ENMT: No pharyngeal erythema, MMM, no rhinorrhea  Neck: Supple, mildly elevated JVP, no carotid bruits  Lungs: Diminished, few basilar rales  Cardiac: PMI nondisplaced, regular rhythm with a normal rate, frequent ectopy, normal S1 & S2, systolic murmur difficult to characterize given heavy breathing and inability to hold his breath for more than a second  Abdomen: Soft, nontender, +bowel sounds  Extremities: Moves all extremities x 4, 1+ dependent lower extremity edema  Neurologic: No focal motor deficits apparent, normal mood and affect  Peripheral Pulses: Intact posterior tibial pulses bilaterally    Intake/Output:    Intake/Output Summary (Last 24 hours) at 2023 1907  Last data filed at 2023 6799  Gross per 24 hour   Intake 0 ml   Output --   Net 0 ml     No intake/output data recorded. Laboratory Tests:  Recent Labs     23      K 5.0      CO2 18*   BUN 37*   CREATININE 0.9   GLUCOSE 75   CALCIUM 9.1     Lab Results   Component Value Date/Time    MG 2.2 01/15/2023 05:46 AM     Recent Labs     23   ALKPHOS 67   ALT 22   AST 51*   PROT 5.9*   BILITOT 0.9   LABALBU 3.4*     No results for input(s): WBC, RBC, HGB, HCT, MCV, MCH, MCHC, RDW, PLT, MPV in the last 72 hours. Lab Results   Component Value Date    TROPONINI <0.01 2016     No results found for: INR, PROTIME  Lab Results   Component Value Date    TSH 0.939 2016     No results found for: LABA1C  No results found for: EAG  No results found for: CHOL  No results found for: TRIG  No results found for: HDL  No results found for: LDLCALC, LDLCHOLESTEROL  No results found for: LABVLDL, VLDL  No results found for: CHOLHDLRATIO  No results for input(s): PROBNP in the last 72 hours. Cardiac Tests:  EK2023: Sinus rhythm with PACs and PVCs. RBBB QRS duration 130 ms    Telemetry: Sinus rhythm 70s with PACs and frequent PVCs and couplets    Chest X-ray:   23       Impression   No acute process. CTA chest  Personally reviewed. Moderate seems like an overcall on these effusions    Impression   No evidence of pulmonary embolism.        Mild interlobular septal thickening, which could suggest slight pulmonary edema.  Moderate bilateral pleural effusions. Clinical and laboratory   correlation recommended. Echocardiogram:   TTE 12/2022 [facility]   Summary   Top normal left ventricle size. Ejection fraction is visually estimated at 45%. Mild to moderate centrally directed mitral insufficiency   Severe aortic stenosis. Mild aortic regurgitation. Normal tricuspid valve structure and function. RVSP is 45 mmHg. Pulmonary hypertension is mild . Stress test:    Pharm stress 12/7/22  Gated SPECT left ventricular ejection fraction was calculated to be 36%, with global hypokinesis in the absence of regional wall motion abnormalities. Impression:    Electrocardiographically normal regadenoson infusion with a clinically nonischemic response. Myocardial perfusion imaging was normal.    Overall left ventricular systolic function was  mild to moderately impaired with global hypokinesis and no regional wall motion abnormalities with a dilated left ventricular chamber . 4. Intermediate risk pharmacologic stress test    Cardiac catheterization:   10/2002 2.75 x12, 2.75 x 8 [facility] -  OM2 on 10/2/2002 - Dr Donalee Heimlich    -------------------------------------------------------------------------------------------------------------------------------------------------------------  IMPRESSION:  Acute on chronic heart failure with reduced ejection fraction. proBNP 14,000  Minimally elevated hs-cTnT: 50-44-48 ng/L flat pattern likely chronic myocardial injury. No evidence of ACS  Frequent PVCs  Cardiomyopathy EF 40-45% by echo, 36% by SPECT.   Mixed ischemic, valvular  Severe aortic stenosis with mild AR, mild to moderate MR, mild TR  Coronary artery disease PCI to OM 2002  RBBB  COVID-19 pneumonia  Acute hypoxic respiratory failure  Bilateral pleural effusions  Hypertension  COPD  Hyperlipidemia    RECOMMENDATIONS:  Apparently patient has close follow-up with [facility]  [facility] clinic and [facility]. Chart record shows patient will be discharged and once discharged and follow-up with the [facility] cardiac center for a plan for TAVR. Follow-up with your cardiologist in the outpatient with [facility] for an ischemic and TAVR evaluation. Continue on cardiac medications as blood pressure allows   Avoid hypotension given severe aortic stenosis and discussed   Initiate low-dose Entresto if there is no contraindication and blood pressure can allow without hypotension         Thank you for allowing me to participate in your patient's care. Please feel free to contact me if you have any questions or concerns.     Rita Blanton MD  [facility]) Cardiology

## 2023-01-19 NOTE — PROGRESS NOTES
Patient is rolling back and forth in bed picking at skin in attempt to pull out IV sites. Currently his bilateral upper arms have multiple small lacerations from patients nails. Sites have been cleansed and left JUNIOR. This nurse is attempting to reorient patient with the nursing assistant/ sitter at bedside but patient is increasingly becoming more agitated.

## 2023-01-19 NOTE — PROGRESS NOTES
Unable to administer patient IV ATB or oral meds. Patients current iv access is infiltrated. Notified Pulmonary via perfect serve to see what they would like done since prev notes states to avoid sedating medications. Awaiting further orders.

## 2023-01-19 NOTE — PROGRESS NOTES
Angely Shrestha MD FACP  Internal medicine  Progress Notes      CHIEF COMPLAINT: Shortness of breath      HISTORY OF PRESENT ILLNESS:    1/15/2023  Patient was seen on the floor earlier this morning at the main campus of [facility]  He was in isolation room due to [place] test being positive  He was a poor historian  Registered nurse at bedside  77-year-old  man being evaluated for TAVR at [facility] for severe aortic stenosis  He claims that for the last 1 month he has not felt well with worsening shortness of breath  He does not know if he had fever or chills  Tested positive for COVID  Was in CHF  Responded very well to 1 dose of IV Lasix  Admitted for further management  1/16/2023  Patient was seen on the floor earlier this morning  Overnight combativeness reported  He was pulling IVs out and wanted to go home  This morning he is fully oriented and wants to go home  Not requiring any supplemental oxygen  No dyspnea or acute distress  1/17/2023  Patient was seen on the floor earlier this morning  Not doing well  Discharge was canceled yesterday  Currently in restraints due to combativeness and pulling on lines  On supplemental oxygen  1/18/2023  Patient was seen on the floor earlier this morning  He was in isolation room due to COVID-positive test  Requiring restraints due to agitation  On supplemental oxygen  Chest x-ray showing infiltrates  1/19/2023  Patient was seen on the floor earlier this morning  Spoke with pulmonary  Remains agitated  Past Medical History:    Past Medical History:   Diagnosis Date    Asthma     MILD, no issues    BPH (benign prostatic hypertrophy)     CAD (coronary artery disease)     follows with Dr Henry Eaton. COPD (chronic obstructive pulmonary disease) ([facility] [address]     Glaucoma     patient unsure    Hyperlipidemia     Hypertension     Preoperative clearance 10/10/2017    cardiac, Dr Henry Eaton.       Retention of urine     Valvular heart disease     MV insufficiency, tricuspid regurgitation         Past Surgical History:    Past Surgical History:   Procedure Laterality Date    CATARACT REMOVAL  11/10 & 1/11    LEFT & RIGHT    COLONOSCOPY  11/7/2011    DIVERTICULOSIS    CORONARY ANGIOPLASTY  10/02/02    2 STENTS OF OM2 WITH GOOD RESULTS. DIAGNOSTIC CARDIAC CATH LAB PROCEDURE  10/02/02    DONE SECONDARY TO RECURRENT CP & MILD ISCHEMIA OF ANTEROLATERAL WALL ON NUCLEAR, SHOWD 30-40% 2ND DIAGONAL STENOSIS.  20-30% MID LAD STENSOIS. 90% MID OM2 STENOSIS. PATENT RCA. NORMAL LV FUNCTION W/EF MORE THAN 55%. EYE SURGERY      HIP ARTHROPLASTY Right 11/07/2017    SKIN BIOPSY      TONSILLECTOMY      TUMOR EXCISION  4 YRS AGO    RIGHT EAR       Medications Prior to Admission:    Medications Prior to Admission: Rosuvastatin Calcium 20 MG CPSP, Take by mouth  acetaminophen (TYLENOL) 325 MG tablet, Take 2 tablets by mouth every 6 hours as needed for Pain  Multiple Vitamins-Minerals (OCUVITE) TABS oral tablet, Take 1 tablet by mouth daily  tamsulosin (FLOMAX) 0.4 MG capsule, Take 0.4 mg by mouth daily. Multiple Vitamins-Minerals (CENTRUM SILVER ADULT 50+) TABS, Take 1 tablet by mouth daily. finasteride (PROSCAR) 5 MG tablet, Take 5 mg by mouth daily. [DISCONTINUED] metoprolol (TOPROL-XL) 25 MG XL tablet, Take 12.5 mg by mouth nightly    Allergies:    Patient has no known allergies. Social History:    reports that he has quit smoking. His smoking use included cigarettes. He has a 30.00 pack-year smoking history. He has never used smokeless tobacco. He reports current alcohol use. He reports that he does not use drugs. Family History:   family history includes Cancer in his brother and sister; Hypertension in his sister.     REVIEW OF SYSTEMS:  As above in the HPI, otherwise negative    PHYSICAL EXAM:    Vitals:  BP (!) 144/95   Pulse 90   Temp (!) 95.9 °F (35.5 °C) (Axillary)   Resp 16   Ht 5' 4\" (1.626 m)   Wt 145 lb (65.8 kg)   SpO2 99%   BMI 24.89 kg/m²     General: Awake, alert, oriented X 3. Somewhat confused  And combative  HEENT:  Normocephalic, atraumatic. Pupils equal, round, reactive to light. No scleral icterus. No conjunctival injection. No nasal discharge. Oral mucosa is dry neck:  Supple  Heart:  RRR, no murmurs, gallops, rubs  Lungs: Diffuse wheezes noted  Abdomen:   Bowel sounds present, soft, nontender, no masses, no organomegaly, no peritoneal signs  Extremities:  No clubbing, cyanosis, or edema  Skin:  Warm and dry, no open lesions or rash  Neuro:  Cranial nerves 2-12 intact, no focal deficits  Generalized muscle atrophy noted  Breast: deferred  Rectal: deferred  Genitalia:  deferred    LABS:  Data reviewed      ASSESSMENT:    Appears dehydrated clinically   bronchospastic this morning  Suspected aspiration  Principal Problem:    Acute respiratory failure with hypoxia (HCC)  COVID-positive test  Acute CHF/well compensated now  Severe aortic stenosis  Remote coronary artery disease      PLAN:  Start IV hydration  Add Zosyn   add DuoNeb  Continue steroids/input appreciated  Resume other home medications    Gil Wagoner MD  3:25 PM  1/19/2023

## 2023-01-19 NOTE — PROGRESS NOTES
Occupational Therapy  OT BEDSIDE TREATMENT NOTE   [facility] [address] [facility]  [address] [place] [address]      [id number]  Patient Name: Severiano Chain  MRN: [id number]  : 3/20/1930  Room: [address] [place] [address]       Attempted OT session this date:    [] unavailable due to other medical staff currently with pt   [x] on hold per RN, pt not appropriate, not following any commands this date. Will re attempt. [] on hold per nursing staff secondary to lab / radiology results    [] declined treatment  this date due to ____. Benefits of participation in therapy reviewed with pt.    [] off unit   [] Other:      Continue with current OT [address] [place] [address] CRAWFORD/L [id number]

## 2023-01-19 NOTE — CARE COORDINATION
Patient currently on 6L NC, from 8L, proBNP elevated at 25,864 and . Patient to continue Decadron once IV access is re-established; CT of the head and brain ordered, pulmonology following. Plan is for patient to discharge to [facility]) when medically cleared for discharge; pre-cert pending and initiated yesterday. [facility] made to [facility]; patient placed on will-call for tomorrow. Ambulance form completed, 7000 started and envelope placed on the soft chart.     Consuelo Moralez, MSW, LSW [phone]

## 2023-01-19 NOTE — PROGRESS NOTES
Awaiting new orders regarding patients increased agitation. Notified Attending Dr. Puja Yuen via perfect serve.

## 2023-01-19 NOTE — PROGRESS NOTES
Patient in bed pulling at Iv sites and flailing around. Lab attempted to collected blood from patient and patient became aggressive swinging at phlebotomist. Attending notified and a one time order for 5mg IM Zyprexa times one was placed.

## 2023-01-20 PROBLEM — Z51.5 PALLIATIVE CARE ENCOUNTER: Status: ACTIVE | Noted: 2023-01-20

## 2023-01-20 LAB
ACANTHOCYTES: ABNORMAL
ALBUMIN SERPL-MCNC: 3.2 G/DL (ref 3.5–5.2)
ALP BLD-CCNC: 67 U/L (ref 40–129)
ALT SERPL-CCNC: 35 U/L (ref 0–40)
ANION GAP SERPL CALCULATED.3IONS-SCNC: 22 MMOL/L (ref 7–16)
ANISOCYTOSIS: ABNORMAL
AST SERPL-CCNC: 43 U/L (ref 0–39)
BASOPHILS ABSOLUTE: 0 E9/L (ref 0–0.2)
BASOPHILS RELATIVE PERCENT: 0 % (ref 0–2)
BETA-HYDROXYBUTYRATE: 0.94 MMOL/L (ref 0.02–0.27)
BILIRUB SERPL-MCNC: 1.2 MG/DL (ref 0–1.2)
BUN BLDV-MCNC: 31 MG/DL (ref 6–23)
BURR CELLS: ABNORMAL
CALCIUM SERPL-MCNC: 8.2 MG/DL (ref 8.6–10.2)
CHLORIDE BLD-SCNC: 97 MMOL/L (ref 98–107)
CO2: 23 MMOL/L (ref 22–29)
CREAT SERPL-MCNC: 0.7 MG/DL (ref 0.7–1.2)
EOSINOPHILS ABSOLUTE: 0 E9/L (ref 0.05–0.5)
EOSINOPHILS RELATIVE PERCENT: 0 % (ref 0–6)
GFR SERPL CREATININE-BSD FRML MDRD: >60 ML/MIN/1.73
GLUCOSE BLD-MCNC: 347 MG/DL (ref 74–99)
HCT VFR BLD CALC: 48.5 % (ref 37–54)
HEMOGLOBIN: 16.3 G/DL (ref 12.5–16.5)
IMMATURE GRANULOCYTES #: 0.05 E9/L
IMMATURE GRANULOCYTES %: 0.7 % (ref 0–5)
LYMPHOCYTES ABSOLUTE: 0.39 E9/L (ref 1.5–4)
LYMPHOCYTES RELATIVE PERCENT: 5.4 % (ref 20–42)
MCH RBC QN AUTO: 31.9 PG (ref 26–35)
MCHC RBC AUTO-ENTMCNC: 33.6 % (ref 32–34.5)
MCV RBC AUTO: 94.9 FL (ref 80–99.9)
MONOCYTES ABSOLUTE: 0.51 E9/L (ref 0.1–0.95)
MONOCYTES RELATIVE PERCENT: 7.1 % (ref 2–12)
NEUTROPHILS ABSOLUTE: 6.27 E9/L (ref 1.8–7.3)
NEUTROPHILS RELATIVE PERCENT: 86.8 % (ref 43–80)
OVALOCYTES: ABNORMAL
PDW BLD-RTO: 16.7 FL (ref 11.5–15)
PLATELET # BLD: 159 E9/L (ref 130–450)
PMV BLD AUTO: 10.6 FL (ref 7–12)
POIKILOCYTES: ABNORMAL
POLYCHROMASIA: ABNORMAL
POTASSIUM SERPL-SCNC: 4 MMOL/L (ref 3.5–5)
RBC # BLD: 5.11 E12/L (ref 3.8–5.8)
SODIUM BLD-SCNC: 142 MMOL/L (ref 132–146)
TOTAL PROTEIN: 6.2 G/DL (ref 6.4–8.3)
WBC # BLD: 7.2 E9/L (ref 4.5–11.5)

## 2023-01-20 PROCEDURE — 99232 SBSQ HOSP IP/OBS MODERATE 35: CPT | Performed by: INTERNAL MEDICINE

## 2023-01-20 PROCEDURE — 2140000000 HC CCU INTERMEDIATE R&B

## 2023-01-20 PROCEDURE — 6360000002 HC RX W HCPCS: Performed by: INTERNAL MEDICINE

## 2023-01-20 PROCEDURE — 6370000000 HC RX 637 (ALT 250 FOR IP): Performed by: INTERNAL MEDICINE

## 2023-01-20 PROCEDURE — 99233 SBSQ HOSP IP/OBS HIGH 50: CPT | Performed by: INTERNAL MEDICINE

## 2023-01-20 PROCEDURE — 82010 KETONE BODYS QUAN: CPT

## 2023-01-20 PROCEDURE — 36415 COLL VENOUS BLD VENIPUNCTURE: CPT

## 2023-01-20 PROCEDURE — 2580000003 HC RX 258: Performed by: INTERNAL MEDICINE

## 2023-01-20 PROCEDURE — 99232 SBSQ HOSP IP/OBS MODERATE 35: CPT | Performed by: NURSE PRACTITIONER

## 2023-01-20 PROCEDURE — 99223 1ST HOSP IP/OBS HIGH 75: CPT | Performed by: NURSE PRACTITIONER

## 2023-01-20 PROCEDURE — 85025 COMPLETE CBC W/AUTO DIFF WBC: CPT

## 2023-01-20 PROCEDURE — 2700000000 HC OXYGEN THERAPY PER DAY

## 2023-01-20 PROCEDURE — 80053 COMPREHEN METABOLIC PANEL: CPT

## 2023-01-20 RX ADMIN — ENOXAPARIN SODIUM 40 MG: 100 INJECTION SUBCUTANEOUS at 08:44

## 2023-01-20 RX ADMIN — POTASSIUM CHLORIDE 20 MEQ: 20 TABLET, EXTENDED RELEASE ORAL at 08:44

## 2023-01-20 RX ADMIN — PIPERACILLIN AND TAZOBACTAM 4500 MG: 4; .5 INJECTION, POWDER, LYOPHILIZED, FOR SOLUTION INTRAVENOUS at 14:50

## 2023-01-20 RX ADMIN — HALOPERIDOL LACTATE 2 MG: 5 INJECTION, SOLUTION INTRAMUSCULAR at 13:28

## 2023-01-20 RX ADMIN — ACETAMINOPHEN 650 MG: 325 TABLET ORAL at 09:50

## 2023-01-20 RX ADMIN — FINASTERIDE 5 MG: 5 TABLET, FILM COATED ORAL at 08:45

## 2023-01-20 RX ADMIN — ASPIRIN 81 MG: 81 TABLET, COATED ORAL at 08:49

## 2023-01-20 RX ADMIN — METOPROLOL SUCCINATE 25 MG: 25 TABLET, EXTENDED RELEASE ORAL at 08:50

## 2023-01-20 RX ADMIN — DEXAMETHASONE SODIUM PHOSPHATE 6 MG: 4 INJECTION, SOLUTION INTRAMUSCULAR; INTRAVENOUS at 20:28

## 2023-01-20 RX ADMIN — ATORVASTATIN CALCIUM 40 MG: 40 TABLET, FILM COATED ORAL at 20:28

## 2023-01-20 RX ADMIN — Medication 1 CAPSULE: at 08:44

## 2023-01-20 RX ADMIN — TAMSULOSIN HYDROCHLORIDE 0.4 MG: 0.4 CAPSULE ORAL at 20:27

## 2023-01-20 RX ADMIN — PIPERACILLIN AND TAZOBACTAM 4500 MG: 4; .5 INJECTION, POWDER, LYOPHILIZED, FOR SOLUTION INTRAVENOUS at 22:46

## 2023-01-20 RX ADMIN — PIPERACILLIN AND TAZOBACTAM 4500 MG: 4; .5 INJECTION, POWDER, LYOPHILIZED, FOR SOLUTION INTRAVENOUS at 06:32

## 2023-01-20 ASSESSMENT — PAIN DESCRIPTION - ORIENTATION: ORIENTATION: MID

## 2023-01-20 ASSESSMENT — PAIN SCALES - GENERAL
PAINLEVEL_OUTOF10: 4
PAINLEVEL_OUTOF10: 2
PAINLEVEL_OUTOF10: 3
PAINLEVEL_OUTOF10: 3

## 2023-01-20 ASSESSMENT — PAIN - FUNCTIONAL ASSESSMENT: PAIN_FUNCTIONAL_ASSESSMENT: ACTIVITIES ARE NOT PREVENTED

## 2023-01-20 ASSESSMENT — PAIN DESCRIPTION - DESCRIPTORS: DESCRIPTORS: ACHING;JABBING;PRESSURE

## 2023-01-20 ASSESSMENT — PAIN DESCRIPTION - LOCATION: LOCATION: BACK

## 2023-01-20 NOTE — PROGRESS NOTES
Patient pulled out IV access. Unable to administer ordered fluid or IV ATB at this time. Will attempt to insert new IV access when patient becomes less agitated.

## 2023-01-20 NOTE — PROGRESS NOTES
Upon assessment, patients right shoulder is noted to have a large amount of swelling and slight bruising. Attending notified. Awaiting new orders.

## 2023-01-20 NOTE — PROGRESS NOTES
INPATIENT CARDIOLOGY CONSULT    Name: Carolyn Pihllips    Age: 80 y.o. Date of Admission: 1/14/2023  5:57 PM    Date of Service: 1/19/2023    Reason for Consultation: CHF, AS    Referring Physician: Bekah Jackson MD    Primary Cardiologist: Hever Jay    Interim:  Visit done remotely due to ongoing treatment for COVID-19. Still awaiting transfer to [facility] per nursing staff    It is reported  patient has close follow-up with [facility] and being considered for TAVR at the [facility]. Chart record shows patient will be discharged and once discharged and follow-up with the [facility] cardiac center for a plan for TAVR. FiO2 is being weaned and feeding tube is being discussed. Blood pressure not adequately controlled. Past Medical History:  Past Medical History:   Diagnosis Date    Asthma     MILD, no issues    BPH (benign prostatic hypertrophy)     CAD (coronary artery disease)     follows with Dr Curtis Emery. COPD (chronic obstructive pulmonary disease) ([address]     Glaucoma     patient unsure    Hyperlipidemia     Hypertension     Preoperative clearance 10/10/2017    cardiac, Dr Curtis Emery. Retention of urine     Valvular heart disease     MV insufficiency, tricuspid regurgitation         Past Surgical History:  Past Surgical History:   Procedure Laterality Date    CATARACT REMOVAL  11/10 & 1/11    LEFT & RIGHT    COLONOSCOPY  11/7/2011    DIVERTICULOSIS    CORONARY ANGIOPLASTY  10/02/02    2 STENTS OF OM2 WITH GOOD RESULTS. DIAGNOSTIC CARDIAC CATH LAB PROCEDURE  10/02/02    DONE SECONDARY TO RECURRENT CP & MILD ISCHEMIA OF ANTEROLATERAL WALL ON NUCLEAR, SHOWD 30-40% 2ND DIAGONAL STENOSIS.  20-30% MID LAD STENSOIS. 90% MID OM2 STENOSIS. PATENT RCA. NORMAL LV FUNCTION W/EF MORE THAN 55%.     EYE SURGERY      HIP ARTHROPLASTY Right 11/07/2017    SKIN BIOPSY      TONSILLECTOMY      TUMOR EXCISION  4 YRS AGO    RIGHT EAR       Family History:  Family History   Problem Relation Age of Onset    Hypertension Sister     Cancer Brother     Cancer Sister        Social History:  Social History     Tobacco Use    Smoking status: Former     Packs/day: 2.00     Years: 15.00     Pack years: 30.00     Types: Cigarettes    Smokeless tobacco: Never    Tobacco comments:     quit 45 years ago   Vaping Use    Vaping Use: Never used   Substance Use Topics    Alcohol use: Yes     Comment: Socially/ coffee daily     Drug use: No       Allergies:  No Known Allergies    Home Medications:  Prior to Admission medications    Medication Sig Start Date End Date Taking? Authorizing Provider   aspirin 81 MG EC tablet Take 1 tablet by mouth daily DVT prophylaxis following hip replacement x 30 days 1/16/23  Yes Michelle Gonsalves MD   metoprolol succinate (TOPROL XL) 25 MG extended release tablet Take 1 tablet by mouth daily 1/17/23  Yes Michelle Gonsalves MD   furosemide (LASIX) 20 MG tablet Take 1 tablet by mouth three times a week 1/16/23  Yes Michelle Gonsalves MD   Rosuvastatin Calcium 20 MG CPSP Take by mouth   Yes Historical Provider, MD   acetaminophen (TYLENOL) 325 MG tablet Take 2 tablets by mouth every 6 hours as needed for Pain 11/9/17 11/30/22  Estrella Arce MD   Multiple Vitamins-Minerals (OCUVITE) TABS oral tablet Take 1 tablet by mouth daily    Historical Provider, MD   tamsulosin (FLOMAX) 0.4 MG capsule Take 0.4 mg by mouth daily. 12/15/14   Historical Provider, MD   Multiple Vitamins-Minerals (CENTRUM SILVER ADULT 50+) TABS Take 1 tablet by mouth daily. Historical Provider, MD   finasteride (PROSCAR) 5 MG tablet Take 5 mg by mouth daily.       Historical Provider, MD       Current Medications:    Current Facility-Administered Medications:     dextrose 5 % and 0.45 % NaCl with KCl 20 mEq infusion, , IntraVENous, Continuous, Michelle Gonsalves MD    piperacillin-tazobactam (ZOSYN) 4,500 mg in dextrose 5 % 100 mL IVPB ([id number]), 4,500 mg, IntraVENous, Q8H, Michelle Gonsalves MD, Stopped at 01/19/23 0388 ipratropium-albuterol (DUONEB) nebulizer solution 1 ampule, 1 ampule, Inhalation, 4x daily, Conchita Blackmon MD, 1 ampule at 01/18/23 0920    haloperidol lactate (HALDOL) injection 2 mg, 2 mg, IntraMUSCular, Q6H PRN, Conchita Blackmon MD, 2 mg at 01/18/23 1841    acetaminophen (TYLENOL) tablet 650 mg, 650 mg, Oral, Q6H PRN, Conchita Blackmon MD    aspirin EC tablet 81 mg, 81 mg, Oral, Daily, Conchita Blackmon MD, 81 mg at 01/17/23 1052    finasteride (PROSCAR) tablet 5 mg, 5 mg, Oral, Daily, Conchita Blackmon MD, 5 mg at 01/17/23 1051    ocuvite-lutein multivitamin 1 capsule, 1 capsule, Oral, Daily, Conchita Blackmon MD, 1 capsule at 01/17/23 1051    enoxaparin (LOVENOX) injection 40 mg, 40 mg, SubCUTAneous, Daily, Conchita Blackmon MD, 40 mg at 01/17/23 1051    melatonin tablet 3 mg, 3 mg, Oral, Nightly PRN, Conchita Blackmon MD    potassium chloride (KLOR-CON M) extended release tablet 20 mEq, 20 mEq, Oral, Daily with breakfast, Conchita Blackmon MD, 20 mEq at 01/17/23 1052    atorvastatin (LIPITOR) tablet 40 mg, 40 mg, Oral, Nightly, Annie Thomas MD, 40 mg at 01/16/23 2003    tamsulosin (FLOMAX) capsule 0.4 mg, 0.4 mg, Oral, QHS, Annie Thomas MD, 0.4 mg at 01/16/23 2040    metoprolol succinate (TOPROL XL) extended release tablet 25 mg, 25 mg, Oral, Daily, Annie Thomas MD, 25 mg at 01/17/23 1054    dexamethasone (DECADRON) injection 6 mg, 6 mg, IntraVENous, Q24H, Conchita Blackmon MD, 6 mg at 01/15/23 2058    Physical Exam:  BP (!) 157/80   Pulse 67   Temp (!) 95.9 °F (35.5 °C) (Axillary)   Resp 23   Ht 5' 4\" (1.626 m)   Wt 145 lb (65.8 kg)   SpO2 99%   BMI 24.89 kg/m²   Wt Readings from Last 3 Encounters:   01/14/23 145 lb (65.8 kg)   12/15/22 145 lb (65.8 kg)   12/07/22 141 lb (64 kg)     Appearance: Elderly male, awake, alert, appears dyspneic, on nasal cannula  Skin: Intact, no rash  Head: Normocephalic, atraumatic  Eyes: EOMI, no conjunctival erythema  ENMT: No pharyngeal erythema, MMM, no rhinorrhea  Neck: Supple, mildly elevated JVP, no carotid bruits  Lungs: Diminished, few basilar rales  Cardiac: PMI nondisplaced, regular rhythm with a normal rate, frequent ectopy, normal S1 & S2, systolic murmur difficult to characterize given heavy breathing and inability to hold his breath for more than a second  Abdomen: Soft, nontender, +bowel sounds  Extremities: Moves all extremities x 4, 1+ dependent lower extremity edema  Neurologic: No focal motor deficits apparent, normal mood and affect  Peripheral Pulses: Intact posterior tibial pulses bilaterally    Intake/Output:    Intake/Output Summary (Last 24 hours) at 1/19/2023 1928  Last data filed at 1/19/2023 7052  Gross per 24 hour   Intake 0 ml   Output --   Net 0 ml     No intake/output data recorded.     Laboratory Tests:  Recent Labs     01/17/23  0432 01/19/23  1102 01/19/23  1404    146  144  --    K 5.0 4.1  4.1 3.77    106  105  --    CO2 18* 26  26  --    BUN 37* 29*  29*  --    CREATININE 0.9 0.7  0.7  --    GLUCOSE 75 67*  66*  --    CALCIUM 9.1 9.1  9.0  --      Lab Results   Component Value Date/Time    MG 2.2 01/15/2023 05:46 AM     Recent Labs     01/17/23  0432 01/19/23  1102   ALKPHOS 67 75  78   ALT 22 33  34   AST 51* 51*  54*   PROT 5.9* 5.9*  5.9*   BILITOT 0.9 1.2  1.2   LABALBU 3.4* 3.4*  3.4*     Recent Labs     01/19/23  1102   WBC 11.2  11.8*   RBC 5.54  5.48   HGB 17.2*  17.1*   HCT 51.9  51.4   MCV 93.7  93.8   MCH 31.0  31.2   MCHC 33.1  33.3   RDW 18.2*  17.3*     151   MPV 10.6  10.6       Lab Results   Component Value Date    TROPONINI <0.01 08/29/2016     No results found for: INR, PROTIME  Lab Results   Component Value Date    TSH 0.939 08/30/2016     No results found for: LABA1C  No results found for: EAG  No results found for: CHOL  No results found for: TRIG  No results found for: HDL  No results found for: LDLCALC, LDLCHOLESTEROL  No results found for: LABVLDL, VLDL  No results found for: ANUMYOGICOLLEENCELIA  Recent Labs     23  1102   PROBNP 25,864*         Cardiac Tests:  EK2023: Sinus rhythm with PACs and PVCs. RBBB QRS duration 130 ms    Telemetry: Sinus rhythm 70s with PACs and frequent PVCs and couplets    Chest X-ray:   23       Impression   No acute process. CTA chest  Personally reviewed. Moderate seems like an overcall on these effusions    Impression   No evidence of pulmonary embolism. Mild interlobular septal thickening, which could suggest slight pulmonary   edema. Moderate bilateral pleural effusions. Clinical and laboratory   correlation recommended. Echocardiogram:   TTE 2022 [facility]   Summary   Top normal left ventricle size. Ejection fraction is visually estimated at 45%. Mild to moderate centrally directed mitral insufficiency   Severe aortic stenosis. Mild aortic regurgitation. Normal tricuspid valve structure and function. RVSP is 45 mmHg. Pulmonary hypertension is mild . Stress test:    Pharm stress 22  Gated SPECT left ventricular ejection fraction was calculated to be 36%, with global hypokinesis in the absence of regional wall motion abnormalities. Impression:    Electrocardiographically normal regadenoson infusion with a clinically nonischemic response. Myocardial perfusion imaging was normal.    Overall left ventricular systolic function was  mild to moderately impaired with global hypokinesis and no regional wall motion abnormalities with a dilated left ventricular chamber . 4. Intermediate risk pharmacologic stress test    Cardiac catheterization:   10/2002 2.75 x12, 2.75 x 8 [facility] -  OM2 on 10/2/2002 - Dr Laura Kessler    -------------------------------------------------------------------------------------------------------------------------------------------------------------  IMPRESSION:  Acute on chronic heart failure with reduced ejection fraction.   proBNP 14,000  Minimally elevated hs-cTnT: 50-44-48 ng/L flat pattern likely chronic myocardial injury. No evidence of ACS  Frequent PVCs  Cardiomyopathy EF 40-45% by echo, 36% by SPECT. Mixed ischemic, valvular  Severe aortic stenosis with mild AR, mild to moderate MR, mild TR  Coronary artery disease PCI to OM 2002  RBBB  COVID-19 pneumonia  Acute hypoxic respiratory failure  Bilateral pleural effusions  Hypertension  COPD  Hyperlipidemia    RECOMMENDATIONS:  Apparently patient has close follow-up with [facility] and being considered for TAVR at the [facility]. Chart record shows patient will be discharged and once discharged and follow-up with the [facility] cardiac center for a plan for TAVR. Follow-up with your cardiologist in the outpatient with [facility] for an ischemic and TAVR evaluation. Continue on cardiac medications as blood pressure allows   Avoid hypotension given severe aortic stenosis and discussed   Initiate low-dose Entresto if there is no contraindication and blood pressure can allow without hypotension         Thank you for allowing me to participate in your patient's care. Please feel free to contact me if you have any questions or concerns.     Rigo Thompson MD  [address] Cardiology

## 2023-01-20 NOTE — CARE COORDINATION
Call made to [facility] at [facility] (skilled nursing facility in [place] [phone]) to check the status of auth, no answer; left a message for [facility] to return the call. Patient in restraints overnight, restraints removed this morning. Patient continues on 6L NC, IV Zosyn Q8 and IV Decadron; pulmonology following,. Plan is for patient to d/c to the [facility] when medically cleared. Spoke to patient's nephew/POA, Marjorie Muñiz and provided update. He states he has not spoken to a doctor at all regarding his uncle since admission and is requesting a call. He states he will be taking his uncle out of the hospital on Tuesday if he has not been discharged; for his appt 1/25 at the [facility]. Perfect serve message sent to attending. Ambulance form completed, Pasrr completed and envelope placed on the soft chart. Ambulance transport placed on will-call for [facility].     Charlee Chung, MSW, LSW [phone]

## 2023-01-20 NOTE — PROGRESS NOTES
Patient pulse ox in the low 70's, unable to keep oxygen on patient as he keeps pulling it off. Patient placed in bilateral wrists restraints. Attending physician notified. Order placed

## 2023-01-20 NOTE — PROGRESS NOTES
Contacted patient Pablo Haynes to see if he was able to come and sit with patient at bedside to see if this would help to decrease patients agitation. Corrie Kelly states he lives over an hour and a half away and would not be able to come in at this time at night.

## 2023-01-20 NOTE — PROGRESS NOTES
Pts family member brought in pasta for pt to eat. Pt seemed to be in distress, I asked family to slow down feeding pt or I could give him smaller bites. Family agreed, another nurse witnesses family still feeding distressed pt minutes later. All safety measures in place.
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
## 2023-01-20 NOTE — PROGRESS NOTES
[facility]  Internal medicine  Progress Notes      CHIEF COMPLAINT: Shortness of breath      HISTORY OF PRESENT ILLNESS:    1/15/2023  Patient was seen on the floor earlier this morning at the main campus of [facility]  He was in isolation room due to Oliver James test being positive  He was a poor historian  Registered nurse at bedside  75-year-old  man being evaluated for TAVR at [facility] clinic for severe aortic stenosis  He claims that for the last 1 month he has not felt well with worsening shortness of breath  He does not know if he had fever or chills  Tested positive for COVID  Was in CHF  Responded very well to 1 dose of IV Lasix  Admitted for further management  1/16/2023  Patient was seen on the floor earlier this morning  Overnight combativeness reported  He was pulling IVs out and wanted to go home  This morning he is fully oriented and wants to go home  Not requiring any supplemental oxygen  No dyspnea or acute distress  1/17/2023  Patient was seen on the floor earlier this morning  Not doing well  Discharge was canceled yesterday  Currently in restraints due to combativeness and pulling on lines  On supplemental oxygen  1/18/2023  Patient was seen on the floor earlier this morning  He was in isolation room due to COVID-positive test  Requiring restraints due to agitation  On supplemental oxygen  Chest x-ray showing infiltrates  1/19/2023  Patient was seen on the floor earlier this morning  Spoke with pulmonary  Remains agitated  1/20/2023  Patient was seen on the floor earlier this morning  Overnight he was combative and agitated  Overall he is doing poorly  Past Medical History:    Past Medical History:   Diagnosis Date    Asthma     MILD, no issues    BPH (benign prostatic hypertrophy)     CAD (coronary artery disease)     follows with Dr Doyle Gann.     COPD (chronic obstructive pulmonary disease) ([facility]     Glaucoma     patient unsure    Hyperlipidemia     Hypertension Preoperative clearance 10/10/2017    cardiac, Dr Mariaa Barney. Retention of urine     Valvular heart disease     MV insufficiency, tricuspid regurgitation         Past Surgical History:    Past Surgical History:   Procedure Laterality Date    CATARACT REMOVAL  11/10 & 1/11    LEFT & RIGHT    COLONOSCOPY  11/7/2011    DIVERTICULOSIS    CORONARY ANGIOPLASTY  10/02/02    2 STENTS OF OM2 WITH GOOD RESULTS. DIAGNOSTIC CARDIAC CATH LAB PROCEDURE  10/02/02    DONE SECONDARY TO RECURRENT CP & MILD ISCHEMIA OF ANTEROLATERAL WALL ON NUCLEAR, SHOWD 30-40% 2ND DIAGONAL STENOSIS.  20-30% MID LAD STENSOIS. 90% MID OM2 STENOSIS. PATENT RCA. NORMAL LV FUNCTION W/EF MORE THAN 55%. EYE SURGERY      HIP ARTHROPLASTY Right 11/07/2017    SKIN BIOPSY      TONSILLECTOMY      TUMOR EXCISION  4 YRS AGO    RIGHT EAR       Medications Prior to Admission:    Medications Prior to Admission: Rosuvastatin Calcium 20 MG CPSP, Take by mouth  acetaminophen (TYLENOL) 325 MG tablet, Take 2 tablets by mouth every 6 hours as needed for Pain  Multiple Vitamins-Minerals (OCUVITE) TABS oral tablet, Take 1 tablet by mouth daily  tamsulosin (FLOMAX) 0.4 MG capsule, Take 0.4 mg by mouth daily. Multiple Vitamins-Minerals (CENTRUM SILVER ADULT 50+) TABS, Take 1 tablet by mouth daily. finasteride (PROSCAR) 5 MG tablet, Take 5 mg by mouth daily. [DISCONTINUED] metoprolol (TOPROL-XL) 25 MG XL tablet, Take 12.5 mg by mouth nightly    Allergies:    Patient has no known allergies. Social History:    reports that he has quit smoking. His smoking use included cigarettes. He has a 30.00 pack-year smoking history. He has never used smokeless tobacco. He reports current alcohol use. He reports that he does not use drugs. Family History:   family history includes Cancer in his brother and sister; Hypertension in his sister.     REVIEW OF SYSTEMS:  As above in the HPI, otherwise negative    PHYSICAL EXAM:    Vitals:  /84   Pulse 94   Temp (!) 96.4 °F (35.8 °C) (Axillary)   Resp 21   Ht 5' 4\" (1.626 m)   Wt 145 lb (65.8 kg)   SpO2 97%   BMI 24.89 kg/m²     General:  Awake, alert, oriented X 3. Somewhat confused  And combative  HEENT:  Normocephalic, atraumatic. Pupils equal, round, reactive to light. No scleral icterus. No conjunctival injection. No nasal discharge. Oral mucosa is dry neck:  Supple  Heart:  RRR, no murmurs, gallops, rubs  Lungs: Diffuse wheezes noted  Abdomen:   Bowel sounds present, soft, nontender, no masses, no organomegaly, no peritoneal signs  Extremities:  No clubbing, cyanosis, or edema  Skin:  Warm and dry, no open lesions or rash  Neuro:  Cranial nerves 2-12 intact, no focal deficits  Generalized muscle atrophy noted  Breast: deferred  Rectal: deferred  Genitalia:  deferred    LABS:  Data reviewed      ASSESSMENT:    Overall he is doing poorly  Appears dehydrated clinically   bronchospastic this morning  Suspected aspiration  Principal Problem:    Acute respiratory failure with hypoxia (HCC)  COVID-positive test  Acute CHF/well compensated now  Severe aortic stenosis  Remote coronary artery disease      PLAN:  Palliative care consult  IV hydration   Zosyn    DuoNeb  Continue steroids  Resume other home medications    Lin Murray MD  11:50 AM  1/20/2023

## 2023-01-20 NOTE — CONSULTS
Palliative Care Department  [phone]  Palliative Care Initial Consult  Provider Marleny Puentes, MARGOT - AMADA     Ricky Patel  [id number]  Hospital Day: 7  Date of Initial Consult: 1/20/23  Referring Provider: Dr. Cassidy  Palliative Medicine was consulted for assistance with: Goals of care, CODE STATUS discussion    HPI:   Ricky Patel is a 89 y.o. with a medical history of asthma, COPD, CAD, BPH, valvular heart disease, HTN, HLD, who was admitted on 1/14/2023 from home with a CHIEF COMPLAINT of shortness of breath.  Patient was being evaluated for TAVR at [facility] for severe aortic stenosis.  ED work-up significant for: proBNP 14,442, troponin 50-> 44, hemoglobin 16.9, D-dimer 568, COVID-positive.  EKG with sinus rhythm with PVCs.  CXR showed no acute process.  CTA pulmonary negative pulmonary embolism, mild intralobular septal thickening which could suggest pulmonary edema, moderate bilateral pleural effusions.  Patient found to be hypoxic and has been requiring oxygen therapy, currently on 6 L nasal cannula.  1/19 CT head showed no acute intracranial abnormality.  CT chest showed diffuse bilateral patchy groundglass infiltrates and pleural effusions likely CHF/edema and/or diffuse pneumonia, cardiomegaly and coronary artery calcification.    ASSESSMENT/PLAN:     Pertinent Hospital Diagnoses     Acute respiratory failure with hypoxia  COVID-19  HFrEF  Severe aortic stenosis  Delirium    Palliative Care Encounter / Counseling Regarding Goals of Care  Please see detailed goals of care discussion as below  At this time, Ricky Patel, Does Not have capacity for medical decision-making.  Capacity is time limited and situation/question specific  During encounter Kaz was surrogate medical decision-maker  Outcome of goals of care meeting:   DC to the [facility] in [place]  Follow up at [facility] for TAVR evaluation  Change code status to DNRCCA  Code status DNR-CCA  Advanced Directives: no POA or  living will in [facility]  Surrogate/Legal NOK:  Kaz Patel (nephew): [phone]    Spiritual assessment: no spiritual distress identified  Bereavement and grief: to be determined  Referrals to: none today  SUBJECTIVE:     Current medical issues leading to Palliative Medicine involvement include   Active Hospital Problems    Diagnosis Date Noted    Acute respiratory failure with hypoxia (HCC) [J96.01] 01/15/2023     Priority: Medium    RBBB [I45.10] 01/15/2023     Priority: Medium    Frequent PVCs [I49.3] 01/15/2023     Priority: Medium       Details of Conversation:    Chart reviewed.  Patient remains confused.  Patient unable to partake in meaningful conversation and unable to make decisions for himself.  Nephew, Kaz called.  Introduced self and role of palliative medicine.  Kaz states patient has a living will and that he is patient's HPOA.  He has documents to provide.  Introduced self and role of palliative medicine.  Discussed patient's current condition and comorbidities.  Kaz states that patient has been doing well, living on his own at home until the last few weeks he has noticed a decline.  He states patient is being evaluated on Wednesday for a TAVR procedure at [facility].  He is aware that patient has severe aortic stenosis but feels if patient is able to have surgery that he will have somewhat a meaningful recovery.  Discuss that during hospitalization, patient has been confused and combative making it difficult to treat patient.  Kaz understands.  Discussed goals of care and CODE STATUS.  Explained full CODE STATUS.  Kaz states that if patient heart stops that patient would not want to be resuscitated and would want to die a natural death.  Although, Kaz wishes for patient to go to [facility] for testing and obtain their recommendations and prognosis.  Kaz is in agreement to changing CODE STATUS to DNR CCA at this time.  He is hopeful that patient can discharge to [facility]  in [facility] before Wednesday. [facility] states if patient is not stable for discharge before Wednesday then he may have to sign patient out [address] as he really wants patient to be seen by [facility] clinic. Patient is currently on 6 L O2 via nasal cannula. Educated [facility] that patient may not be stable enough to go through testing and surgical procedure at current state as he remains on oxygen, is COVID-19 positive, and experiencing delirium. All questions and concerns addressed. Palliative medicine to follow. OBJECTIVE:   Prognosis: Guarded  Guarded  ROS:   JAKE due to mentation    Physical Exam:  /84   Pulse 94   Temp (!) 96.4 °F (35.8 °C) (Axillary)   Resp 21   Ht 5' 4\" (1.626 m)   Wt 145 lb (65.8 kg)   SpO2 97%   BMI 24.89 kg/m²   Due to the current efforts to prevent transmission of COVID-19 and also the need to preserve PPE for other caregivers, a face-to-face encounter with the patient was not performed. That being said, all relevant records and diagnostic tests were reviewed, including laboratory results and imaging. Please reference any relevant documentation elsewhere. Care will be coordinated with the primary service and other specialties as appropriate. Objective data reviewed: labs, images, records, medication use, vitals, and chart    Discussed patient and the plan of care with the other IDT members: Palliative Medicine IDT Team and Family    Time/Communication  Greater than 50% of time spent, total 75 minutes in counseling and coordination of care at the bedside regarding  CODE STATUS discussion, goals of care, and diagnosis and prognosis. Thank you for allowing Palliative Medicine to participate in the care of Tony Dodd.
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
## 2023-01-20 NOTE — PROGRESS NOTES
INPATIENT CARDIOLOGY CONSULT    Name: Trang Jeffrey    Age: 80 y.o. Date of Admission: 1/14/2023  5:57 PM    Date of Service: 1/20/2023    Reason for Consultation: CHF, AS    Referring Physician: Freda Wilson MD    Primary Cardiologist: Rita Bro    Interim:  Visit done remotely due to ongoing treatment for COVID-19. CODE STATUS now documented to be DNR CCA. Apparently has appointment with the [facility] on January 25, 2023    It is reported  patient has close follow-up with [facility] and being considered for TAVR at the [facility]. Chart record shows patient will be discharged and once discharged and follow-up with the [facility] cardiac center for a plan for TAVR. Past Medical History:  Past Medical History:   Diagnosis Date    Asthma     MILD, no issues    BPH (benign prostatic hypertrophy)     CAD (coronary artery disease)     follows with Dr Latanya Callahan. COPD (chronic obstructive pulmonary disease) ([facility] [address]     Glaucoma     patient unsure    Hyperlipidemia     Hypertension     Preoperative clearance 10/10/2017    cardiac, Dr Latanya Callahan. Retention of urine     Valvular heart disease     MV insufficiency, tricuspid regurgitation         Past Surgical History:  Past Surgical History:   Procedure Laterality Date    CATARACT REMOVAL  11/10 & 1/11    LEFT & RIGHT    COLONOSCOPY  11/7/2011    DIVERTICULOSIS    CORONARY ANGIOPLASTY  10/02/02    2 STENTS OF OM2 WITH GOOD RESULTS. DIAGNOSTIC CARDIAC CATH LAB PROCEDURE  10/02/02    DONE SECONDARY TO RECURRENT CP & MILD ISCHEMIA OF ANTEROLATERAL WALL ON NUCLEAR, SHOWD 30-40% 2ND DIAGONAL STENOSIS.  20-30% MID LAD STENSOIS. 90% MID OM2 STENOSIS. PATENT RCA. NORMAL LV FUNCTION W/EF MORE THAN 55%.     EYE SURGERY      HIP ARTHROPLASTY Right 11/07/2017    SKIN BIOPSY      TONSILLECTOMY      TUMOR EXCISION  4 YRS AGO    RIGHT EAR       Family History:  Family History   Problem Relation Age of Onset    Hypertension Sister     Cancer Brother     Cancer Sister        Social History:  Social History     Tobacco Use    Smoking status: Former     Packs/day: 2.00     Years: 15.00     Pack years: 30.00     Types: Cigarettes    Smokeless tobacco: Never    Tobacco comments:     quit 45 years ago   Vaping Use    Vaping Use: Never used   Substance Use Topics    Alcohol use: Yes     Comment: Socially/ coffee daily     Drug use: No       Allergies:  No Known Allergies    Home Medications:  Prior to Admission medications    Medication Sig Start Date End Date Taking? Authorizing Provider   aspirin 81 MG EC tablet Take 1 tablet by mouth daily DVT prophylaxis following hip replacement x 30 days 1/16/23  Yes Deedee Damico MD   metoprolol succinate (TOPROL XL) 25 MG extended release tablet Take 1 tablet by mouth daily 1/17/23  Yes Deedee Damico MD   furosemide (LASIX) 20 MG tablet Take 1 tablet by mouth three times a week 1/16/23  Yes Deedee Damico MD   Rosuvastatin Calcium 20 MG CPSP Take by mouth   Yes Historical Provider, MD   acetaminophen (TYLENOL) 325 MG tablet Take 2 tablets by mouth every 6 hours as needed for Pain 11/9/17 11/30/22  Oleksandr Anna MD   Multiple Vitamins-Minerals (OCUVITE) TABS oral tablet Take 1 tablet by mouth daily    Historical Provider, MD   tamsulosin (FLOMAX) 0.4 MG capsule Take 0.4 mg by mouth daily. 12/15/14   Historical Provider, MD   Multiple Vitamins-Minerals (CENTRUM SILVER ADULT 50+) TABS Take 1 tablet by mouth daily. Historical Provider, MD   finasteride (PROSCAR) 5 MG tablet Take 5 mg by mouth daily.       Historical Provider, MD       Current Medications:    Current Facility-Administered Medications:     piperacillin-tazobactam (ZOSYN) 4,500 mg in dextrose 5 % 100 mL IVPB (Bsdd2Vat), 4,500 mg, IntraVENous, Q8H, Deedee Damico MD, Stopped at 01/20/23 1705    ipratropium-albuterol (DUONEB) nebulizer solution 1 ampule, 1 ampule, Inhalation, 4x daily, Deedee Damico MD, 1 ampule at 01/18/23 0920    haloperidol lactate (HALDOL) injection 2 mg, 2 mg, IntraMUSCular, Q6H PRN, Haily Spears MD, 2 mg at 01/20/23 1328    acetaminophen (TYLENOL) tablet 650 mg, 650 mg, Oral, Q6H PRN, Haily Spears MD, 650 mg at 01/20/23 0950    aspirin EC tablet 81 mg, 81 mg, Oral, Daily, Haily Spears MD, 81 mg at 01/20/23 0849    finasteride (PROSCAR) tablet 5 mg, 5 mg, Oral, Daily, Haily Spears MD, 5 mg at 01/20/23 0845    ocuvite-lutein multivitamin 1 capsule, 1 capsule, Oral, Daily, Haily Spears MD, 1 capsule at 01/20/23 0844    enoxaparin (LOVENOX) injection 40 mg, 40 mg, SubCUTAneous, Daily, Haily Spears MD, 40 mg at 01/20/23 0844    melatonin tablet 3 mg, 3 mg, Oral, Nightly PRN, Haily Spears MD, 3 mg at 01/19/23 1939    potassium chloride (KLOR-CON M) extended release tablet 20 mEq, 20 mEq, Oral, Daily with breakfast, Haily Spears MD, 20 mEq at 01/20/23 0844    atorvastatin (LIPITOR) tablet 40 mg, 40 mg, Oral, Nightly, Ginger Shepherd MD, 40 mg at 01/19/23 1940    tamsulosin (FLOMAX) capsule 0.4 mg, 0.4 mg, Oral, QHS, Ginger Shepherd MD, 0.4 mg at 01/19/23 1939    metoprolol succinate (TOPROL XL) extended release tablet 25 mg, 25 mg, Oral, Daily, Ginger Shepherd MD, 25 mg at 01/20/23 0850    dexamethasone (DECADRON) injection 6 mg, 6 mg, IntraVENous, Q24H, Haily Spears MD, 6 mg at 01/19/23 1940    Physical Exam:  /83   Pulse 56   Temp 97.3 °F (36.3 °C) (Axillary)   Resp 21   Ht 5' 4\" (1.626 m)   Wt 145 lb (65.8 kg)   SpO2 100%   BMI 24.89 kg/m²   Wt Readings from Last 3 Encounters:   01/14/23 145 lb (65.8 kg)   12/15/22 145 lb (65.8 kg)   12/07/22 141 lb (64 kg)     Appearance: Elderly male, awake, alert, appears dyspneic, on nasal cannula  Skin: Intact, no rash  Head: Normocephalic, atraumatic  Eyes: EOMI, no conjunctival erythema  ENMT: No pharyngeal erythema, MMM, no rhinorrhea  Neck: Supple, mildly elevated JVP, no carotid bruits  Lungs: Diminished, few basilar rales  Cardiac: PMI nondisplaced, regular rhythm with a normal rate, frequent ectopy, normal S1 & S2, systolic murmur difficult to characterize given heavy breathing and inability to hold his breath for more than a second  Abdomen: Soft, nontender, +bowel sounds  Extremities: Moves all extremities x 4, 1+ dependent lower extremity edema  Neurologic: No focal motor deficits apparent, normal mood and affect  Peripheral Pulses: Intact posterior tibial pulses bilaterally    Intake/Output:  No intake or output data in the 24 hours ending 23 180    No intake/output data recorded. Laboratory Tests:  Recent Labs     23  1102 23  1404 23  0831     144  --  142   K 4.1  4.1 3.77 4.0     105  --  97*   CO2 26  26  --  23   BUN 29*  29*  --  31*   CREATININE 0.7  0.7  --  0.7   GLUCOSE 67*  66*  --  347*   CALCIUM 9.1  9.0  --  8.2*     Lab Results   Component Value Date/Time    MG 2.2 01/15/2023 05:46 AM     Recent Labs     23  1102 23  0831   ALKPHOS 75  78 67   ALT 33  34 35   AST 51*  54* 43*   PROT 5.9*  5.9* 6.2*   BILITOT 1.2  1.2 1.2   LABALBU 3.4*  3.4* 3.2*     Recent Labs     23  1102 23  0831   WBC 11.2  11.8* 7.2   RBC 5.54  5.48 5.11   HGB 17.2*  17.1* 16.3   HCT 51.9  51.4 48.5   MCV 93.7  93.8 94.9   MCH 31.0  31.2 31.9   MCHC 33.1  33.3 33.6   RDW 18.2*  17.3* 16.7*     151 159   MPV 10.6  10.6 10.6       Lab Results   Component Value Date    TROPONINI <0.01 2016     No results found for: INR, PROTIME  Lab Results   Component Value Date    TSH 0.939 2016     No results found for: LABA1C  No results found for: EAG  No results found for: CHOL  No results found for: TRIG  No results found for: HDL  No results found for: LDLCALC, LDLCHOLESTEROL  No results found for: LABVLDL, VLDL  No results found for: CHOLHDLRATIO  Recent Labs     23  1102   PROBNP 25,864*         Cardiac Tests:  EK2023: Sinus rhythm with PACs and PVCs.   RBBB QRS duration 130 ms    Telemetry: Sinus rhythm 70s with PACs and frequent PVCs and couplets    Chest X-ray:   1/14/23       Impression   No acute process. CTA chest  Personally reviewed. Moderate seems like an overcall on these effusions    Impression   No evidence of pulmonary embolism. Mild interlobular septal thickening, which could suggest slight pulmonary   edema. Moderate bilateral pleural effusions. Clinical and laboratory   correlation recommended. Echocardiogram:   TTE 12/2022 [facility]   Summary   Top normal left ventricle size. Ejection fraction is visually estimated at 45%. Mild to moderate centrally directed mitral insufficiency   Severe aortic stenosis. Mild aortic regurgitation. Normal tricuspid valve structure and function. RVSP is 45 mmHg. Pulmonary hypertension is mild . Stress test:    Pharm stress 12/7/22  Gated SPECT left ventricular ejection fraction was calculated to be 36%, with global hypokinesis in the absence of regional wall motion abnormalities. Impression:    Electrocardiographically normal regadenoson infusion with a clinically nonischemic response. Myocardial perfusion imaging was normal.    Overall left ventricular systolic function was  mild to moderately impaired with global hypokinesis and no regional wall motion abnormalities with a dilated left ventricular chamber . 4. Intermediate risk pharmacologic stress test    Cardiac catheterization:   10/2002 2.75 x12, 2.75 x 8 [facility] -  OM2 on 10/2/2002 -  [facility]    -------------------------------------------------------------------------------------------------------------------------------------------------------------  IMPRESSION:  Acute on chronic heart failure with reduced ejection fraction. proBNP 14,000  Minimally elevated hs-cTnT: 50-44-48 ng/L flat pattern likely chronic myocardial injury. No evidence of ACS  Frequent PVCs  Cardiomyopathy EF 40-45% by echo, 36% by SPECT.   Mixed ischemic, valvular  Severe aortic stenosis with mild AR, mild to moderate MR, mild TR  Coronary artery disease PCI to OM 2002  RBBB  COVID-19 pneumonia  Acute hypoxic respiratory failure  Bilateral pleural effusions  Hypertension  COPD  Hyperlipidemia    RECOMMENDATIONS:  Apparently patient has close follow-up with [facility] and being considered for TAVR at the [facility]. Apparently has appointment with the [facility] clinic on January 25, 2023      Follow-up with your cardiologist in the outpatient with [facility] for an ischemic and TAVR evaluation. Continue on cardiac medications as blood pressure allows   Avoid hypotension given severe aortic stenosis and discussed   Initiate low-dose Entresto if there is no contraindication and blood pressure can allow without hypotension         Thank you for allowing me to participate in your patient's care. Please feel free to contact me if you have any questions or concerns.     Yovanny Rucker MD  [facility]) Cardiology

## 2023-01-20 NOTE — PROGRESS NOTES
[place]  Department of Internal Medicine  Division of Pulmonary, Critical Care and Sleep Medicine  Consult Note    Anna Carr DO, MPH, FCCP, FACOI, FACP  Pilar Hernandez DO, FCCP  MD Beba Miller, APRN-CNP       SUBJECTIVE:    Mr. Sheridan Vicente was seen and examined in his bed. He is more awake and alert. He has been combative with staff, currently in soft wrist restraints. Continues to require supplemental o2. OBJECTIVE:    CONSTITUTIONAL:   Ill-appearing, difficult to arouse  SKIN:     No rash, No suspicious lesions, No skin discoloration  HEENT:     EOMI, MMM, No thrush  NECK:    No bruits, No JVP appreciated  CV:      2 out of 6 systolic murmur, regular. PULMONARY:   Few crackles in bases. Otherwise clear  ABDOMEN:     Soft, non-tender. BS normal. No R/R/G  EXT:    No deformities . No clubbing. No lower extremity edema, No venous stasis  PULSE:   Appears equal and palpable.   PSYCHIATRIC:  Unable to assess due to altered mental status  MS:    No fractures, No gross weakness  NEUROLOGIC:   Withdraws from tactile stimuli     DATA: IMAGING & TESTING:     LABORATORY TESTS:    CBC with Differential:    Lab Results   Component Value Date/Time    WBC 7.2 01/20/2023 08:31 AM    RBC 5.11 01/20/2023 08:31 AM    HGB 16.3 01/20/2023 08:31 AM    HCT 48.5 01/20/2023 08:31 AM     01/20/2023 08:31 AM    MCV 94.9 01/20/2023 08:31 AM    MCH 31.9 01/20/2023 08:31 AM    MCHC 33.6 01/20/2023 08:31 AM    RDW 16.7 01/20/2023 08:31 AM    BANDSPCT 3 08/29/2016 07:14 PM    METASPCT 1 08/29/2016 07:14 PM    LYMPHOPCT 7.1 01/19/2023 11:02 AM    LYMPHOPCT 6.9 01/19/2023 11:02 AM    MONOPCT 9.7 01/19/2023 11:02 AM    MONOPCT 10.2 01/19/2023 11:02 AM    MYELOPCT 2 08/29/2016 07:14 PM    BASOPCT 0.1 01/19/2023 11:02 AM    BASOPCT 0.1 01/19/2023 11:02 AM    MONOSABS 1.14 01/19/2023 11:02 AM    MONOSABS 1.15 01/19/2023 11:02 AM    LYMPHSABS 0.84 01/19/2023 11:02 AM    LYMPHSABS 0.77 01/19/2023 11:02 AM    EOSABS 0.02 01/19/2023 11:02 AM    EOSABS 0.01 01/19/2023 11:02 AM    BASOSABS 0.01 01/19/2023 11:02 AM    BASOSABS 0.01 01/19/2023 11:02 AM     CMP:    Lab Results   Component Value Date/Time     01/20/2023 08:31 AM    K 4.0 01/20/2023 08:31 AM    K 3.6 01/14/2023 06:30 PM    CL 97 01/20/2023 08:31 AM    CO2 23 01/20/2023 08:31 AM    BUN 31 01/20/2023 08:31 AM    CREATININE 0.7 01/20/2023 08:31 AM    GFRAA >60 06/10/2022 05:03 PM    LABGLOM >60 01/20/2023 08:31 AM    GLUCOSE 347 01/20/2023 08:31 AM    PROT 6.2 01/20/2023 08:31 AM    LABALBU 3.2 01/20/2023 08:31 AM    CALCIUM 8.2 01/20/2023 08:31 AM    BILITOT 1.2 01/20/2023 08:31 AM    ALKPHOS 67 01/20/2023 08:31 AM    AST 43 01/20/2023 08:31 AM    ALT 35 01/20/2023 08:31 AM        PRO-BNP:   Lab Results   Component Value Date    PROBNP 25,864 (H) 01/19/2023    PROBNP 14,442 (H) 01/14/2023      ABGs:   Lab Results   Component Value Date/Time    PH 7.440 01/19/2023 02:04 PM    PO2 79.9 01/19/2023 02:04 PM    PCO2 34.0 01/19/2023 02:04 PM     Hemoglobin A1C: No components found for: HGBA1C    IMAGING:  Imaging tests were completed and reviewed and discussed radiology and care team involved and reveals   XR CHEST PORTABLE    Result Date: 1/14/2023  EXAMINATION: ONE XRAY VIEW OF THE CHEST 1/14/2023 7:35 pm COMPARISON: 06/10/2022 HISTORY: ORDERING SYSTEM PROVIDED HISTORY: short of breath TECHNOLOGIST PROVIDED HISTORY: Reason for exam:->short of breath What reading provider will be dictating this exam?->CRC FINDINGS: The lungs are without acute focal process. There is no effusion or pneumothorax. Cardiomegaly. The osseous structures are without acute process. No acute process.      XR CHEST 1 VIEW    Result Date: 1/17/2023  EXAMINATION: ONE XRAY VIEW OF THE CHEST 1/17/2023 11:52 am COMPARISON: 01/14/2023 chest radiograph and CTA chest. HISTORY: ORDERING SYSTEM PROVIDED HISTORY: hypoxia TECHNOLOGIST PROVIDED HISTORY: Reason for exam:->hypoxia What reading provider will be dictating this exam?->CRC FINDINGS: There are interstitial and patchy airspace densities throughout the lungs. There is probable blunting of right costophrenic sulcus. Cardiac silhouette is enlarged. Prominent right paratracheal soft tissues is probably due to rotation of the current study. There is atherosclerotic calcification of the thoracic aorta. There is moderate thoracic spondylosis. There are multiple remote left lateral rib fractures. No pneumothorax. Bilateral lung infiltrates consistent with pulmonary edema and or pneumonia. Probable small right pleural effusion. Stable enlargement of the cardiac silhouette. CTA PULMONARY W CONTRAST    Result Date: 1/14/2023  EXAMINATION: CTA OF THE CHEST 1/14/2023 10:17 pm TECHNIQUE: CTA of the chest was performed after the administration of intravenous contrast.  Multiplanar reformatted images are provided for review. MIP images are provided for review. Automated exposure control, iterative reconstruction, and/or weight based adjustment of the mA/kV was utilized to reduce the radiation dose to as low as reasonably achievable. COMPARISON: None. HISTORY: ORDERING SYSTEM PROVIDED HISTORY: r/o pe TECHNOLOGIST PROVIDED HISTORY: Reason for exam:->r/o pe Decision Support Exception - unselect if not a suspected or confirmed emergency medical condition->Emergency Medical Condition (MA) What reading provider will be dictating this exam?->CRC FINDINGS: Pulmonary Arteries: Pulmonary arteries are adequately opacified for evaluation. No evidence of intraluminal filling defect to suggest pulmonary embolism. Main pulmonary artery is normal in caliber. Mediastinum: No evidence of mediastinal lymphadenopathy. The heart and pericardium demonstrate no acute abnormality. There is no acute abnormality of the thoracic aorta. Lungs/pleura: The lungs are without acute process.   There is mild bilateral peribronchial thickening. No focal consolidation. There is mild interlobular septal thickening. No evidence of pneumothorax. Moderate bilateral pleural effusions. Upper Abdomen: Limited images of the upper abdomen are unremarkable. Soft Tissues/Bones: No acute bone or soft tissue abnormality. No evidence of pulmonary embolism. Mild interlobular septal thickening, which could suggest slight pulmonary edema. Moderate bilateral pleural effusions. Clinical and laboratory correlation recommended. CT chest January 19, 2022   Limited study. Diffuse bilateral patchy and ground-glass infiltrates and pleural effusions   likely CHF/edema and or diffuse pneumonia. Cardiomegaly and coronary artery calcification. HISTORY OF PRESENT ILLNESS : Juan Burgos 80 y.o. male was seen in consultation regarding the above chief compliant. Patient presented to the emergency room regarding shortness of breath. He did have a positive COVID test.  Unfortunately he is a poor historian. At time of my examination he is quite drowsy and is not oriented. He has apparently been quite agitated intermittently. Unfortunately I am unsure of his baseline mentation.       Assessment:     Acute hypoxemic respiratory failure, continues to require supplemental oxygen  COVID-19 positive  Concern for aspiration pneumonia, piperacillin tazobactam.  Obtain swallow evaluation  Delirium with altered mental status, more alert today continues to remain confused and combative at times  HFrEF 40-45%, Elevated proBNP, 14,442>> 25,860  Severe aortic stenosis, plan is for TAVR in [facility] [employer]  Suspect sleep apnea, may need cpap while inpatient  Risk for aspiration  Poor oral intake d/t mental status, may require small bore feeding tube for nutrition        Plan:     Stop D5W due to increased blood glucose and elevated anion gap(22) and beta hydroxybutyrate (0.94)  Obtain swallow evaluation  ABGs reviewed-->7.44, 34.0, 79.9, 22.6-respiratory alkalosis   Repeat proBNP-->25, 864  CT Head reviewed- no acute abnormality  CT Chest-diffuse bilateral patchy and groundglass infiltrates and pleural effusions likely CHF/edema and/or diffuse pneumonia  Wean FiO2 as tolerated  Continue Decadron, once IV access is re-established  CRP 4.9--less than 7.5 Tocilizumab not indicated,   Continue breathing treatments ordered however patient is not currently able to cooperate with these  Delirium precautions  Recommend avoiding all sedating medications including pain medication and Haldol  Appreciate cardiology recommendations  Aspiration precautions, HOB elevated at least 30 degrees  Strict I&Os  Palliative care consult to discuss goals of care and clarify CODE STATUS    Case and plan discussed with Dr. Fransico De Leon and bedside RN  MARGOT Reyes - CNP        I saw and evaluated the patient and performed the MDM as documented in my note. Radiographs, labs and medication list were reviewed by me independently. I spoke with bedside nursing, therapists and consultants. The case was discussed in detail and plans for care were reviewed with Rowan FROST. . I provided the substantive portion of this visit by personally performing the exam and medical decision making component in its entirety. Review of CNP documentation was conducted and revisions were made as appropriate. I personally spoke with the patient's nephew at bedside. We did discuss the patient's altered mentation and that he had had increasing delirium over the last 24 to 48 hours. I discussed with him that transferring the patient to a nursing home at this time would be inappropriate due to patient's increased needs for care. ABG, CT head, CT chest, BMP, and CBC ordered. General: Patient is ill-appearing  HEENT: PERRL, EOMI. Mucous membranes dry  CV: Regular rate and rhythm, no murmurs rubs or gallops  Lungs: Patient with short periods of apnea.   Few scattered crackles in bases bilaterally. Does appear to use accessory muscles when agitated  Abdomen, soft, nontender, bowel sounds present all 4 quadrants  Extremities: Moves all extremities spontaneously. No edema. No cyanosis  Neuro: Patient is agitated to verbal and tactile stimuli. He does not follow any commands. He is noted to spontaneously move all extremities in the bed. Assessment:   Acute hypoxemic respiratory failure  COVID-19 positive  Delirium  Elevated proBNP, HFrEF  Severe aortic stenosis  Risk for aspiration  Protein calorie malnutrition  Poor oral intake           Plan:   Wean FiO2 as tolerated  Continue Decadron  CRP less than 7.5. Tocilizumab not indicated. Breathing treatments ordered however patient is not currently able to cooperate with these  Delirium precautions  Recommend avoiding all sedating medications including pain medication and Haldol  Agree with continuing Zosyn for possible aspiration. IV fluids started as per primary. Would also recommend small bore feeding tube for nutrition. CT of the head reviewed which shows no acute process. CT of chest also reviewed which shows diffuse patchy groundglass infiltrates bilaterally. Also small pleural effusions bilaterally. ABG reviewed  Continue aspiration precautions with head of bed elevated 30 degrees or more.      Eric Jewell DO

## 2023-01-21 LAB
ALBUMIN SERPL-MCNC: 3.2 G/DL (ref 3.5–5.2)
ALP BLD-CCNC: 66 U/L (ref 40–129)
ALT SERPL-CCNC: 34 U/L (ref 0–40)
ANION GAP SERPL CALCULATED.3IONS-SCNC: 10 MMOL/L (ref 7–16)
ANISOCYTOSIS: ABNORMAL
AST SERPL-CCNC: 38 U/L (ref 0–39)
BASOPHILS ABSOLUTE: 0.01 E9/L (ref 0–0.2)
BASOPHILS RELATIVE PERCENT: 0.1 % (ref 0–2)
BILIRUB SERPL-MCNC: 1.1 MG/DL (ref 0–1.2)
BUN BLDV-MCNC: 31 MG/DL (ref 6–23)
BURR CELLS: ABNORMAL
C-REACTIVE PROTEIN: 2 MG/DL (ref 0–0.4)
CALCIUM SERPL-MCNC: 8.7 MG/DL (ref 8.6–10.2)
CHLORIDE BLD-SCNC: 105 MMOL/L (ref 98–107)
CO2: 29 MMOL/L (ref 22–29)
CREAT SERPL-MCNC: 0.9 MG/DL (ref 0.7–1.2)
EOSINOPHILS ABSOLUTE: 0 E9/L (ref 0.05–0.5)
EOSINOPHILS RELATIVE PERCENT: 0 % (ref 0–6)
GFR SERPL CREATININE-BSD FRML MDRD: >60 ML/MIN/1.73
GLUCOSE BLD-MCNC: 125 MG/DL (ref 74–99)
HCT VFR BLD CALC: 49.7 % (ref 37–54)
HEMOGLOBIN: 16.3 G/DL (ref 12.5–16.5)
IMMATURE GRANULOCYTES #: 0.06 E9/L
IMMATURE GRANULOCYTES %: 0.7 % (ref 0–5)
LYMPHOCYTES ABSOLUTE: 0.41 E9/L (ref 1.5–4)
LYMPHOCYTES RELATIVE PERCENT: 4.6 % (ref 20–42)
MCH RBC QN AUTO: 31.8 PG (ref 26–35)
MCHC RBC AUTO-ENTMCNC: 32.8 % (ref 32–34.5)
MCV RBC AUTO: 96.9 FL (ref 80–99.9)
MONOCYTES ABSOLUTE: 0.42 E9/L (ref 0.1–0.95)
MONOCYTES RELATIVE PERCENT: 4.7 % (ref 2–12)
NEUTROPHILS ABSOLUTE: 8.01 E9/L (ref 1.8–7.3)
NEUTROPHILS RELATIVE PERCENT: 89.9 % (ref 43–80)
OVALOCYTES: ABNORMAL
PDW BLD-RTO: 17.3 FL (ref 11.5–15)
PLATELET # BLD: 172 E9/L (ref 130–450)
PMV BLD AUTO: 10.6 FL (ref 7–12)
POIKILOCYTES: ABNORMAL
POLYCHROMASIA: ABNORMAL
POTASSIUM SERPL-SCNC: 4.8 MMOL/L (ref 3.5–5)
PROCALCITONIN: 0.08 NG/ML (ref 0–0.08)
RBC # BLD: 5.13 E12/L (ref 3.8–5.8)
SODIUM BLD-SCNC: 144 MMOL/L (ref 132–146)
TOTAL PROTEIN: 5.8 G/DL (ref 6.4–8.3)
WBC # BLD: 8.9 E9/L (ref 4.5–11.5)

## 2023-01-21 PROCEDURE — 85025 COMPLETE CBC W/AUTO DIFF WBC: CPT

## 2023-01-21 PROCEDURE — 92610 EVALUATE SWALLOWING FUNCTION: CPT | Performed by: SPEECH-LANGUAGE PATHOLOGIST

## 2023-01-21 PROCEDURE — 2500000003 HC RX 250 WO HCPCS: Performed by: INTERNAL MEDICINE

## 2023-01-21 PROCEDURE — 2700000000 HC OXYGEN THERAPY PER DAY

## 2023-01-21 PROCEDURE — 6360000002 HC RX W HCPCS: Performed by: INTERNAL MEDICINE

## 2023-01-21 PROCEDURE — 2140000000 HC CCU INTERMEDIATE R&B

## 2023-01-21 PROCEDURE — 36415 COLL VENOUS BLD VENIPUNCTURE: CPT

## 2023-01-21 PROCEDURE — 2580000003 HC RX 258: Performed by: INTERNAL MEDICINE

## 2023-01-21 PROCEDURE — 6370000000 HC RX 637 (ALT 250 FOR IP): Performed by: INTERNAL MEDICINE

## 2023-01-21 PROCEDURE — 86140 C-REACTIVE PROTEIN: CPT

## 2023-01-21 PROCEDURE — 99232 SBSQ HOSP IP/OBS MODERATE 35: CPT | Performed by: INTERNAL MEDICINE

## 2023-01-21 PROCEDURE — 99233 SBSQ HOSP IP/OBS HIGH 50: CPT | Performed by: INTERNAL MEDICINE

## 2023-01-21 PROCEDURE — 84145 PROCALCITONIN (PCT): CPT

## 2023-01-21 PROCEDURE — 80053 COMPREHEN METABOLIC PANEL: CPT

## 2023-01-21 RX ORDER — BUMETANIDE 0.25 MG/ML
1 INJECTION, SOLUTION INTRAMUSCULAR; INTRAVENOUS ONCE
Status: COMPLETED | OUTPATIENT
Start: 2023-01-21 | End: 2023-01-21

## 2023-01-21 RX ADMIN — ENOXAPARIN SODIUM 40 MG: 100 INJECTION SUBCUTANEOUS at 10:13

## 2023-01-21 RX ADMIN — ATORVASTATIN CALCIUM 40 MG: 40 TABLET, FILM COATED ORAL at 20:49

## 2023-01-21 RX ADMIN — Medication 1 CAPSULE: at 10:13

## 2023-01-21 RX ADMIN — ASPIRIN 81 MG: 81 TABLET, COATED ORAL at 10:13

## 2023-01-21 RX ADMIN — TAMSULOSIN HYDROCHLORIDE 0.4 MG: 0.4 CAPSULE ORAL at 20:49

## 2023-01-21 RX ADMIN — PIPERACILLIN AND TAZOBACTAM 4500 MG: 4; .5 INJECTION, POWDER, LYOPHILIZED, FOR SOLUTION INTRAVENOUS at 17:26

## 2023-01-21 RX ADMIN — POTASSIUM CHLORIDE 20 MEQ: 20 TABLET, EXTENDED RELEASE ORAL at 10:12

## 2023-01-21 RX ADMIN — BUMETANIDE 1 MG: 0.25 INJECTION, SOLUTION INTRAMUSCULAR; INTRAVENOUS at 17:26

## 2023-01-21 RX ADMIN — PIPERACILLIN AND TAZOBACTAM 4500 MG: 4; .5 INJECTION, POWDER, LYOPHILIZED, FOR SOLUTION INTRAVENOUS at 23:57

## 2023-01-21 RX ADMIN — PIPERACILLIN AND TAZOBACTAM 4500 MG: 4; .5 INJECTION, POWDER, LYOPHILIZED, FOR SOLUTION INTRAVENOUS at 06:58

## 2023-01-21 RX ADMIN — FINASTERIDE 5 MG: 5 TABLET, FILM COATED ORAL at 10:13

## 2023-01-21 RX ADMIN — DEXAMETHASONE SODIUM PHOSPHATE 6 MG: 4 INJECTION, SOLUTION INTRAMUSCULAR; INTRAVENOUS at 20:49

## 2023-01-21 RX ADMIN — MELATONIN 3 MG ORAL TABLET 3 MG: 3 TABLET ORAL at 03:13

## 2023-01-21 RX ADMIN — METOPROLOL SUCCINATE 25 MG: 25 TABLET, EXTENDED RELEASE ORAL at 10:13

## 2023-01-21 NOTE — PROGRESS NOTES
Anshu Hilario MD FACP  Internal medicine  Progress Notes      CHIEF COMPLAINT: Shortness of breath      HISTORY OF PRESENT ILLNESS:    1/15/2023  Patient was seen on the floor earlier this morning at the main campus of [facility]  He was in isolation room due to [place] test being positive  He was a poor historian  Registered nurse at bedside  27-year-old  man being evaluated for TAVR at [facility] for severe aortic stenosis  He claims that for the last 1 month he has not felt well with worsening shortness of breath  He does not know if he had fever or chills  Tested positive for COVID  Was in CHF  Responded very well to 1 dose of IV Lasix  Admitted for further management  1/16/2023  Patient was seen on the floor earlier this morning  Overnight combativeness reported  He was pulling IVs out and wanted to go home  This morning he is fully oriented and wants to go home  Not requiring any supplemental oxygen  No dyspnea or acute distress  1/17/2023  Patient was seen on the floor earlier this morning  Not doing well  Discharge was canceled yesterday  Currently in restraints due to combativeness and pulling on lines  On supplemental oxygen  1/18/2023  Patient was seen on the floor earlier this morning  He was in isolation room due to COVID-positive test  Requiring restraints due to agitation  On supplemental oxygen  Chest x-ray showing infiltrates  1/19/2023  Patient was seen on the floor earlier this morning  Spoke with pulmonary  Remains agitated  1/20/2023  Patient was seen on the floor earlier this morning  Overnight he was combative and agitated  Overall he is doing poorly  1/21/2023  Patient was seen on the floor earlier this morning  Remains in isolation room due to COVID status  On supplemental oxygen through nasal cannula  He looks much more comfortable without any distress  Mental status at baseline with some agitation  Past Medical History:    Past Medical History:   Diagnosis Date Asthma     MILD, no issues    BPH (benign prostatic hypertrophy)     CAD (coronary artery disease)     follows with Dr Kay Darling. COPD (chronic obstructive pulmonary disease) ([facility] [address]     Glaucoma     patient unsure    Hyperlipidemia     Hypertension     Preoperative clearance 10/10/2017    cardiac, Dr Kay Darling. Retention of urine     Valvular heart disease     MV insufficiency, tricuspid regurgitation         Past Surgical History:    Past Surgical History:   Procedure Laterality Date    CATARACT REMOVAL  11/10 & 1/11    LEFT & RIGHT    COLONOSCOPY  11/7/2011    DIVERTICULOSIS    CORONARY ANGIOPLASTY  10/02/02    2 STENTS OF OM2 WITH GOOD RESULTS. DIAGNOSTIC CARDIAC CATH LAB PROCEDURE  10/02/02    DONE SECONDARY TO RECURRENT CP & MILD ISCHEMIA OF ANTEROLATERAL WALL ON NUCLEAR, SHOWD 30-40% 2ND DIAGONAL STENOSIS.  20-30% MID LAD STENSOIS. 90% MID OM2 STENOSIS. PATENT RCA. NORMAL LV FUNCTION W/EF MORE THAN 55%. EYE SURGERY      HIP ARTHROPLASTY Right 11/07/2017    SKIN BIOPSY      TONSILLECTOMY      TUMOR EXCISION  4 YRS AGO    RIGHT EAR       Medications Prior to Admission:    Medications Prior to Admission: Rosuvastatin Calcium 20 MG CPSP, Take by mouth  acetaminophen (TYLENOL) 325 MG tablet, Take 2 tablets by mouth every 6 hours as needed for Pain  Multiple Vitamins-Minerals (OCUVITE) TABS oral tablet, Take 1 tablet by mouth daily  tamsulosin (FLOMAX) 0.4 MG capsule, Take 0.4 mg by mouth daily. Multiple Vitamins-Minerals (CENTRUM SILVER ADULT 50+) TABS, Take 1 tablet by mouth daily. finasteride (PROSCAR) 5 MG tablet, Take 5 mg by mouth daily. [DISCONTINUED] metoprolol (TOPROL-XL) 25 MG XL tablet, Take 12.5 mg by mouth nightly    Allergies:    Patient has no known allergies. Social History:    reports that he has quit smoking. His smoking use included cigarettes. He has a 30.00 pack-year smoking history. He has never used smokeless tobacco. He reports current alcohol use.  He reports that he does not use drugs. Family History:   family history includes Cancer in his brother and sister; Hypertension in his sister. REVIEW OF SYSTEMS:  As above in the HPI, otherwise negative    PHYSICAL EXAM:    Vitals:  BP (!) 145/72   Pulse 82   Temp 97.9 °F (36.6 °C) (Axillary)   Resp 22   Ht 5' 4\" (1.626 m)   Wt 145 lb (65.8 kg)   SpO2 90%   BMI 24.89 kg/m²     General:  Awake, alert, oriented X 3. Somewhat confused  And combative  HEENT:  Normocephalic, atraumatic. Pupils equal, round, reactive to light. No scleral icterus. No conjunctival injection. No nasal discharge. Oral mucosa is dry neck:  Supple  Heart:  RRR, no murmurs, gallops, rubs  Lungs: Diffuse wheezes noted  Abdomen:   Bowel sounds present, soft, nontender, no masses, no organomegaly, no peritoneal signs  Extremities:  No clubbing, cyanosis, or edema  Skin:  Warm and dry, no open lesions or rash  Neuro:  Cranial nerves 2-12 intact, no focal deficits  Generalized muscle atrophy noted  Breast: deferred  Rectal: deferred  Genitalia:  deferred    LABS:  Data reviewed      ASSESSMENT:    Overall he is better today  Appears dehydrated clinically   bronchospastic this morning  Suspected aspiration  Principal Problem:    Acute respiratory failure with hypoxia ([address]  COVID-positive test  Acute CHF/well compensated now  Severe aortic stenosis  Remote coronary artery disease      PLAN:  I spoke with the nephew who is a physician from out of town  He was questioning whether this is pulmonary edema or aspiration pneumonia  Initial CAT scan showing pattern of viral pneumonitis  Repeat chest x-ray suggestive of pulmonary edema with high BNP  I ordered a dose of Bumex IV  Repeat chest x-ray BNP in the morning  Check CRP and procalcitonin  Continue antibiotics for the time being  Resume other home medications  Questions answered to Maninder Jasso MD  3:39 PM  1/21/2023

## 2023-01-21 NOTE — PROGRESS NOTES
SPEECH/LANGUAGE PATHOLOGY  CLINICAL ASSESSMENT OF SWALLOWING FUNCTION   and PLAN OF CARE  PATIENT NAME:  Batsheva Camacho  (male)     MRN:  [id number]    :  3/20/1930  (80 y.o.)  STATUS:  Inpatient: Room 6405/6405-B    TODAY'S DATE:  2023  REFERRING PROVIDER:   Dr. Musa Ortega: SLP swallowing-dysphagia evaluation and treatment Date of order:  2023  REASON FOR REFERRAL: dysphagia poor intake    EVALUATING THERAPIST: Manas Campuzano SLP                 ASSESSMENT:    DYSPHAGIA DIAGNOSIS:   Clinical indicators of functional swallow for age/premorbid functioning      DIET RECOMMENDATIONS:  Regular consistency solids (IDDSI level 7) with  thin liquids (IDDSI level 0)     FEEDING RECOMMENDATIONS:     Assistance level:  No assistance needed      Compensatory strategies recommended: No strategies are recommended at this time      Discussed recommendations with nursing?: No secondary to no diet/liquid change recommended     SPEECH THERAPY  PLAN OF CARE   The dysphagia POC is established based on physician order, dysphagia diagnosis and results of clinical assessment     Dysphagia therapy is not recommended     Conditions Requiring Skilled Therapeutic Intervention for dysphagia:    not applicable    Specific dysphagia interventions to include:     Not applicable    Specific instructions for next treatment:  not applicable   Patient Treatment Goals:    Short Term Goals:  Not applicable no therapy warranted     Long Term Goals:   Not applicable no therapy warranted      Patient/family Goal:    not applicable                    ADMITTING DIAGNOSIS: Acute respiratory failure with hypoxia ([facility]) [J96.01]  Acute on chronic combined systolic and diastolic CH[facility] (congestive heart failure) [facility] [I50.43]  COVID-19 [U07.1]    VISIT DIAGNOSIS:   Visit Diagnoses         Codes    COVID-19     U07.1    Acute on chronic combined systolic and diastolic CHF (congestive heart failure) ([facility])     I50.43 PATIENT REPORT/COMPLAINT: denies difficulty swallowing  patient continually stating I am not hungry why should I eat when I am not hungry. I just want to go home   meal tray present during evaluation     PRIOR LEVEL OF SWALLOW FUNCTION:    PAST HISTORY OF DYSPHAGIA?: none reported  family present and report he just does not want to eat. Home diet: Regular consistency solids (IDDSI level 7) with  thin liquids (IDDSI level 0)    Current Diet Order:  ADULT DIET; Regular; Low Fat/Low Chol/High Fiber/2 gm Na  ADULT ORAL NUTRITION SUPPLEMENT; Lunch; Standard High Calorie/High Protein Oral Supplement  ADULT ORAL NUTRITION SUPPLEMENT; Dinner; Frozen Oral Supplement    PROCEDURE:  Consistencies Administered During the Evaluation   Liquids: thin liquid   Solids:  pureed foods and soft solid foods      Method of Intake:   cup, straw, spoon  Self fed      Position:   Seated, upright    CLINICAL ASSESSMENT  Oral Stage: The oral stage of swallowing was within functional limits      Pharyngeal Stage:    No signs of aspiration were noted during this evaluation however, silent aspiration cannot be ruled out at bedside. If silent aspiration is suspected, a Videofluoroscopic Study of Swallowing (MBS) is recommended and requires a physician order. Cognition:   Within functional limits for this exam and Confusion noted    Oral Peripheral Examination   Adequate lingual/labial strength     Current Respiratory Status    8 liters O2 via high flow nasal cannula     Parameters of Speech Production  Respiration:  Adequate for speech production  Quality:   Within functional limits  Intensity: Within functional limits    Volitional Swallow: Present    Volitional Cough:    Present    Pain: No pain reported. EDUCATION:   The Speech Language Pathologist (SLP) completed education regarding results of evaluation and that intervention is not warranted at this time.   Learner: Patient and Family  Education:  Reviewed results and recommendations of this evaluation  Evaluation of Education: Verbalizes understanding--family     This plan will be re-evaluated and revised in 1 week  if warranted. CPT code:  79033  bedside swallow eval      [x]The admitting diagnosis and active problem list, have been reviewed prior to initiation of this evaluation.         ACTIVE PROBLEM LIST:   Patient Active Problem List   Diagnosis    CAD (coronary artery disease)    COPD (chronic obstructive pulmonary disease) ([facility] [address]    Benign prostatic hyperplasia    Hyperlipidemia    Hypertension    Glaucoma    Valvular heart disease    Acute respiratory failure with hypoxia ([facility] [address]    RBBB    Frequent PVCs    Palliative care encounter           Neva Amanda MSCCC/SLP  Speech Language Pathologist  [id number]

## 2023-01-22 ENCOUNTER — APPOINTMENT (OUTPATIENT)
Dept: GENERAL RADIOLOGY | Age: 88
DRG: 177 | End: 2023-01-22
Payer: MEDICARE

## 2023-01-22 LAB — PRO-BNP: ABNORMAL PG/ML (ref 0–450)

## 2023-01-22 PROCEDURE — 2140000000 HC CCU INTERMEDIATE R&B

## 2023-01-22 PROCEDURE — 6360000002 HC RX W HCPCS: Performed by: INTERNAL MEDICINE

## 2023-01-22 PROCEDURE — 6370000000 HC RX 637 (ALT 250 FOR IP): Performed by: INTERNAL MEDICINE

## 2023-01-22 PROCEDURE — 2700000000 HC OXYGEN THERAPY PER DAY

## 2023-01-22 PROCEDURE — 71045 X-RAY EXAM CHEST 1 VIEW: CPT

## 2023-01-22 PROCEDURE — 2580000003 HC RX 258: Performed by: INTERNAL MEDICINE

## 2023-01-22 PROCEDURE — 83880 ASSAY OF NATRIURETIC PEPTIDE: CPT

## 2023-01-22 PROCEDURE — 36415 COLL VENOUS BLD VENIPUNCTURE: CPT

## 2023-01-22 PROCEDURE — 94640 AIRWAY INHALATION TREATMENT: CPT

## 2023-01-22 PROCEDURE — 99232 SBSQ HOSP IP/OBS MODERATE 35: CPT | Performed by: INTERNAL MEDICINE

## 2023-01-22 RX ADMIN — PIPERACILLIN AND TAZOBACTAM 4500 MG: 4; .5 INJECTION, POWDER, LYOPHILIZED, FOR SOLUTION INTRAVENOUS at 15:40

## 2023-01-22 RX ADMIN — ENOXAPARIN SODIUM 40 MG: 100 INJECTION SUBCUTANEOUS at 09:23

## 2023-01-22 RX ADMIN — POTASSIUM CHLORIDE 20 MEQ: 20 TABLET, EXTENDED RELEASE ORAL at 09:23

## 2023-01-22 RX ADMIN — TAMSULOSIN HYDROCHLORIDE 0.4 MG: 0.4 CAPSULE ORAL at 22:54

## 2023-01-22 RX ADMIN — METOPROLOL SUCCINATE 25 MG: 25 TABLET, EXTENDED RELEASE ORAL at 09:23

## 2023-01-22 RX ADMIN — PIPERACILLIN AND TAZOBACTAM 4500 MG: 4; .5 INJECTION, POWDER, LYOPHILIZED, FOR SOLUTION INTRAVENOUS at 06:29

## 2023-01-22 RX ADMIN — ASPIRIN 81 MG: 81 TABLET, COATED ORAL at 09:23

## 2023-01-22 RX ADMIN — Medication 1 CAPSULE: at 09:24

## 2023-01-22 RX ADMIN — PIPERACILLIN AND TAZOBACTAM 4500 MG: 4; .5 INJECTION, POWDER, LYOPHILIZED, FOR SOLUTION INTRAVENOUS at 22:53

## 2023-01-22 RX ADMIN — IPRATROPIUM BROMIDE AND ALBUTEROL SULFATE 1 AMPULE: 2.5; .5 SOLUTION RESPIRATORY (INHALATION) at 19:49

## 2023-01-22 RX ADMIN — DEXAMETHASONE SODIUM PHOSPHATE 6 MG: 4 INJECTION, SOLUTION INTRAMUSCULAR; INTRAVENOUS at 22:57

## 2023-01-22 RX ADMIN — FINASTERIDE 5 MG: 5 TABLET, FILM COATED ORAL at 09:23

## 2023-01-22 RX ADMIN — ATORVASTATIN CALCIUM 40 MG: 40 TABLET, FILM COATED ORAL at 22:54

## 2023-01-22 RX ADMIN — MELATONIN 3 MG ORAL TABLET 3 MG: 3 TABLET ORAL at 22:54

## 2023-01-22 NOTE — PROGRESS NOTES
Kayden Wilkinson MD FACP  Internal medicine  Progress Notes      CHIEF COMPLAINT: Shortness of breath      HISTORY OF PRESENT ILLNESS:    1/15/2023  Patient was seen on the floor earlier this morning at the main campus of [facility]  He was in isolation room due to [place] test being positive  He was a poor historian  Registered nurse at bedside  80-year-old  man being evaluated for TAVR at [facility] for severe aortic stenosis  He claims that for the last 1 month he has not felt well with worsening shortness of breath  He does not know if he had fever or chills  Tested positive for COVID  Was in CHF  Responded very well to 1 dose of IV Lasix  Admitted for further management  1/16/2023  Patient was seen on the floor earlier this morning  Overnight combativeness reported  He was pulling IVs out and wanted to go home  This morning he is fully oriented and wants to go home  Not requiring any supplemental oxygen  No dyspnea or acute distress  1/17/2023  Patient was seen on the floor earlier this morning  Not doing well  Discharge was canceled yesterday  Currently in restraints due to combativeness and pulling on lines  On supplemental oxygen  1/18/2023  Patient was seen on the floor earlier this morning  He was in isolation room due to COVID-positive test  Requiring restraints due to agitation  On supplemental oxygen  Chest x-ray showing infiltrates  1/19/2023  Patient was seen on the floor earlier this morning  Spoke with pulmonary  Remains agitated  1/20/2023  Patient was seen on the floor earlier this morning  Overnight he was combative and agitated  Overall he is doing poorly  1/21/2023  Patient was seen on the floor earlier this morning  Remains in isolation room due to COVID status  On supplemental oxygen through nasal cannula  He looks much more comfortable without any distress  Mental status at baseline with some agitation  1/22/2023  Patient was seen on the floor earlier this morning  Able to sleep flat on bed  On supplemental oxygen 6 L through nasal cannula  Past Medical History:    Past Medical History:   Diagnosis Date    Asthma     MILD, no issues    BPH (benign prostatic hypertrophy)     CAD (coronary artery disease)     follows with Dr Mariaa Barney. COPD (chronic obstructive pulmonary disease) ([facility] [address]     Glaucoma     patient unsure    Hyperlipidemia     Hypertension     Preoperative clearance 10/10/2017    cardiac, Dr Mariaa Barney. Retention of urine     Valvular heart disease     MV insufficiency, tricuspid regurgitation         Past Surgical History:    Past Surgical History:   Procedure Laterality Date    CATARACT REMOVAL  11/10 & 1/11    LEFT & RIGHT    COLONOSCOPY  11/7/2011    DIVERTICULOSIS    CORONARY ANGIOPLASTY  10/02/02    2 STENTS OF OM2 WITH GOOD RESULTS. DIAGNOSTIC CARDIAC CATH LAB PROCEDURE  10/02/02    DONE SECONDARY TO RECURRENT CP & MILD ISCHEMIA OF ANTEROLATERAL WALL ON NUCLEAR, SHOWD 30-40% 2ND DIAGONAL STENOSIS.  20-30% MID LAD STENSOIS. 90% MID OM2 STENOSIS. PATENT RCA. NORMAL LV FUNCTION W/EF MORE THAN 55%. EYE SURGERY      HIP ARTHROPLASTY Right 11/07/2017    SKIN BIOPSY      TONSILLECTOMY      TUMOR EXCISION  4 YRS AGO    RIGHT EAR       Medications Prior to Admission:    Medications Prior to Admission: Rosuvastatin Calcium 20 MG CPSP, Take by mouth  acetaminophen (TYLENOL) 325 MG tablet, Take 2 tablets by mouth every 6 hours as needed for Pain  Multiple Vitamins-Minerals (OCUVITE) TABS oral tablet, Take 1 tablet by mouth daily  tamsulosin (FLOMAX) 0.4 MG capsule, Take 0.4 mg by mouth daily. Multiple Vitamins-Minerals (CENTRUM SILVER ADULT 50+) TABS, Take 1 tablet by mouth daily. finasteride (PROSCAR) 5 MG tablet, Take 5 mg by mouth daily. [DISCONTINUED] metoprolol (TOPROL-XL) 25 MG XL tablet, Take 12.5 mg by mouth nightly    Allergies:    Patient has no known allergies. Social History:    reports that he has quit smoking.  His smoking use included cigarettes. He has a 30.00 pack-year smoking history. He has never used smokeless tobacco. He reports current alcohol use. He reports that he does not use drugs.    Family History:   family history includes Cancer in his brother and sister; Hypertension in his sister.    REVIEW OF SYSTEMS:  As above in the HPI, otherwise negative    PHYSICAL EXAM:    Vitals:  /80   Pulse 72   Temp 97.2 °F (36.2 °C) (Axillary)   Resp 18   Ht 5' 4\" (1.626 m)   Wt 145 lb (65.8 kg)   SpO2 96%   BMI 24.89 kg/m²     General:  Awake, alert,  Somewhat confused  And combative  HEENT:  Normocephalic, atraumatic.  Pupils equal, round, reactive to light.  No scleral icterus.  No conjunctival injection.  No nasal discharge.  Oral mucosa is dry neck:  Supple  Heart:  RRR, no murmurs, gallops, rubs  Lungs: Diffuse wheezes noted/much improved  Abdomen:  Bowel sounds present, soft, nontender, no masses, no organomegaly, no peritoneal signs  Extremities:  No clubbing, cyanosis, or edema  Skin:  Warm and dry, no open lesions or rash  Neuro:  Cranial nerves 2-12 intact, no focal deficits  Generalized muscle atrophy noted  Breast: deferred  Rectal: deferred  Genitalia:  deferred    LABS:  Data reviewed      ASSESSMENT:    Overall he is better today  Suspected aspiration  Principal Problem:    Acute respiratory failure with hypoxia (HCC)  COVID-positive test  Acute CHF/well compensated now  Severe aortic stenosis  Remote coronary artery disease      PLAN:  Unsafe to diuresis aggressively in view of  critical aortic stenosis  I spoke with the nephew who is a physician from out of town  He was questioning whether this is pulmonary edema or aspiration pneumonia 1/21/23  Initial CAT scan showing pattern of viral pneumonitis  Repeat chest x-ray suggestive of pulmonary edema with high BNP  I ordered a dose of Bumex IV 1/21/23  BNP and chest x-ray both show improvement this morning  Spoke with cardiology this morning  Continue antibiotics for the  time being  Resume other home medications  Questions answered to Chandrika Doll MD  11:32 AM  1/22/2023

## 2023-01-22 NOTE — PROGRESS NOTES
[place]  Department of Internal Medicine  Division of Pulmonary, Critical Care and Sleep Medicine  Consult Note    Antonino Sunshine DO, MPH, FCCP, FACOI, FACP  Pilar Hernandez DO, FCCP  MD Yuri Cade, APRN-CNP       SUBJECTIVE:    Mr. Enmanuel Hair was seen and examined in his bed. He is much more awake and following commands for me however at the same time removing the pulse ox and ekg leads despite redirection. He denies any pain. Denies shortness of breath. Family member present at bedside. OBJECTIVE:    CONSTITUTIONAL:   WN, WD,. NAD  SKIN:     No rash, No suspicious lesions, No skin discoloration  HEENT:     EOMI, MMM, No thrush  NECK:    No bruits, No JVP appreciated  CV:      2 out of 6 systolic murmur, regular. PULMONARY:   Few crackles in bases. Otherwise clear  ABDOMEN:     Soft, non-tender. BS normal. No R/R/G  EXT:    No deformities . No clubbing. No lower extremity edema, No venous stasis  PULSE:   Appears equal and palpable.   PSYCHIATRIC:  Unable to assess due to altered mental status  MS:    No fractures, No gross weakness  NEUROLOGIC:   Withdraws from tactile stimuli     DATA: IMAGING & TESTING:     LABORATORY TESTS:    CBC with Differential:    Lab Results   Component Value Date/Time    WBC 8.9 01/21/2023 06:37 AM    RBC 5.13 01/21/2023 06:37 AM    HGB 16.3 01/21/2023 06:37 AM    HCT 49.7 01/21/2023 06:37 AM     01/21/2023 06:37 AM    MCV 96.9 01/21/2023 06:37 AM    MCH 31.8 01/21/2023 06:37 AM    MCHC 32.8 01/21/2023 06:37 AM    RDW 17.3 01/21/2023 06:37 AM    BANDSPCT 3 08/29/2016 07:14 PM    METASPCT 1 08/29/2016 07:14 PM    LYMPHOPCT 4.6 01/21/2023 06:37 AM    MONOPCT 4.7 01/21/2023 06:37 AM    MYELOPCT 2 08/29/2016 07:14 PM    BASOPCT 0.1 01/21/2023 06:37 AM    MONOSABS 0.42 01/21/2023 06:37 AM    LYMPHSABS 0.41 01/21/2023 06:37 AM    EOSABS 0.00 01/21/2023 06:37 AM    BASOSABS 0.01 01/21/2023 06:37 AM CMP:    Lab Results   Component Value Date/Time     01/21/2023 06:37 AM    K 4.8 01/21/2023 06:37 AM    K 3.6 01/14/2023 06:30 PM     01/21/2023 06:37 AM    CO2 29 01/21/2023 06:37 AM    BUN 31 01/21/2023 06:37 AM    CREATININE 0.9 01/21/2023 06:37 AM    GFRAA >60 06/10/2022 05:03 PM    LABGLOM >60 01/21/2023 06:37 AM    GLUCOSE 125 01/21/2023 06:37 AM    PROT 5.8 01/21/2023 06:37 AM    LABALBU 3.2 01/21/2023 06:37 AM    CALCIUM 8.7 01/21/2023 06:37 AM    BILITOT 1.1 01/21/2023 06:37 AM    ALKPHOS 66 01/21/2023 06:37 AM    AST 38 01/21/2023 06:37 AM    ALT 34 01/21/2023 06:37 AM        PRO-BNP:   Lab Results   Component Value Date    PROBNP 25,864 (H) 01/19/2023    PROBNP 14,442 (H) 01/14/2023      ABGs:   Lab Results   Component Value Date/Time    PH 7.440 01/19/2023 02:04 PM    PO2 79.9 01/19/2023 02:04 PM    PCO2 34.0 01/19/2023 02:04 PM     Hemoglobin A1C: No components found for: HGBA1C    IMAGING:  Imaging tests were completed and reviewed and discussed radiology and care team involved and reveals   XR CHEST PORTABLE    Result Date: 1/14/2023  EXAMINATION: ONE XRAY VIEW OF THE CHEST 1/14/2023 7:35 pm COMPARISON: 06/10/2022 HISTORY: ORDERING SYSTEM PROVIDED HISTORY: short of breath TECHNOLOGIST PROVIDED HISTORY: Reason for exam:->short of breath What reading provider will be dictating this exam?->CRC FINDINGS: The lungs are without acute focal process. There is no effusion or pneumothorax. Cardiomegaly. The osseous structures are without acute process. No acute process. XR CHEST 1 VIEW    Result Date: 1/17/2023  EXAMINATION: ONE XRAY VIEW OF THE CHEST 1/17/2023 11:52 am COMPARISON: 01/14/2023 chest radiograph and CTA chest. HISTORY: ORDERING SYSTEM PROVIDED HISTORY: hypoxia TECHNOLOGIST PROVIDED HISTORY: Reason for exam:->hypoxia What reading provider will be dictating this exam?->CRC FINDINGS: There are interstitial and patchy airspace densities throughout the lungs.  There is probable blunting of right costophrenic sulcus. Cardiac silhouette is enlarged. Prominent right paratracheal soft tissues is probably due to rotation of the current study. There is atherosclerotic calcification of the thoracic aorta. There is moderate thoracic spondylosis. There are multiple remote left lateral rib fractures. No pneumothorax. Bilateral lung infiltrates consistent with pulmonary edema and or pneumonia. Probable small right pleural effusion. Stable enlargement of the cardiac silhouette. CTA PULMONARY W CONTRAST    Result Date: 1/14/2023  EXAMINATION: CTA OF THE CHEST 1/14/2023 10:17 pm TECHNIQUE: CTA of the chest was performed after the administration of intravenous contrast.  Multiplanar reformatted images are provided for review. MIP images are provided for review. Automated exposure control, iterative reconstruction, and/or weight based adjustment of the mA/kV was utilized to reduce the radiation dose to as low as reasonably achievable. COMPARISON: None. HISTORY: ORDERING SYSTEM PROVIDED HISTORY: r/o pe TECHNOLOGIST PROVIDED HISTORY: Reason for exam:->r/o pe Decision Support Exception - unselect if not a suspected or confirmed emergency medical condition->Emergency Medical Condition (MA) What reading provider will be dictating this exam?->CRC FINDINGS: Pulmonary Arteries: Pulmonary arteries are adequately opacified for evaluation. No evidence of intraluminal filling defect to suggest pulmonary embolism. Main pulmonary artery is normal in caliber. Mediastinum: No evidence of mediastinal lymphadenopathy. The heart and pericardium demonstrate no acute abnormality. There is no acute abnormality of the thoracic aorta. Lungs/pleura: The lungs are without acute process. There is mild bilateral peribronchial thickening. No focal consolidation. There is mild interlobular septal thickening. No evidence of pneumothorax. Moderate bilateral pleural effusions.  Upper Abdomen: Limited images of the upper abdomen are unremarkable. Soft Tissues/Bones: No acute bone or soft tissue abnormality. No evidence of pulmonary embolism. Mild interlobular septal thickening, which could suggest slight pulmonary edema. Moderate bilateral pleural effusions. Clinical and laboratory correlation recommended. CT chest January 19, 2022   Limited study. Diffuse bilateral patchy and ground-glass infiltrates and pleural effusions   likely CHF/edema and or diffuse pneumonia. Cardiomegaly and coronary artery calcification. HISTORY OF PRESENT ILLNESS : Jacinta Marina 80 y.o. male was seen in consultation regarding the above chief compliant. Patient presented to the emergency room regarding shortness of breath. He did have a positive COVID test.  Unfortunately he is a poor historian. At time of my examination he is quite drowsy and is not oriented. He has apparently been quite agitated intermittently. Unfortunately I am unsure of his baseline mentation. Assessment:     Acute hypoxemic respiratory failure, continues to require supplemental oxygen  COVID-19 positive  Concern for aspiration pneumonia, piperacillin tazobactam.    Delirium with altered mental status, more alert today continues to remain confused and combative at times  HFrEF 40-45%, Elevated proBNP, 14,442>> 25,860  Severe aortic stenosis, plan is for TAVR in [facility] [employer]  Suspect sleep apnea, may need cpap while inpatient  Risk for aspiration  Poor oral intake d/t mental status, improving        Plan:     Continue to increase oral intake. ABGs reviewed-->7.44, 34.0, 79.9, 22.6-respiratory alkalosis   Repeat proBNP-->25, 864  CT Head reviewed- no acute abnormality  CT Chest-diffuse bilateral patchy and groundglass infiltrates and pleural effusions likely viral pna vs pulm edema.  Procal reassuring at 0.08  Wean FiO2 as tolerated  Continue Decadron, once IV access is re-established  CRP 4.9--now decreased to 2.0 less than 7.5 Tocilizumab not indicated,   Continue breathing treatments ordered    Delirium precautions  Recommend avoiding all sedating medications including pain medication and Haldol  Appreciate cardiology recommendations  Aspiration precautions, HOB elevated at least 30 degrees  Case discussed with Dr. Carleen Boyce, plan for discharge planning in the next few days.  Continue to wean FIO2      Iona Morris DO

## 2023-01-22 NOTE — PROGRESS NOTES
INPATIENT CARDIOLOGY CONSULT    Name: Ria Fleischer    Age: 80 y.o. Date of Admission: 1/14/2023  5:57 PM    Date of Service: 1/21/2023    Reason for Consultation: CHF, AS    Referring Physician: Silvia Roach MD    Primary Cardiologist: Iris Garland    Interim:  Visit done remotely due to ongoing treatment for COVID-19. No significant event overnight. CODE STATUS now documented to be DNR CCA. Apparently has appointment with the [facility] on January 25, 2023    It is reported  patient has close follow-up with [facility] and being considered for TAVR at the [facility]. Chart record shows patient will be discharged and once discharged and follow-up with the [facility] cardiac center for a plan for TAVR. Past Medical History:  Past Medical History:   Diagnosis Date    Asthma     MILD, no issues    BPH (benign prostatic hypertrophy)     CAD (coronary artery disease)     follows with Dr Keri Pinzon. COPD (chronic obstructive pulmonary disease) ([address]     Glaucoma     patient unsure    Hyperlipidemia     Hypertension     Preoperative clearance 10/10/2017    cardiac, Dr Keri Pinzon. Retention of urine     Valvular heart disease     MV insufficiency, tricuspid regurgitation         Past Surgical History:  Past Surgical History:   Procedure Laterality Date    CATARACT REMOVAL  11/10 & 1/11    LEFT & RIGHT    COLONOSCOPY  11/7/2011    DIVERTICULOSIS    CORONARY ANGIOPLASTY  10/02/02    2 STENTS OF OM2 WITH GOOD RESULTS. DIAGNOSTIC CARDIAC CATH LAB PROCEDURE  10/02/02    DONE SECONDARY TO RECURRENT CP & MILD ISCHEMIA OF ANTEROLATERAL WALL ON NUCLEAR, SHOWD 30-40% 2ND DIAGONAL STENOSIS.  20-30% MID LAD STENSOIS. 90% MID OM2 STENOSIS. PATENT RCA. NORMAL LV FUNCTION W/EF MORE THAN 55%.     EYE SURGERY      HIP ARTHROPLASTY Right 11/07/2017    SKIN BIOPSY      TONSILLECTOMY      TUMOR EXCISION  4 YRS AGO    RIGHT EAR       Family History:  Family History   Problem Relation Age of Onset Hypertension Sister     Cancer Brother     Cancer Sister        Social History:  Social History     Tobacco Use    Smoking status: Former     Packs/day: 2.00     Years: 15.00     Pack years: 30.00     Types: Cigarettes    Smokeless tobacco: Never    Tobacco comments:     quit 45 years ago   Vaping Use    Vaping Use: Never used   Substance Use Topics    Alcohol use: Yes     Comment: Socially/ coffee daily     Drug use: No       Allergies:  No Known Allergies    Home Medications:  Prior to Admission medications    Medication Sig Start Date End Date Taking? Authorizing Provider   aspirin 81 MG EC tablet Take 1 tablet by mouth daily DVT prophylaxis following hip replacement x 30 days 1/16/23  Yes Luciana Aguirre MD   metoprolol succinate (TOPROL XL) 25 MG extended release tablet Take 1 tablet by mouth daily 1/17/23  Yes Luciana Aguirre MD   furosemide (LASIX) 20 MG tablet Take 1 tablet by mouth three times a week 1/16/23  Yes Luciana Aguirre MD   Rosuvastatin Calcium 20 MG CPSP Take by mouth   Yes Historical Provider, MD   acetaminophen (TYLENOL) 325 MG tablet Take 2 tablets by mouth every 6 hours as needed for Pain 11/9/17 11/30/22  Emily Green MD   Multiple Vitamins-Minerals (OCUVITE) TABS oral tablet Take 1 tablet by mouth daily    Historical Provider, MD   tamsulosin (FLOMAX) 0.4 MG capsule Take 0.4 mg by mouth daily. 12/15/14   Historical Provider, MD   Multiple Vitamins-Minerals (CENTRUM SILVER ADULT 50+) TABS Take 1 tablet by mouth daily. Historical Provider, MD   finasteride (PROSCAR) 5 MG tablet Take 5 mg by mouth daily.       Historical Provider, MD       Current Medications:    Current Facility-Administered Medications:     piperacillin-tazobactam (ZOSYN) 4,500 mg in dextrose 5 % 100 mL IVPB ([id number]), 4,500 mg, IntraVENous, Q8H, Luciana Aguirre MD, Last Rate: 25 mL/hr at 01/21/23 1726, 4,500 mg at 01/21/23 1726    ipratropium-albuterol (DUONEB) nebulizer solution 1 ampule, 1 ampule, Inhalation, 4x daily, Lita Delgado MD, 1 ampule at 01/18/23 0920    haloperidol lactate (HALDOL) injection 2 mg, 2 mg, IntraMUSCular, Q6H PRN, Lita Delgado MD, 2 mg at 01/20/23 1328    acetaminophen (TYLENOL) tablet 650 mg, 650 mg, Oral, Q6H PRN, Lita Delgado MD, 650 mg at 01/20/23 0950    aspirin EC tablet 81 mg, 81 mg, Oral, Daily, Lita Delgado MD, 81 mg at 01/21/23 1013    finasteride (PROSCAR) tablet 5 mg, 5 mg, Oral, Daily, Lita Delgado MD, 5 mg at 01/21/23 1013    ocuvite-lutein multivitamin 1 capsule, 1 capsule, Oral, Daily, Lita Delgado MD, 1 capsule at 01/21/23 1013    enoxaparin (LOVENOX) injection 40 mg, 40 mg, SubCUTAneous, Daily, Lita Delgado MD, 40 mg at 01/21/23 1013    melatonin tablet 3 mg, 3 mg, Oral, Nightly PRN, Lita Delgado MD, 3 mg at 01/21/23 0313    potassium chloride (KLOR-CON M) extended release tablet 20 mEq, 20 mEq, Oral, Daily with breakfast, Lita Delgado MD, 20 mEq at 01/21/23 1012    atorvastatin (LIPITOR) tablet 40 mg, 40 mg, Oral, Nightly, Kae Norton MD, 40 mg at 01/20/23 2028    tamsulosin [employer]) capsule 0.4 mg, 0.4 mg, Oral, QHS, Kae Norton MD, 0.4 mg at 01/20/23 2027    metoprolol succinate (TOPROL XL) extended release tablet 25 mg, 25 mg, Oral, Daily, Kae Norton MD, 25 mg at 01/21/23 1013    dexamethasone (DECADRON) injection 6 mg, 6 mg, IntraVENous, Q24H, Lita Delgado MD, 6 mg at 01/20/23 2028    Physical Exam:  BP (!) 145/72   Pulse 82   Temp 97.9 °F (36.6 °C) (Axillary)   Resp 22   Ht 5' 4\" (1.626 m)   Wt 145 lb (65.8 kg)   SpO2 90%   BMI 24.89 kg/m²   Wt Readings from Last 3 Encounters:   01/14/23 145 lb (65.8 kg)   12/15/22 145 lb (65.8 kg)   12/07/22 141 lb (64 kg)     Appearance: Elderly male, awake, alert, appears dyspneic, on nasal cannula  Skin: Intact, no rash  Head: Normocephalic, atraumatic  Eyes: EOMI, no conjunctival erythema  ENMT: No pharyngeal erythema, MMM, no rhinorrhea  Neck: Supple, mildly elevated JVP, no carotid bruits  Lungs: Diminished, few basilar rales  Cardiac: PMI nondisplaced, regular rhythm with a normal rate, frequent ectopy, normal S1 & S2, systolic murmur difficult to characterize given heavy breathing and inability to hold his breath for more than a second  Abdomen: Soft, nontender, +bowel sounds  Extremities: Moves all extremities x 4, 1+ dependent lower extremity edema  Neurologic: No focal motor deficits apparent, normal mood and affect  Peripheral Pulses: Intact posterior tibial pulses bilaterally    Intake/Output:    Intake/Output Summary (Last 24 hours) at 1/21/2023 1922  Last data filed at 1/21/2023 1100  Gross per 24 hour   Intake 300 ml   Output 300 ml   Net 0 ml       No intake/output data recorded.    Laboratory Tests:  Recent Labs     01/19/23  1102 01/19/23  1404 01/20/23  0831 01/21/23  0637     144  --  142 144   K 4.1  4.1 3.77 4.0 4.8     105  --  97* 105   CO2 26  26  --  23 29   BUN 29*  29*  --  31* 31*   CREATININE 0.7  0.7  --  0.7 0.9   GLUCOSE 67*  66*  --  347* 125*   CALCIUM 9.1  9.0  --  8.2* 8.7     Lab Results   Component Value Date/Time    MG 2.2 01/15/2023 05:46 AM     Recent Labs     01/19/23  1102 01/20/23  0831 01/21/23  0637   ALKPHOS 75  78 67 66   ALT 33  34 35 34   AST 51*  54* 43* 38   PROT 5.9*  5.9* 6.2* 5.8*   BILITOT 1.2  1.2 1.2 1.1   LABALBU 3.4*  3.4* 3.2* 3.2*     Recent Labs     01/19/23  1102 01/20/23  0831 01/21/23  0637   WBC 11.2  11.8* 7.2 8.9   RBC 5.54  5.48 5.11 5.13   HGB 17.2*  17.1* 16.3 16.3   HCT 51.9  51.4 48.5 49.7   MCV 93.7  93.8 94.9 96.9   MCH 31.0  31.2 31.9 31.8   MCHC 33.1  33.3 33.6 32.8   RDW 18.2*  17.3* 16.7* 17.3*     151 159 172   MPV 10.6  10.6 10.6 10.6       Lab Results   Component Value Date    TROPONINI <0.01 08/29/2016     No results found for: INR, PROTIME  Lab Results   Component Value Date    TSH 0.939 08/30/2016     No results found for: LABA1C  No results found for: EAG  No results  found for: CHOL  No results found for: TRIG  No results found for: HDL  No results found for: LDLCALC, LDLCHOLESTEROL  No results found for: LABVLDL, VLDL  No results found for: CHOLHDLRATIO  Recent Labs     23  1102   PROBNP 25,864*         Cardiac Tests:  EK2023: Sinus rhythm with PACs and PVCs. RBBB QRS duration 130 ms    Telemetry: Sinus rhythm 70s with PACs and frequent PVCs and couplets    Chest X-ray:   23       Impression   No acute process. CTA chest  Personally reviewed. Moderate seems like an overcall on these effusions    Impression   No evidence of pulmonary embolism. Mild interlobular septal thickening, which could suggest slight pulmonary   edema. Moderate bilateral pleural effusions. Clinical and laboratory   correlation recommended. Echocardiogram:   TTE 2022 [facility]   Summary   Top normal left ventricle size. Ejection fraction is visually estimated at 45%. Mild to moderate centrally directed mitral insufficiency   Severe aortic stenosis. Mild aortic regurgitation. Normal tricuspid valve structure and function. RVSP is 45 mmHg. Pulmonary hypertension is mild . Stress test:    Pharm stress 22  Gated SPECT left ventricular ejection fraction was calculated to be 36%, with global hypokinesis in the absence of regional wall motion abnormalities. Impression:    Electrocardiographically normal regadenoson infusion with a clinically nonischemic response. Myocardial perfusion imaging was normal.    Overall left ventricular systolic function was  mild to moderately impaired with global hypokinesis and no regional wall motion abnormalities with a dilated left ventricular chamber . 4.    Intermediate risk pharmacologic stress test    Cardiac catheterization:   10/2002 2.75 x12, 2.75 x 8 [facility] -  OM2 on 10/2/2002 -  naddour    -------------------------------------------------------------------------------------------------------------------------------------------------------------  IMPRESSION:  Acute on chronic heart failure with reduced ejection fraction. proBNP 14,000  Minimally elevated hs-cTnT: 50-44-48 ng/L flat pattern likely chronic myocardial injury. No evidence of ACS  Frequent PVCs  Cardiomyopathy EF 40-45% by echo, 36% by SPECT. Mixed ischemic, valvular  Severe aortic stenosis with mild AR, mild to moderate MR, mild TR  Coronary artery disease PCI to OM 2002  RBBB  COVID-19 pneumonia  Acute hypoxic respiratory failure  Bilateral pleural effusions  Hypertension  COPD  Hyperlipidemia    RECOMMENDATIONS:  Apparently patient has close follow-up with [facility] clinic and being considered for TAVR at the [facility] clinic. Apparently has appointment with the [facility] clinic on January 25, 2023      Follow-up with your cardiologist in the outpatient with [facility] for an ischemic and TAVR evaluation. Continue on cardiac medications as blood pressure allows   Avoid hypotension given severe aortic stenosis and discussed   Initiate low-dose Entresto if there is no contraindication and blood pressure can allow without hypotension     Cardiology will sign off. Please call with questions. Thank you for allowing me to participate in your patient's care. Please feel free to contact me if you have any questions or concerns.     Jack Gtz MD  [facility]) Cardiology

## 2023-01-22 NOTE — PROGRESS NOTES
[place]  Department of Internal Medicine  Division of Pulmonary, Critical Care and Sleep Medicine  Consult Note    Maria Luisa Gil DO, MPH, FCCP, FACOI, FACP  Pilar Hernandez DO, [facility]  MD Christine Bowman, APRN-CNP       SUBJECTIVE:    Mr. Linda Lovell was seen and examined. He is currently on room air. He denies any symptoms of shortness of breath. He is answering questions much more appropriately today. OBJECTIVE:    CONSTITUTIONAL:   WN, WD,. NAD  SKIN:     No rash, No suspicious lesions, No skin discoloration  HEENT:     EOMI, MMM, No thrush  NECK:    No bruits, No JVP appreciated  CV:      2 out of 6 systolic murmur, regular. PULMONARY:   Clear to auscultation bilaterally. ABDOMEN:     Soft, non-tender. BS normal. No R/R/G  EXT:    No deformities . No clubbing. No lower extremity edema, No venous stasis  PULSE:   Appears equal and palpable. PSYCHIATRIC:  Calm and cooperative. MS:    No fractures, No gross weakness  NEUROLOGIC:   Patient is more alert. Follows commands.     DATA: IMAGING & TESTING:     LABORATORY TESTS:    CBC with Differential:    Lab Results   Component Value Date/Time    WBC 8.9 01/21/2023 06:37 AM    RBC 5.13 01/21/2023 06:37 AM    HGB 16.3 01/21/2023 06:37 AM    HCT 49.7 01/21/2023 06:37 AM     01/21/2023 06:37 AM    MCV 96.9 01/21/2023 06:37 AM    MCH 31.8 01/21/2023 06:37 AM    MCHC 32.8 01/21/2023 06:37 AM    RDW 17.3 01/21/2023 06:37 AM    BANDSPCT 3 08/29/2016 07:14 PM    METASPCT 1 08/29/2016 07:14 PM    LYMPHOPCT 4.6 01/21/2023 06:37 AM    MONOPCT 4.7 01/21/2023 06:37 AM    MYELOPCT 2 08/29/2016 07:14 PM    BASOPCT 0.1 01/21/2023 06:37 AM    MONOSABS 0.42 01/21/2023 06:37 AM    LYMPHSABS 0.41 01/21/2023 06:37 AM    EOSABS 0.00 01/21/2023 06:37 AM    BASOSABS 0.01 01/21/2023 06:37 AM     CMP:    Lab Results   Component Value Date/Time     01/21/2023 06:37 AM    K 4.8 01/21/2023 06:37 AM K 3.6 01/14/2023 06:30 PM     01/21/2023 06:37 AM    CO2 29 01/21/2023 06:37 AM    BUN 31 01/21/2023 06:37 AM    CREATININE 0.9 01/21/2023 06:37 AM    GFRAA >60 06/10/2022 05:03 PM    LABGLOM >60 01/21/2023 06:37 AM    GLUCOSE 125 01/21/2023 06:37 AM    PROT 5.8 01/21/2023 06:37 AM    LABALBU 3.2 01/21/2023 06:37 AM    CALCIUM 8.7 01/21/2023 06:37 AM    BILITOT 1.1 01/21/2023 06:37 AM    ALKPHOS 66 01/21/2023 06:37 AM    AST 38 01/21/2023 06:37 AM    ALT 34 01/21/2023 06:37 AM        PRO-BNP:   Lab Results   Component Value Date    PROBNP 16,906 (H) 01/22/2023    PROBNP 25,864 (H) 01/19/2023      ABGs:   Lab Results   Component Value Date/Time    PH 7.440 01/19/2023 02:04 PM    PO2 79.9 01/19/2023 02:04 PM    PCO2 34.0 01/19/2023 02:04 PM     Hemoglobin A1C: No components found for: HGBA1C    IMAGING:  Imaging tests were completed and reviewed and discussed radiology and care team involved and reveals   XR CHEST PORTABLE    Result Date: 1/14/2023  EXAMINATION: ONE XRAY VIEW OF THE CHEST 1/14/2023 7:35 pm COMPARISON: 06/10/2022 HISTORY: ORDERING SYSTEM PROVIDED HISTORY: short of breath TECHNOLOGIST PROVIDED HISTORY: Reason for exam:->short of breath What reading provider will be dictating this exam?->CRC FINDINGS: The lungs are without acute focal process. There is no effusion or pneumothorax. Cardiomegaly. The osseous structures are without acute process. No acute process. XR CHEST 1 VIEW    Result Date: 1/17/2023  EXAMINATION: ONE XRAY VIEW OF THE CHEST 1/17/2023 11:52 am COMPARISON: 01/14/2023 chest radiograph and CTA chest. HISTORY: ORDERING SYSTEM PROVIDED HISTORY: hypoxia TECHNOLOGIST PROVIDED HISTORY: Reason for exam:->hypoxia What reading provider will be dictating this exam?->CRC FINDINGS: There are interstitial and patchy airspace densities throughout the lungs. There is probable blunting of right costophrenic sulcus. Cardiac silhouette is enlarged.   Prominent right paratracheal soft tissues is probably due to rotation of the current study. There is atherosclerotic calcification of the thoracic aorta. There is moderate thoracic spondylosis. There are multiple remote left lateral rib fractures. No pneumothorax. Bilateral lung infiltrates consistent with pulmonary edema and or pneumonia. Probable small right pleural effusion. Stable enlargement of the cardiac silhouette. CTA PULMONARY W CONTRAST    Result Date: 1/14/2023  EXAMINATION: CTA OF THE CHEST 1/14/2023 10:17 pm TECHNIQUE: CTA of the chest was performed after the administration of intravenous contrast.  Multiplanar reformatted images are provided for review. MIP images are provided for review. Automated exposure control, iterative reconstruction, and/or weight based adjustment of the mA/kV was utilized to reduce the radiation dose to as low as reasonably achievable. COMPARISON: None. HISTORY: ORDERING SYSTEM PROVIDED HISTORY: r/o pe TECHNOLOGIST PROVIDED HISTORY: Reason for exam:->r/o pe Decision Support Exception - unselect if not a suspected or confirmed emergency medical condition->Emergency Medical Condition (MA) What reading provider will be dictating this exam?->CRC FINDINGS: Pulmonary Arteries: Pulmonary arteries are adequately opacified for evaluation. No evidence of intraluminal filling defect to suggest pulmonary embolism. Main pulmonary artery is normal in caliber. Mediastinum: No evidence of mediastinal lymphadenopathy. The heart and pericardium demonstrate no acute abnormality. There is no acute abnormality of the thoracic aorta. Lungs/pleura: The lungs are without acute process. There is mild bilateral peribronchial thickening. No focal consolidation. There is mild interlobular septal thickening. No evidence of pneumothorax. Moderate bilateral pleural effusions. Upper Abdomen: Limited images of the upper abdomen are unremarkable. Soft Tissues/Bones: No acute bone or soft tissue abnormality.      No evidence of pulmonary embolism. Mild interlobular septal thickening, which could suggest slight pulmonary edema. Moderate bilateral pleural effusions. Clinical and laboratory correlation recommended. CT chest January 19, 2022   Limited study. Diffuse bilateral patchy and ground-glass infiltrates and pleural effusions   likely CHF/edema and or diffuse pneumonia. Cardiomegaly and coronary artery calcification. HISTORY OF PRESENT ILLNESS : [facility] 80 y.o. male was seen in consultation regarding the above chief compliant. Patient presented to the emergency room regarding shortness of breath. He did have a positive COVID test.  Unfortunately he is a poor historian. At time of my examination he is quite drowsy and is not oriented. He has apparently been quite agitated intermittently. Unfortunately I am unsure of his baseline mentation. Assessment:     Acute hypoxemic respiratory failure, continues to require supplemental oxygen  COVID-19 positive  Concern for aspiration pneumonia, piperacillin tazobactam.    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 CT Head reviewed- no acute abnormality  CT Chest-diffuse bilateral patchy and groundglass infiltrates and pleural effusions likely viral pna vs pulm edema. Procal reassuring at 0.08  Currently on room air.   Continue Decadron  CRP 4.9--now decreased to 2.0 less than 7.5 Tocilizumab not indicated,   Continue breathing treatments ordered    Delirium precautions  Recommend avoiding all sedating medications including pain medication and Haldol  Appreciate cardiology recommendations  Aspiration precautions, HOB elevated at least 30 degrees  Case discussed with Dr. Puja Yuen, plan for discharge planning in the next few days. Patient is currently on room air. Pulmonary will sign off at this time. Please call with any additional pulmonary needs.       Zach Quinn, DO

## 2023-01-22 NOTE — PLAN OF CARE
Problem: Discharge Planning  Goal: Discharge to home or other facility with appropriate resources  Outcome: Progressing     Problem: Skin/Tissue Integrity  Goal: Absence of new skin breakdown  Description: 1. Monitor for areas of redness and/or skin breakdown  2. Assess vascular access sites hourly  3. Every 4-6 hours minimum:  Change oxygen saturation probe site  4. Every 4-6 hours:  If on nasal continuous positive airway pressure, respiratory therapy assess nares and determine need for appliance change or resting period. Outcome: Progressing     Problem: ABCDS Injury Assessment  Goal: Absence of physical injury  Outcome: Progressing     Problem: Pain  Goal: Verbalizes/displays adequate comfort level or baseline comfort level  Outcome: Progressing     Problem: Safety - Adult  Goal: Free from fall injury  Outcome: Progressing     Problem: Safety - Medical Restraint  Goal: Remains free of injury from restraints (Restraint for Interference with Medical Device)  Description: INTERVENTIONS:  1. Determine that other, less restrictive measures have been tried or would not be effective before applying the restraint  2. Evaluate the patient's condition at the time of restraint application  3. Inform patient/family regarding the reason for restraint  4.  Q2H: Monitor safety, psychosocial status, comfort, nutrition and hydration  Outcome: Progressing     Problem: Nutrition Deficit:  Goal: Optimize nutritional status  Outcome: Progressing

## 2023-01-22 NOTE — PLAN OF CARE
Problem: Discharge Planning  Goal: Discharge to home or other facility with appropriate resources  1/22/2023 1804 by Bassem Scales RN  Outcome: Progressing  1/22/2023 0509 by Femi Banuelos RN  Outcome: Progressing     Problem: Skin/Tissue Integrity  Goal: Absence of new skin breakdown  Description: 1. Monitor for areas of redness and/or skin breakdown  2. Assess vascular access sites hourly  3. Every 4-6 hours minimum:  Change oxygen saturation probe site  4. Every 4-6 hours:  If on nasal continuous positive airway pressure, respiratory therapy assess nares and determine need for appliance change or resting period. 1/22/2023 1804 by Bassem Scales RN  Outcome: Progressing  1/22/2023 0509 by Femi Banuelos RN  Outcome: Progressing     Problem: ABCDS Injury Assessment  Goal: Absence of physical injury  1/22/2023 1804 by Bassem Scales RN  Outcome: Progressing  1/22/2023 0509 by Femi Banuelos RN  Outcome: Progressing     Problem: Pain  Goal: Verbalizes/displays adequate comfort level or baseline comfort level  1/22/2023 1804 by Bassem Scales RN  Outcome: Progressing  1/22/2023 0509 by Femi Banuelos RN  Outcome: Progressing     Problem: Safety - Adult  Goal: Free from fall injury  1/22/2023 1804 by Bassem Scales RN  Outcome: Progressing  1/22/2023 0509 by Femi Banuelos RN  Outcome: Progressing     Problem: Safety - Medical Restraint  Goal: Remains free of injury from restraints (Restraint for Interference with Medical Device)  Description: INTERVENTIONS:  1. Determine that other, less restrictive measures have been tried or would not be effective before applying the restraint  2. Evaluate the patient's condition at the time of restraint application  3. Inform patient/family regarding the reason for restraint  4.  Q2H: Monitor safety, psychosocial status, comfort, nutrition and hydration  1/22/2023 1804 by Bassem Scales RN  Outcome: Progressing  1/22/2023 0509 by Femi Banuelos RN  Outcome: Progressing Problem: Nutrition Deficit:  Goal: Optimize nutritional status  1/22/2023 1804 by Evy Peterson RN  Outcome: Progressing  1/22/2023 0509 by Rina Chaves RN  Outcome: Progressing

## 2023-01-23 PROCEDURE — 6370000000 HC RX 637 (ALT 250 FOR IP): Performed by: INTERNAL MEDICINE

## 2023-01-23 PROCEDURE — 2580000003 HC RX 258: Performed by: INTERNAL MEDICINE

## 2023-01-23 PROCEDURE — 97535 SELF CARE MNGMENT TRAINING: CPT

## 2023-01-23 PROCEDURE — 6360000002 HC RX W HCPCS: Performed by: INTERNAL MEDICINE

## 2023-01-23 PROCEDURE — 97530 THERAPEUTIC ACTIVITIES: CPT

## 2023-01-23 PROCEDURE — 2140000000 HC CCU INTERMEDIATE R&B

## 2023-01-23 RX ORDER — IPRATROPIUM BROMIDE AND ALBUTEROL SULFATE 2.5; .5 MG/3ML; MG/3ML
1 SOLUTION RESPIRATORY (INHALATION) EVERY 4 HOURS PRN
Status: DISCONTINUED | OUTPATIENT
Start: 2023-01-23 | End: 2023-01-23

## 2023-01-23 RX ORDER — IPRATROPIUM BROMIDE AND ALBUTEROL SULFATE 2.5; .5 MG/3ML; MG/3ML
1 SOLUTION RESPIRATORY (INHALATION)
Status: DISCONTINUED | OUTPATIENT
Start: 2023-01-23 | End: 2023-01-24 | Stop reason: HOSPADM

## 2023-01-23 RX ADMIN — PIPERACILLIN AND TAZOBACTAM 4500 MG: 4; .5 INJECTION, POWDER, LYOPHILIZED, FOR SOLUTION INTRAVENOUS at 23:11

## 2023-01-23 RX ADMIN — PIPERACILLIN AND TAZOBACTAM 4500 MG: 4; .5 INJECTION, POWDER, LYOPHILIZED, FOR SOLUTION INTRAVENOUS at 08:58

## 2023-01-23 RX ADMIN — FINASTERIDE 5 MG: 5 TABLET, FILM COATED ORAL at 08:40

## 2023-01-23 RX ADMIN — Medication 1 CAPSULE: at 08:39

## 2023-01-23 RX ADMIN — ASPIRIN 81 MG: 81 TABLET, COATED ORAL at 08:40

## 2023-01-23 RX ADMIN — DEXAMETHASONE SODIUM PHOSPHATE 6 MG: 4 INJECTION, SOLUTION INTRAMUSCULAR; INTRAVENOUS at 21:32

## 2023-01-23 RX ADMIN — TAMSULOSIN HYDROCHLORIDE 0.4 MG: 0.4 CAPSULE ORAL at 21:32

## 2023-01-23 RX ADMIN — ATORVASTATIN CALCIUM 40 MG: 40 TABLET, FILM COATED ORAL at 21:32

## 2023-01-23 RX ADMIN — PIPERACILLIN AND TAZOBACTAM 4500 MG: 4; .5 INJECTION, POWDER, LYOPHILIZED, FOR SOLUTION INTRAVENOUS at 15:28

## 2023-01-23 RX ADMIN — MELATONIN 3 MG ORAL TABLET 3 MG: 3 TABLET ORAL at 21:32

## 2023-01-23 RX ADMIN — METOPROLOL SUCCINATE 25 MG: 25 TABLET, EXTENDED RELEASE ORAL at 08:39

## 2023-01-23 RX ADMIN — POTASSIUM CHLORIDE 20 MEQ: 20 TABLET, EXTENDED RELEASE ORAL at 08:40

## 2023-01-23 RX ADMIN — ENOXAPARIN SODIUM 40 MG: 100 INJECTION SUBCUTANEOUS at 08:39

## 2023-01-23 NOTE — PROGRESS NOTES
Occupational Therapy  OT BEDSIDE TREATMENT NOTE    Yoana Soluto [address] [address]      Date:2023  Patient Name: Jacinta Marina  MRN: [id number]  : 3/20/1930  Room: [address] [place] [address]     Per OT eval  Evaluating OT: Aster Garrison OTR/L #[id number]      Referring Provider: Benny Harris MD  Specific Provider Orders/Date: OT eval and treat 23     Diagnosis: Acute respiratory failure with hypoxia ([facility] [J96.01]  Acute on chronic combined systolic and diastolic CHF (congestive heart failure) ([address] [I50.43]  COVID-19 [U07.1]   Pt admitted to hospital with SOB      Pertinent Medical History:  has a past medical history of Asthma, BPH (benign prostatic hypertrophy), CAD (coronary artery disease), COPD (chronic obstructive pulmonary disease) ([facility] [address] Glaucoma, Hyperlipidemia, Hypertension, Preoperative clearance, Retention of urine, and Valvular heart disease.          Precautions:  Fall Risk,  Droplet plus (COVID-19), , bed alarm, O2     Assessment of current deficits    [x] Functional mobility          [x]ADLs           [x] Strength                  []Cognition    [x] Functional transfers        [x] IADLs         [x] Safety Awareness   [x]Endurance    [] Fine Coordination                        [x] Balance      [] Vision/perception   []Sensation      []Gross Motor Coordination            [] ROM           [] Delirium                   [x] Motor Control      OT PLAN OF CARE   OT POC based on physician orders, patient diagnosis and results of clinical assessment     Frequency/Duration 1-3 days/wk for 2 weeks PRN   Specific OT Treatment Interventions to include:   * Instruction/training on adapted ADL techniques and AE recommendations to increase functional independence within precautions       * Training on energy conservation strategies, correct breathing pattern and techniques to improve independence/tolerance for self-care routine  * Functional transfer/mobility training/DME recommendations for increased independence, safety, and fall prevention  * Patient/Family education to increase follow through with safety techniques and functional independence  * Recommendation of environmental modifications for increased safety with functional transfers/mobility and ADLs  * Cognitive retraining/development of therapeutic activities to improve problem solving, judgement, memory, and attention for increased safety/participation in ADL/IADL tasks  * Therapeutic exercise to improve motor endurance, ROM, and functional strength for ADLs/functional transfers  * Therapeutic activities to facilitate/challenge dynamic balance, stand tolerance for increased safety and independence with ADLs  * Therapeutic activities to facilitate gross/fine motor skills for increased independence with ADLs  * Neuro-muscular re-education: facilitation of righting/equilibrium reactions, midline orientation, scapular stability/mobility, normalization of muscle tone, and facilitation of volitional active controled movement        Recommended Adaptive Equipment: TBD      Home Living: Pt lives alone in a 2 story home with with 2 AWA and 1 hand rail. Bed/ bath on 2nd floor   Bathroom setup: tub/shower combo    Equipment owned: none     Prior Level of Function: Independent with ADLs , Independent  with IADLs; ambulated without AD   Driving: no   Occupation: na     Pain Level: Pt c/o 3/10 pain \"all over\"     Cognition: A&O: x2 (self and place);  Follows 1 step directions             Memory:  F-             Sequencing:  F-             Problem solving:  P+             Judgement/safety:  P+               Functional Assessment:  AM-PAC Daily Activity Raw Score: 11/24    Initial Eval Status  Date: 1/16/23 Treatment Status  Date:1/23/23 STGs = LTGs  Time frame: 10-14 days   Feeding Independent  Max A  Pt demo's difficulty grasping cup, instruction on using BUE, increased assist due to overall weakness and FMC deficits     Grooming Minimal Assist      Standing  Max A  Sitting EOB to wash face and hands, [place] guidance required for task initiation  Modified [place]    UB Dressing Stand by Assist  Max A  To dof/don gown sitting EOB, max vc for sequencing  Modified [place]    LB Dressing Moderate Assist  Dependent  To don/dof socks, pt declined to attempt despite encouragement/education Modified [place]    Bathing Moderate Assist Max A  Simulated sitting EOB  Modified [place]    Toileting Moderate Assist  Max A Modified [place]    Bed Mobility  Supine to sit: NT  Sit to supine: Minimal Assist  Supine to sit: Max A  Sit to supine: Mod Assist    Max vc/tc for sequencing and attention to task Supine to sit: Modified [place]   Sit to supine: Modified [place]    Functional Transfers Minimal Assist  Mod A   For STS from EOB Modified [place]    Functional Mobility Minimal Assist        Ambulated in room without AD; mild unsteadiness Mod A  Taking approx 3-4 side steps to [facility] without AD  Modified [place]    Balance Sitting:     Static:  SBA    Dynamic: SBA  Standing: Mod A Sitting:     Static:  SBA    Dynamic: min A  Standing: Mod A      Activity Tolerance P+ P  Pt lethargic throughout session  F+   Visual/  Perceptual Glasses: yes  wfl                          Hand Dominance right    Strength ROM Additional Info:    RUE   4-/5 wfl good  and wfl FMC/dexterity noted during ADL tasks      LUE 4-/5 wfl good  and wfl FMC/dexterity noted during ADL tasks      Hearing: wfl  Sensation:wfl  Tone: wfl  Edema:none noted       Comments: Upon arrival pt supine in bed. ADL retraining to increase safety and indep in dressing and grooming tasks, balance and trf training to increase participation in functional mobility and standing aspects of ADLs with increased safety.  Pt educated on techniques to increase independence and safety during ADLs, bed mobility, and functional transfers. Pt would benefit from continued skilled OT to increase safety and independence with completion of ADL/IADL tasks for functional independence and quality of life. At end of session pt returned to supine, call light within reach, bed alarm on, sitter present. Pt has made slow progress towards set goals.      Continue with current plan of care    Treatment Time In:1137            Treatment Time Out: 1200             Treatment Charges: Mins Units   Ther Ex  13539     Manual Therapy 52720     Thera Activities 94267 8 1   ADL/Home Mgt 33068 15 1   Neuro Re-ed 25129     Group Therapy      Orthotic manage/training  20569     Non-Billable Time     Total Timed Treatment 23 [address] [place]/L [address]

## 2023-01-23 NOTE — PROGRESS NOTES
Physical Therapy    Treatment Note    Name: Nicole Galvan  : 3/20/1930  MRN: [id number]      Date of Service: 2023    Evaluating PT: Gabriela Rosas, PT, DPT [id number]      Room #:  6966/8842-X  Diagnosis:  Acute respiratory failure with hypoxia ([facility] [J96.01]  Acute on chronic combined systolic and diastolic CHF (congestive heart failure) ([facility] [I50.43]  COVID-19 [U07.1]  PMHx/PSHx:   has a past medical history of Asthma, BPH (benign prostatic hypertrophy), CAD (coronary artery disease), COPD (chronic obstructive pulmonary disease) ([facility] Glaucoma, Hyperlipidemia, Hypertension, Preoperative clearance, Retention of urine, and Valvular heart disease. Precautions:  Fall Risk, Droplet Plus Isolation (COVID-19), O2, , Alarm    SUBJECTIVE:    Pt lives alone in a 2 story house with 2 stair(s) and 1 rail(s) to enter. Full flight of stairs and 1 rail to second floor. Pt ambulated without AD prior to admission. OBJECTIVE:   Initial Evaluation  Date: 23 Treatment Date: 23 Short Term/ Long Term   Goals   AM-PAC 6 Clicks 46/52     Was pt agreeable to Eval/treatment? Yes Yes    Does pt have pain? No current complaints of pain No current complaints of pain    Bed Mobility  Rolling: NT  Supine to sit: NT  Sit to supine: Min A  Scooting: SBA to HOB Rolling: NT  Supine to sit: Max A  Sit to supine: Max A  Scooting: Max A to EOB Rolling: Independent   Supine to sit:  Independent   Sit to supine: Independent   Scooting: Independent    Transfers Sit to stand: Min A  Stand to sit: Min A  Stand pivot: Min A without AD Sit to stand: NT due to lethargy  Stand to sit: NT  Stand pivot: NT Sit to stand: Supervision  Stand to sit: Supervision  Stand pivot: Supervision without AD   Ambulation   40 feet without AD with Min A NT >200 feet without AD with Supervision   Stair negotiation: ascended and descended NT NT >4 step(s) with 1 rail(s) with SBA   ROM BUE: Refer to OT note  BLE: WFL NT    Strength BUE: Refer to OT note  BLE: WFL NT    Balance Sitting EOB: Supervision  Dynamic Standing: SBA without AD Sitting EOB: Min A  Dynamic Standing: NT Sitting EOB: Independent   Dynamic Standing: Independent      Pt is A & O x: 1 to person. Pt was very confused and lethargic. Sensation: Pt denies numbness and tingling of extremities. Edema: Unremarkable. Patient education  Pt educated on PT role in acute care setting. Patient response to education:   Pt verbalized understanding Pt demonstrated skill Pt requires further education in this area   No NA Yes     ASSESSMENT:    Comments:    Pt was in bed upon room entry; agreeable to PT treatment. Pt was very lethargic and confused. Pt opened his eyes but required maximum cues/stimulation. Pt only kept eyes open briefly. Pt required heavy assist for transfer to EOB. Sitting balance was Fair considering his lack of alertness. Pt sat at EOB for about 5 minutes. Did not attempt to stand due to lethargy. Pt was assisted back to bed and positioned comfortably. Pt was left in bed with all needs met at conclusion of session. Treatment:  Patient practiced and was instructed in the following treatment:    Therapeutic activities:  Bed mobility: Pt was cued for technique during bed mobility transfers. Sitting EOB: Pt sat at EOB for 5 minutes (sitting balance). Vitals and symptoms were closely monitored throughout session. Skillful positioning in bed to protect skin/joint integrity. PLAN:    Patient is making slow progress towards established goals. Will continue with current POC.       Time in: 1215  Time out: 1225    Total Treatment Time 10 minutes     CPT codes:  [] Gait training 43469 0 minutes  [] Manual therapy 17852 0 minutes  [x] Therapeutic activities 26269 10 minutes  [] Therapeutic exercises 06145 0 minutes  [] Neuromuscular reeducation 88348 0 minutes      Rebeka Meeks, PT, DPT   [id number]

## 2023-01-23 NOTE — PROGRESS NOTES
Alf Easley MD FACP  Internal medicine  Progress Notes      CHIEF COMPLAINT: Shortness of breath      HISTORY OF PRESENT ILLNESS:    1/15/2023  Patient was seen on the floor earlier this morning at the main campus of [facility]  He was in isolation room due to [place] test being positive  He was a poor historian  Registered nurse at bedside  60-year-old  man being evaluated for TAVR at [facility] for severe aortic stenosis  He claims that for the last 1 month he has not felt well with worsening shortness of breath  He does not know if he had fever or chills  Tested positive for COVID  Was in CHF  Responded very well to 1 dose of IV Lasix  Admitted for further management  1/16/2023  Patient was seen on the floor earlier this morning  Overnight combativeness reported  He was pulling IVs out and wanted to go home  This morning he is fully oriented and wants to go home  Not requiring any supplemental oxygen  No dyspnea or acute distress  1/17/2023  Patient was seen on the floor earlier this morning  Not doing well  Discharge was canceled yesterday  Currently in restraints due to combativeness and pulling on lines  On supplemental oxygen  1/18/2023  Patient was seen on the floor earlier this morning  He was in isolation room due to COVID-positive test  Requiring restraints due to agitation  On supplemental oxygen  Chest x-ray showing infiltrates  1/19/2023  Patient was seen on the floor earlier this morning  Spoke with pulmonary  Remains agitated  1/20/2023  Patient was seen on the floor earlier this morning  Overnight he was combative and agitated  Overall he is doing poorly  1/21/2023  Patient was seen on the floor earlier this morning  Remains in isolation room due to COVID status  On supplemental oxygen through nasal cannula  He looks much more comfortable without any distress  Mental status at baseline with some agitation  1/22/2023  Patient was seen on the floor earlier this morning  Able to sleep flat on bed  On supplemental oxygen 6 L through nasal cannula  1/23/2023  Patient was seen on the floor earlier this morning  He remains in isolation room  Confused but not combative today  Not wearing any oxygen this morning  Past Medical History:    Past Medical History:   Diagnosis Date    Asthma     MILD, no issues    BPH (benign prostatic hypertrophy)     CAD (coronary artery disease)     follows with Dr Judith Malik. COPD (chronic obstructive pulmonary disease) ([facility] [address]     Glaucoma     patient unsure    Hyperlipidemia     Hypertension     Preoperative clearance 10/10/2017    cardiac, Dr Judith Malik. Retention of urine     Valvular heart disease     MV insufficiency, tricuspid regurgitation         Past Surgical History:    Past Surgical History:   Procedure Laterality Date    CATARACT REMOVAL  11/10 & 1/11    LEFT & RIGHT    COLONOSCOPY  11/7/2011    DIVERTICULOSIS    CORONARY ANGIOPLASTY  10/02/02    2 STENTS OF OM2 WITH GOOD RESULTS. DIAGNOSTIC CARDIAC CATH LAB PROCEDURE  10/02/02    DONE SECONDARY TO RECURRENT CP & MILD ISCHEMIA OF ANTEROLATERAL WALL ON NUCLEAR, SHOWD 30-40% 2ND DIAGONAL STENOSIS.  20-30% MID LAD STENSOIS. 90% MID OM2 STENOSIS. PATENT RCA. NORMAL LV FUNCTION W/EF MORE THAN 55%. EYE SURGERY      HIP ARTHROPLASTY Right 11/07/2017    SKIN BIOPSY      TONSILLECTOMY      TUMOR EXCISION  4 YRS AGO    RIGHT EAR       Medications Prior to Admission:    Medications Prior to Admission: Rosuvastatin Calcium 20 MG CPSP, Take by mouth  acetaminophen (TYLENOL) 325 MG tablet, Take 2 tablets by mouth every 6 hours as needed for Pain  Multiple Vitamins-Minerals (OCUVITE) TABS oral tablet, Take 1 tablet by mouth daily  tamsulosin (FLOMAX) 0.4 MG capsule, Take 0.4 mg by mouth daily. Multiple Vitamins-Minerals (CENTRUM SILVER ADULT 50+) TABS, Take 1 tablet by mouth daily. finasteride (PROSCAR) 5 MG tablet, Take 5 mg by mouth daily.     [DISCONTINUED] metoprolol (TOPROL-XL) 25 MG XL tablet, Take 12.5 mg by mouth nightly    Allergies:    Patient has no known allergies. Social History:    reports that he has quit smoking. His smoking use included cigarettes. He has a 30.00 pack-year smoking history. He has never used smokeless tobacco. He reports current alcohol use. He reports that he does not use drugs. Family History:   family history includes Cancer in his brother and sister; Hypertension in his sister. REVIEW OF SYSTEMS:  As above in the HPI, otherwise negative    PHYSICAL EXAM:    Vitals:  /89   Pulse 75   Temp 97.7 °F (36.5 °C) (Temporal)   Resp 18   Ht 5' 4\" (1.626 m)   Wt 145 lb (65.8 kg)   SpO2 100%   BMI 24.89 kg/m²     General:  Awake, alert,  Somewhat confused  And combative  HEENT:  Normocephalic, atraumatic. Pupils equal, round, reactive to light. No scleral icterus. No conjunctival injection. No nasal discharge. Oral mucosa is dry neck:  Supple  Heart:  RRR, no murmurs, gallops, rubs  Lungs: Diffuse wheezes noted/much improved  Abdomen:   Bowel sounds present, soft, nontender, no masses, no organomegaly, no peritoneal signs  Extremities:  No clubbing, cyanosis, or edema  Skin:  Warm and dry, no open lesions or rash  Neuro:  Cranial nerves 2-12 intact, no focal deficits  Generalized muscle atrophy noted  Breast: deferred  Rectal: deferred  Genitalia:  deferred    LABS:  Data reviewed      ASSESSMENT:    Overall he is better today  Suspected aspiration  Principal Problem:    Acute respiratory failure with hypoxia ([address]  COVID-positive test  Acute CHF/well compensated now  Severe aortic stenosis  Remote coronary artery disease      PLAN:  Unsafe to diuresis aggressively in view of  critical aortic stenosis  I spoke with the nephew who is a physician from out of town  He was questioning whether this is pulmonary edema or aspiration pneumonia 1/21/23  Initial CAT scan showing pattern of viral pneumonitis  Repeat chest x-ray suggestive of pulmonary edema with high BNP  I ordered a dose of Bumex IV 1/21/23  BNP and chest x-ray both show improvement 1/22/23  Spoke with cardiology 1/22  Continue antibiotics for the time being  Resume other home medications  Questions answered to Chapis Guajardo MD  12:01 PM  1/23/2023

## 2023-01-23 NOTE — RT PROTOCOL NOTE
RT Nebulizer Bronchodilator Protocol Note    There is a bronchodilator order in the chart from a provider indicating to follow the RT Bronchodilator Protocol and there is an Initiate RT Bronchodilator Protocol order as well (see protocol at bottom of note). CXR Findings:  No results found. The findings from the last RT Protocol Assessment were as follows:  Smoking: Smoker 15 pack years or more  Respiratory Pattern: Regular pattern and RR 12-20 bpm  Breath Sounds: Slightly diminished and/or crackles  Cough: Strong, spontaneous, non-productive  Indication for Bronchodilator Therapy:    Bronchodilator Assessment Score: 3    Aerosolized bronchodilator medication orders have been revised according to the RT Nebulizer Bronchodilator Protocol below. Respiratory Therapist to perform RT Therapy Protocol Assessment initially then follow the protocol. Repeat RT Therapy Protocol Assessment PRN for score 0-3 or on second treatment, BID, and PRN for scores above 3. No Indications - adjust the frequency to every 6 hours PRN wheezing or bronchospasm, if no treatments needed after 48 hours then discontinue using Per Protocol order mode. If indication present, adjust the RT bronchodilator orders based on the Bronchodilator Assessment Score as indicated below. If a patient is on this medication at home then do not decrease Frequency below that used at home. 0-3 - enter or revise RT bronchodilator order(s) to equivalent RT Bronchodilator order with Frequency of every 4 hours PRN for wheezing or increased work of breathing using Per Protocol order mode. 4-6 - enter or revise RT Bronchodilator order(s) to two equivalent RT bronchodilator orders with one order with BID Frequency and one order with Frequency of every 4 hours PRN wheezing or increased work of breathing using Per Protocol order mode.          7-10 - enter or revise RT Bronchodilator order(s) to two equivalent RT bronchodilator orders with one order with TID Frequency and one order with Frequency of every 4 hours PRN wheezing or increased work of breathing using Per Protocol order mode. 11-13 - enter or revise RT Bronchodilator order(s) to one equivalent RT bronchodilator order with QID Frequency and an Albuterol order with Frequency of every 4 hours PRN wheezing or increased work of breathing using Per Protocol order mode. Greater than 13 - enter or revise RT Bronchodilator order(s) to one equivalent RT bronchodilator order with every 4 hours Frequency and an Albuterol order with Frequency of every 2 hours PRN wheezing or increased work of breathing using Per Protocol order mode.        Electronically signed by Natalie Anthony on 1/23/2023 at 10:00 AM

## 2023-01-23 NOTE — CARE COORDINATION
Received a call from Manuel Qiu at the [facility] and she states the insurance company is requesting updated PT/OT notes. Call made to the therapy department for stat PT/OT, no answer; left message regarding the call. Per nursing rounds, patient currently weaned off of oxygen and is on room air. Spoke with patient's nephew, Makenna Elliott and provided update. He is pleased to hear patient is off the oxygen and is awaiting PT/OT updates. If patient does well with physical therapy and is cleared for discharge, he states he will take the patient home; wants to get patient to his appointment with [facility] on 1/25.    1:16P  Updated therapy notes faxed to Manuel Qiu at the [facility]. [facility] Amb[facility] form and [facility] completed and envelope placed on the soft chart. Ambulance transport placed on will-call for [facility].        Lawanda Jernigan, JOHN, LSW [phone]

## 2023-01-24 VITALS
TEMPERATURE: 98.1 F | RESPIRATION RATE: 26 BRPM | DIASTOLIC BLOOD PRESSURE: 72 MMHG | OXYGEN SATURATION: 100 % | HEIGHT: 64 IN | SYSTOLIC BLOOD PRESSURE: 103 MMHG | HEART RATE: 72 BPM | BODY MASS INDEX: 24.75 KG/M2 | WEIGHT: 145 LBS

## 2023-01-24 PROCEDURE — 6370000000 HC RX 637 (ALT 250 FOR IP): Performed by: INTERNAL MEDICINE

## 2023-01-24 PROCEDURE — 2580000003 HC RX 258: Performed by: INTERNAL MEDICINE

## 2023-01-24 PROCEDURE — 6360000002 HC RX W HCPCS: Performed by: INTERNAL MEDICINE

## 2023-01-24 RX ADMIN — POTASSIUM CHLORIDE 20 MEQ: 20 TABLET, EXTENDED RELEASE ORAL at 08:55

## 2023-01-24 RX ADMIN — PIPERACILLIN AND TAZOBACTAM 4500 MG: 4; .5 INJECTION, POWDER, LYOPHILIZED, FOR SOLUTION INTRAVENOUS at 06:46

## 2023-01-24 RX ADMIN — Medication 1 CAPSULE: at 08:55

## 2023-01-24 RX ADMIN — ASPIRIN 81 MG: 81 TABLET, COATED ORAL at 08:54

## 2023-01-24 RX ADMIN — METOPROLOL SUCCINATE 25 MG: 25 TABLET, EXTENDED RELEASE ORAL at 08:55

## 2023-01-24 RX ADMIN — ENOXAPARIN SODIUM 40 MG: 100 INJECTION SUBCUTANEOUS at 08:54

## 2023-01-24 RX ADMIN — FINASTERIDE 5 MG: 5 TABLET, FILM COATED ORAL at 08:55

## 2023-01-24 ASSESSMENT — PAIN SCALES - GENERAL: PAINLEVEL_OUTOF10: 0

## 2023-01-24 NOTE — ADT AUTH CERT
Utilization Reviews       Respiratory Failure GRG - Care Day 8 (1/22/2023) by Omero Bell RN       Review Status Review Entered   Completed 1/24/2023 1006       Created By   Omero Bell RN      Criteria Review      Care Day: 8 Care Date: 1/22/2023 Level of Care: Intermediate Care    Guideline Day 3    Level Of Care    (X) * Activity level acceptable    Clinical Status    (X) * Ventilation status appropriate    ( ) * Airway status acceptable    ( ) * Respiratory status acceptable    1/24/2023 10:06 AM EST by Nia Dooley      CONTINUES WITH O2 REQUIRMENT RA AT BASELINE    ( ) * Stable chest findings    ( ) * Neurologic status acceptable    1/24/2023 10:06 AM EST by Nia Dooley      REMAINS CONFUSED WITH EPISODES OF COMBATIVENESS AT TIMES    (X) * Temperature status acceptable    (X) * No infection, or status acceptable    ( ) * General Discharge Criteria met    1/24/2023 10:06 AM EST by Nia Dooley      ONGOING    Interventions    (X) * No chest tube, or status acceptable    (X) * Intake acceptable    ( ) * No inpatient interventions needed    1/24/2023 10:06 AM EST by Dominic Mauricio ON IV ATB THERAPY AND IV MEDS CONTINUES WITH O2 REQUIREMENT       Definitions for Care Day 8    Airway status acceptable    (X) Airway status acceptable, as indicated by  1 or more  of the following  (1) (2) (3) (4):       (X) Patient extubated, breathing adequately, and able to protect airway (eg, able to handle secretions)       * Milestone   Additional Notes   DATE:    Cd 8          RELEVANT BASELINES: (lab values, vitals, o2 amount/delivery, etc.)         RA AT BASELINE       PERTINENT UPDATES:      CONTINUES ON IV ATB THERAPY   CONTINUES ON IV MEDICATIONS    CONTINUES WITH DROPLET ISOLATION    CONTINUES WITH 02 REQUIREMENT    CONTINUES WITH WHEEZING    REMAINS CONFUSED WITH EPISODES OF COMBATIVENESS AT TIMES       VITALS:      97.2 18 52 135/95 93% 8L O2 NC       ABNL/PERTINENT LABS/RADIOLOGY/DIAGNOSTIC STUDIES: Pro-BNP 16,906       PHYSICAL EXAM:      Lungs: Diffuse wheezes noted      MD CONSULTS/ASSESSMENT AND PLAN:         IM NOTE       ASSESSMENT:     Overall he is better today   Suspected aspiration   Principal Problem:     Acute respiratory failure with hypoxia (HCC)   COVID-positive test   Acute CHF/well compensated now   Severe aortic stenosis   Remote coronary artery disease           PLAN:   Unsafe to diuresis aggressively in view of  critical aortic stenosis   I spoke with the nephew who is a physician from out of town   He was questioning whether this is pulmonary edema or aspiration pneumonia 1/21/23   Initial CAT scan showing pattern of viral pneumonitis   Repeat chest x-ray suggestive of pulmonary edema with high BNP   I ordered a dose of Bumex IV 1/21/23   BNP and chest x-ray both show improvement this morning   Spoke with cardiology this morning   Continue antibiotics for the time being   Resume other home medications   Questions answered to satisfaction               PULM NOTE       Assessment:        Acute hypoxemic respiratory failure, continues to require supplemental oxygen   COVID-19 positive   Concern for aspiration pneumonia, piperacillin tazobactam.     Delirium with altered mental status, more alert today continues to remain confused and combative at times   HFrEF 40-45%, Elevated proBNP, 14,442>> 25,860   Severe aortic stenosis, plan is for TAVR in [place]   Suspect sleep apnea, may need cpap while inpatient   Risk for aspiration   Poor oral intake d/t mental status, improving               Plan:        Continue to increase oral intake. CT Head reviewed- no acute abnormality   CT Chest-diffuse bilateral patchy and groundglass infiltrates and pleural effusions likely viral pna vs pulm edema. Procal reassuring at 0.08   Currently on room air.    Continue Decadron   CRP 4.9--now decreased to 2.0 less than 7.5 Tocilizumab not indicated,    Continue breathing treatments ordered Delirium precautions   Recommend avoiding all sedating medications including pain medication and Haldol   Appreciate cardiology recommendations   Aspiration precautions, HOB elevated at least 30 degrees         MEDICATIONS:      dexamethasone (DECADRON) injection 6 mg   Dose: 6 mg   Freq: EVERY 24 HOURS Route: IV         piperacillin-tazobactam (ZOSYN) 4,500 mg in dextrose 5 % 100 mL IVPB ([id number])   Dose: 4,500 mg   Freq: EVERY 8 HOURS Route: IV               ORDERS:      DROPLET ISOLATION LABS ASPIRATION PRECAUTIONS ADULT REG DIET O2 PROTOCOL                   Respiratory Failure GRG - Care Day 6 (1/20/2023) by Nasim Prince RN       Review Status Review Entered   Completed 1/23/2023 1417       Created By   Nasim Prince RN      Criteria Review      Care Day: 6 Care Date: 1/20/2023 Level of Care: Intermediate Care    Guideline Day 3    Level Of Care    ( ) * Activity level acceptable    1/23/2023 2:17 PM EST by Leyla Vera      Neuro: Patient is agitated to verbal and tactile stimuli. He does not follow any commands. He is noted to spontaneously move all extremities in the bed  -on Restraints non-violent or non-self-destructive    Clinical Status    (X) * Ventilation status appropriate    1/23/2023 2:17 PM EST by Leyla Vera      now on room air    (X) * Airway status acceptable    ( ) * Respiratory status acceptable    1/23/2023 1:20 PM EST by Leyla Vera      Lungs: Diffuse wheezes noted    ( ) * Stable chest findings    1/23/2023 2:17 PM EST by Leyla Vera      PULMONARY:  Few crackles in bases. ( ) * Neurologic status acceptable    1/23/2023 1:20 PM EST by Leyla Vera      General:  Awake, alert, oriented X 3.   Somewhat confused  And combative    (X) * Temperature status acceptable    1/23/2023 2:17 PM EST by Farrukh Boo 96.4    (X) * No infection, or status acceptable    1/23/2023 2:17 PM EST by Leyla Vera      WBC 7.2 E9/L    ( ) * General Discharge Criteria met    Interventions    (X) * No chest tube, or status acceptable    (X) * Intake acceptable    1/23/2023 2:17 PM EST by Ermelinda Herrera DIET;  Regular; Low Fat/Low Chol/High Fiber/2 gm Na    ( ) * No inpatient interventions needed    1/23/2023 2:17 PM EST by José Miguel Millard      Still on IVfluids and IV antibiotics       Definitions for Care Day 6    Ventilation status appropriate    (X) Ventilation status appropriate, as indicated by  1 or more  of the following  (1) (2) (3):       (X) Weaning and other medical care at next level of care appropriate (4) (5)       Intake acceptable    (X) Intake acceptable, as indicated by  1 or more  of the following  (1):       (X) Oral hydration, medications, and diet       * Milestone   Additional Notes   DATE: 1/20, Day 6         RELEVANT BASELINES: (lab values, vitals, o2 amount/delivery, etc.)   Does not wear O2 at baseline      PERTINENT UPDATES:   - pt is receiving IV Zosyn antibiotic   - pt remains on IV fluids   - still requires O2 supplementation to maintain saturation more than 90%      VITALS:   T 96.4 CA 94 RR 21 /84 SpO2 93% on 9.5L /min non-rebreather mask      ABNL/PERTINENT LABS/RADIOLOGY/DIAGNOSTIC STUDIES:   1/20/23 08:31   Chloride: 97 (L)   BUN,BUNPL: 31 (H)   Anion Gap: 22 (H)   Glucose, Random: 347 (H)   CALCIUM, SERUM, 089602: 8.2 (L)   Total Protein: 6.2 (L)   Albumin: 3.2 (L)   Alk Phos: 67   AST: 43 (H)   Total Protein: 6.2 (L)   Beta-Hydroxybutyrate: 0.94 (H)   Glucose, Random: 347 (H)   RDW: 16.7 (H)   Neutrophils %: 86.8 (H)   Lymphocyte %: 5.4 (L)   Lymphocytes Absolute: 0.39 (L)   Eosinophils Absolute: 0.00 (L)      PHYSICAL EXAM:   Lungs: Diminished, few basilar rales   Cardiac: PMI nondisplaced, regular rhythm with a normal rate, frequent ectopy, normal S1 & S2, systolic murmur       MD CONSULTS/ASSESSMENT AND PLAN:   IM Note:   ASSESSMENT:     Overall he is doing poorly   Appears dehydrated clinically    bronchospastic this morning   Suspected aspiration   Principal Problem:     Acute respiratory failure with hypoxia (HCC)   COVID-positive test   Acute CHF/well compensated now   Severe aortic stenosis   Remote coronary artery disease   PLAN:   Palliative care consult   IV hydration    Zosyn     DuoNeb   Continue steroids   Resume other home medications      Pulmonology Note:   Assessment: 1. Acute hypoxemic respiratory failure   2. COVID-19 positive   3. Delirium   4. Elevated proBNP, HFrEF   5. Severe aortic stenosis   6. Risk for aspiration   7. Protein calorie malnutrition   8. Poor oral intake   Plan: 1. Wean FiO2 as tolerated   2. Continue Decadron   3. CRP less than 7.5. Tocilizumab not indicated. 4.Breathing treatments ordered however patient is not currently able to cooperate with these   5. Delirium precautions   6. Recommend avoiding all sedating medications including pain medication and Haldol   7. Agree with continuing Zosyn for possible aspiration. 8.IV fluids started as per primary. Would also recommend small bore feeding tube for nutrition. 9.CT of the head reviewed which shows no acute process. CT of chest also reviewed which shows diffuse patchy groundglass infiltrates bilaterally. Also small pleural effusions bilaterally. 10.ABG reviewed   11. Continue aspiration precautions with head of bed elevated 30 degrees or more      Cardiology Note:   IMPRESSION:   1. Acute on chronic heart failure with reduced ejection fraction. proBNP 14,000   2. Minimally elevated hs-cTnT: 50-44-48 ng/L flat pattern likely chronic myocardial injury. No evidence of ACS   3. Frequent PVCs   4.Cardiomyopathy EF 40-45% by echo, 36% by SPECT. Mixed ischemic, valvular   5. Severe aortic stenosis with mild AR, mild to moderate MR, mild TR   6. Coronary artery disease PCI to OM 2002   7. RBBB   8.COVID-19 pneumonia   9. Acute hypoxic respiratory failure   10. Bilateral pleural effusions   11. Hypertension   12. COPD   15. Hyperlipidemia RECOMMENDATIONS:   Apparently patient has close follow-up with [facility] clinic and being considered for TAVR at the [facility] clinic. Apparently has appointment with the [facility] clinic on January 25, 2023   Follow-up with your cardiologist in the outpatient with [facility] for an ischemic and TAVR evaluation. Continue on cardiac medications as blood pressure allows    Avoid hypotension given severe aortic stenosis and discussed    Initiate low-dose Entresto if there is no contraindication and blood pressure can allow without hypotension       MEDICATIONS:   ASA 81mg PO x 1   Lipitor 40mg PO x 41   Decadron 6mg q24 IV x 1   Lovenox 40mg SC x 1   Toprol 25mg PO x 1   Zosyn 4.5g q8 IV @ 25 mL/hr over 240 mins x  3   D5 % and 0.45 % NaCl with KCl 20 mEq cont IV infusion @ 75mL/hr   Tylenol 650mg q6 PO x 1   Haldol 2mg q6 IM x 1         ORDERS:   Restraints non-violent or non-self-destructive    Inpatient consult to Palliative Care    DNR comfort care - arrest    Non-Heated High Flow Nasal Cannula       PT/OT/SLP/CM ASSESSMENT OR NOTES:   CM Note:   Plan is for patient to d/c to the [facility] when medically cleared. Spoke to patient's nephew/POA and states he has not spoken to a doctor at all regarding his uncle since admission and is requesting a call. He states he will be taking his uncle out of the hospital on Tuesday if he has not been discharged; for his appt 1/25 at the [facility]. Perfect serve message sent to attending. Ambulance form completed, [facility] completed and envelope placed on the soft chart. Ambulance transport placed on will-call for [facility].

## 2023-01-24 NOTE — PROGRESS NOTES
Bed alarm went off on patient bed, went to patient bedside. Patient was trying to climb out of bed. Patient had his legs hanging over rail trying to get his feet on ground, and stated \"I'm ready to go. \" Assisted patient to reposition in bed, provided education about calling for assistance, and not ambulating without staff. Bed alarm back on. Patient denies any pain or discomfort.

## 2023-01-24 NOTE — PLAN OF CARE
Problem: Discharge Planning  Goal: Discharge to home or other facility with appropriate resources  Outcome: Progressing  Flowsheets (Taken 1/23/2023 2143)  Discharge to home or other facility with appropriate resources: Identify barriers to discharge with patient and caregiver     Problem: Skin/Tissue Integrity  Goal: Absence of new skin breakdown  Description: 1. Monitor for areas of redness and/or skin breakdown  2. Assess vascular access sites hourly  3. Every 4-6 hours minimum:  Change oxygen saturation probe site  4. Every 4-6 hours:  If on nasal continuous positive airway pressure, respiratory therapy assess nares and determine need for appliance change or resting period.   Outcome: Progressing     Problem: ABCDS Injury Assessment  Goal: Absence of physical injury  Outcome: Progressing     Problem: Pain  Goal: Verbalizes/displays adequate comfort level or baseline comfort level  Outcome: Progressing  Flowsheets  Taken 1/24/2023 0530  Verbalizes/displays adequate comfort level or baseline comfort level: Encourage patient to monitor pain and request assistance  Taken 1/24/2023 0000  Verbalizes/displays adequate comfort level or baseline comfort level: Encourage patient to monitor pain and request assistance  Taken 1/23/2023 1951  Verbalizes/displays adequate comfort level or baseline comfort level: Encourage patient to monitor pain and request assistance     Problem: Safety - Adult  Goal: Free from fall injury  Outcome: Progressing     Problem: Nutrition Deficit:  Goal: Optimize nutritional status  Outcome: Progressing

## 2023-01-24 NOTE — CARE COORDINATION
Discharge order noted. Patient to discharge home and nephew, Marjorie Muñiz will transport home. No home going needs reported, Marjorie Muñiz states he will be caring for the patient and has family support.     Charlee Chung, MSW, LSW [phone]

## 2023-07-06 ENCOUNTER — HOSPITAL ENCOUNTER (EMERGENCY)
Age: 88
Discharge: HOME | End: 2023-07-06
Attending: EMERGENCY MEDICINE
Payer: MEDICARE

## 2023-07-06 VITALS
HEIGHT: 64 IN | BODY MASS INDEX: 24.75 KG/M2 | OXYGEN SATURATION: 100 % | RESPIRATION RATE: 24 BRPM | DIASTOLIC BLOOD PRESSURE: 83 MMHG | TEMPERATURE: 97.2 F | SYSTOLIC BLOOD PRESSURE: 98 MMHG | HEART RATE: 150 BPM | WEIGHT: 145 LBS

## 2023-07-06 DIAGNOSIS — I95.9 HYPOTENSION, UNSPECIFIED HYPOTENSION TYPE: Primary | ICD-10-CM

## 2023-07-06 DIAGNOSIS — N17.9 AKI (ACUTE KIDNEY INJURY) (HCC): ICD-10-CM

## 2023-07-06 DIAGNOSIS — R06.02 SHORTNESS OF BREATH: ICD-10-CM

## 2023-07-06 LAB
ALBUMIN SERPL-MCNC: 3.7 G/DL (ref 3.5–5.2)
ALP SERPL-CCNC: 123 U/L (ref 40–129)
ALT SERPL-CCNC: 67 U/L (ref 0–40)
ANION GAP SERPL CALCULATED.3IONS-SCNC: 16 MMOL/L (ref 7–16)
AST SERPL-CCNC: 89 U/L (ref 0–39)
BACTERIA URNS QL MICRO: ABNORMAL /HPF
BASOPHILS # BLD: 0.02 E9/L (ref 0–0.2)
BASOPHILS NFR BLD: 0.3 % (ref 0–2)
BILIRUB SERPL-MCNC: 1.6 MG/DL (ref 0–1.2)
BILIRUB UR QL STRIP: NEGATIVE
BUN SERPL-MCNC: 51 MG/DL (ref 6–23)
CALCIUM SERPL-MCNC: 8.9 MG/DL (ref 8.6–10.2)
CHLORIDE SERPL-SCNC: 107 MMOL/L (ref 98–107)
CLARITY UR: CLEAR
CO2 SERPL-SCNC: 19 MMOL/L (ref 22–29)
COLOR UR: YELLOW
CREAT SERPL-MCNC: 1.5 MG/DL (ref 0.7–1.2)
EOSINOPHIL # BLD: 0.03 E9/L (ref 0.05–0.5)
EOSINOPHIL NFR BLD: 0.4 % (ref 0–6)
ERYTHROCYTE [DISTWIDTH] IN BLOOD BY AUTOMATED COUNT: 15.3 FL (ref 11.5–15)
GLUCOSE SERPL-MCNC: 87 MG/DL (ref 74–99)
GLUCOSE UR STRIP-MCNC: NEGATIVE MG/DL
HCT VFR BLD AUTO: 44.1 % (ref 37–54)
HGB BLD-MCNC: 14.3 G/DL (ref 12.5–16.5)
HGB UR QL STRIP: ABNORMAL
IMM GRANULOCYTES # BLD: 0.02 E9/L
IMM GRANULOCYTES NFR BLD: 0.3 % (ref 0–5)
KETONES UR STRIP-MCNC: NEGATIVE MG/DL
LEUKOCYTE ESTERASE UR QL STRIP: NEGATIVE
LYMPHOCYTES # BLD: 1.03 E9/L (ref 1.5–4)
LYMPHOCYTES NFR BLD: 13.5 % (ref 20–42)
MCH RBC QN AUTO: 31.9 PG (ref 26–35)
MCHC RBC AUTO-ENTMCNC: 32.4 % (ref 32–34.5)
MCV RBC AUTO: 98.4 FL (ref 80–99.9)
MONOCYTES # BLD: 0.81 E9/L (ref 0.1–0.95)
MONOCYTES NFR BLD: 10.6 % (ref 2–12)
NEUTROPHILS # BLD: 5.71 E9/L (ref 1.8–7.3)
NEUTS SEG NFR BLD: 74.9 % (ref 43–80)
NITRITE UR QL STRIP: NEGATIVE
PH UR STRIP: 5.5 [PH] (ref 5–9)
PLATELET # BLD AUTO: 141 E9/L (ref 130–450)
PMV BLD AUTO: 11.6 FL (ref 7–12)
POTASSIUM SERPL-SCNC: 4.5 MMOL/L (ref 3.5–5)
PROT SERPL-MCNC: 5.9 G/DL (ref 6.4–8.3)
PROT UR STRIP-MCNC: 30 MG/DL
RBC # BLD AUTO: 4.48 E12/L (ref 3.8–5.8)
RBC #/AREA URNS HPF: ABNORMAL /HPF (ref 0–2)
SODIUM SERPL-SCNC: 142 MMOL/L (ref 132–146)
SP GR UR STRIP: 1.02 (ref 1–1.03)
UROBILINOGEN UR STRIP-ACNC: 1 E.U./DL
WBC # BLD: 7.6 E9/L (ref 4.5–11.5)
WBC #/AREA URNS HPF: ABNORMAL /HPF (ref 0–5)

## 2023-07-06 PROCEDURE — 6360000002 HC RX W HCPCS: Performed by: STUDENT IN AN ORGANIZED HEALTH CARE EDUCATION/TRAINING PROGRAM

## 2023-07-06 PROCEDURE — 80053 COMPREHEN METABOLIC PANEL: CPT

## 2023-07-06 PROCEDURE — 96376 TX/PRO/DX INJ SAME DRUG ADON: CPT

## 2023-07-06 PROCEDURE — 85025 COMPLETE CBC W/AUTO DIFF WBC: CPT

## 2023-07-06 PROCEDURE — 81001 URINALYSIS AUTO W/SCOPE: CPT

## 2023-07-06 PROCEDURE — 99284 EMERGENCY DEPT VISIT MOD MDM: CPT

## 2023-07-06 PROCEDURE — 87088 URINE BACTERIA CULTURE: CPT

## 2023-07-06 PROCEDURE — 96375 TX/PRO/DX INJ NEW DRUG ADDON: CPT

## 2023-07-06 PROCEDURE — 2500000003 HC RX 250 WO HCPCS: Performed by: STUDENT IN AN ORGANIZED HEALTH CARE EDUCATION/TRAINING PROGRAM

## 2023-07-06 PROCEDURE — 96365 THER/PROPH/DIAG IV INF INIT: CPT

## 2023-07-06 PROCEDURE — 6360000002 HC RX W HCPCS: Performed by: EMERGENCY MEDICINE

## 2023-07-06 PROCEDURE — 96361 HYDRATE IV INFUSION ADD-ON: CPT

## 2023-07-06 PROCEDURE — 93005 ELECTROCARDIOGRAM TRACING: CPT | Performed by: EMERGENCY MEDICINE

## 2023-07-06 PROCEDURE — 2580000003 HC RX 258: Performed by: EMERGENCY MEDICINE

## 2023-07-06 RX ORDER — LORAZEPAM 2 MG/ML
1 INJECTION INTRAMUSCULAR ONCE
Status: COMPLETED | OUTPATIENT
Start: 2023-07-06 | End: 2023-07-06

## 2023-07-06 RX ORDER — MIDODRINE HYDROCHLORIDE 2.5 MG/1
2.5 TABLET ORAL 3 TIMES DAILY
COMMUNITY

## 2023-07-06 RX ORDER — MORPHINE SULFATE 2 MG/ML
2 INJECTION, SOLUTION INTRAMUSCULAR; INTRAVENOUS ONCE
Status: COMPLETED | OUTPATIENT
Start: 2023-07-06 | End: 2023-07-06

## 2023-07-06 RX ORDER — ATORVASTATIN CALCIUM 10 MG/1
10 TABLET, FILM COATED ORAL NIGHTLY
COMMUNITY

## 2023-07-06 RX ORDER — GLYCOPYRROLATE 0.2 MG/ML
0.1 INJECTION INTRAMUSCULAR; INTRAVENOUS ONCE
Status: COMPLETED | OUTPATIENT
Start: 2023-07-06 | End: 2023-07-06

## 2023-07-06 RX ORDER — FUROSEMIDE 20 MG/1
20 TABLET ORAL DAILY
COMMUNITY

## 2023-07-06 RX ORDER — 0.9 % SODIUM CHLORIDE 0.9 %
1000 INTRAVENOUS SOLUTION INTRAVENOUS ONCE
Status: COMPLETED | OUTPATIENT
Start: 2023-07-06 | End: 2023-07-06

## 2023-07-06 RX ORDER — ROPINIROLE 0.5 MG/1
0.5 TABLET, FILM COATED ORAL NIGHTLY
COMMUNITY

## 2023-07-06 RX ADMIN — SODIUM CHLORIDE 1000 ML: 9 INJECTION, SOLUTION INTRAVENOUS at 12:46

## 2023-07-06 RX ADMIN — LORAZEPAM 1 MG: 2 INJECTION INTRAMUSCULAR; INTRAVENOUS at 16:37

## 2023-07-06 RX ADMIN — LORAZEPAM 1 MG: 2 INJECTION INTRAMUSCULAR; INTRAVENOUS at 10:52

## 2023-07-06 RX ADMIN — LORAZEPAM 1 MG: 2 INJECTION INTRAMUSCULAR; INTRAVENOUS at 18:50

## 2023-07-06 RX ADMIN — GLYCOPYRROLATE 0.1 MG: 0.2 INJECTION INTRAMUSCULAR; INTRAVENOUS at 18:50

## 2023-07-06 RX ADMIN — MORPHINE SULFATE 2 MG: 2 INJECTION, SOLUTION INTRAMUSCULAR; INTRAVENOUS at 18:50

## 2023-07-06 ASSESSMENT — PAIN - FUNCTIONAL ASSESSMENT: PAIN_FUNCTIONAL_ASSESSMENT: NONE - DENIES PAIN

## 2023-07-06 ASSESSMENT — PAIN SCALES - GENERAL: PAINLEVEL_OUTOF10: 10

## 2023-07-06 NOTE — CARE COORDINATION
Social Work/ Transition of Care:    Pt presents to the ED secondary to atrial fibrillation and shortness of breath from the [facility]. Pt is a DNRCC. Per ED physician, after discussion with Stacy Flynn, pt's nephew/POA, plan will be for hospice care. SW met with pt's nephew/POA, who confirms plan for hospice/comfort care. SW provided freedom of choice and pt's nephew would like [facility]. JEFFERY made referral to Hasty with [facility] who reports Woomarcell Velázquez from [facility] will be in ED to meet with pt and nephew. Once consent forms are signed and oxygen ordered and delivered to facility, pt will discharge from ED to return to the [facility]. RN will need to set up transport and call report to the [facility] [phone]. ED physician aware.

## 2023-07-07 LAB
EKG ATRIAL RATE: 133 BPM
EKG Q-T INTERVAL: 370 MS
EKG QRS DURATION: 162 MS
EKG QTC CALCULATION (BAZETT): 552 MS
EKG R AXIS: -61 DEGREES
EKG T AXIS: -22 DEGREES
EKG VENTRICULAR RATE: 134 BPM

## 2023-07-07 PROCEDURE — 93010 ELECTROCARDIOGRAM REPORT: CPT | Performed by: INTERNAL MEDICINE

## 2023-07-07 NOTE — ED NOTES
RN called Neel Villasen[place] [facility] confirmed [place] has been delivered for patient.  RN calls [facility] to set up transport, [facility] ETA is [phone] Lance Cordova RN  07/06/23 2100

## 2023-07-08 LAB — BACTERIA UR CULT: NORMAL
